# Patient Record
Sex: MALE | Race: WHITE | NOT HISPANIC OR LATINO | Employment: OTHER | URBAN - METROPOLITAN AREA
[De-identification: names, ages, dates, MRNs, and addresses within clinical notes are randomized per-mention and may not be internally consistent; named-entity substitution may affect disease eponyms.]

---

## 2018-02-20 ENCOUNTER — TRANSCRIBE ORDERS (OUTPATIENT)
Dept: ADMINISTRATIVE | Facility: HOSPITAL | Age: 68
End: 2018-02-20

## 2018-02-20 DIAGNOSIS — Z87.891 PERSONAL HISTORY OF TOBACCO USE, PRESENTING HAZARDS TO HEALTH: Primary | ICD-10-CM

## 2018-02-23 ENCOUNTER — HOSPITAL ENCOUNTER (OUTPATIENT)
Dept: ULTRASOUND IMAGING | Facility: HOSPITAL | Age: 68
Discharge: HOME | End: 2018-02-23
Attending: INTERNAL MEDICINE | Admitting: INTERNAL MEDICINE

## 2018-02-23 DIAGNOSIS — Z87.891 PERSONAL HISTORY OF TOBACCO USE, PRESENTING HAZARDS TO HEALTH: ICD-10-CM

## 2018-02-23 PROCEDURE — 76706 US ABDL AORTA SCREEN AAA: CPT

## 2018-05-11 ENCOUNTER — LAB REQUISITION (OUTPATIENT)
Dept: LAB | Facility: HOSPITAL | Age: 68
End: 2018-05-11

## 2018-05-11 DIAGNOSIS — Z00.00 ROUTINE GENERAL MEDICAL EXAMINATION AT A HEALTH CARE FACILITY: ICD-10-CM

## 2018-05-11 LAB
ALBUMIN SERPL-MCNC: 4.2 G/DL (ref 3.2–4.8)
ALBUMIN/GLOB SERPL: 1.6 G/DL (ref 1.5–2.5)
ALP SERPL-CCNC: 43 U/L (ref 25–100)
ALT SERPL W P-5'-P-CCNC: 26 U/L (ref 7–40)
ANION GAP SERPL CALCULATED.3IONS-SCNC: 8 MMOL/L (ref 3–11)
AST SERPL-CCNC: 28 U/L (ref 0–33)
BASOPHILS # BLD AUTO: 0 10*3/MM3 (ref 0–0.2)
BASOPHILS NFR BLD AUTO: 0 % (ref 0–1)
BILIRUB SERPL-MCNC: 0.4 MG/DL (ref 0.3–1.2)
BUN BLD-MCNC: 28 MG/DL (ref 9–23)
BUN/CREAT SERPL: 28 (ref 7–25)
CALCIUM SPEC-SCNC: 8.3 MG/DL (ref 8.7–10.4)
CHLORIDE SERPL-SCNC: 104 MMOL/L (ref 99–109)
CO2 SERPL-SCNC: 26 MMOL/L (ref 20–31)
CREAT BLD-MCNC: 1 MG/DL (ref 0.6–1.3)
DEPRECATED RDW RBC AUTO: 46.5 FL (ref 37–54)
EOSINOPHIL # BLD AUTO: 0.02 10*3/MM3 (ref 0–0.3)
EOSINOPHIL NFR BLD AUTO: 0.1 % (ref 0–3)
ERYTHROCYTE [DISTWIDTH] IN BLOOD BY AUTOMATED COUNT: 14.1 % (ref 11.3–14.5)
GFR SERPL CREATININE-BSD FRML MDRD: 74 ML/MIN/1.73
GLOBULIN UR ELPH-MCNC: 2.7 GM/DL
GLUCOSE BLD-MCNC: 130 MG/DL (ref 70–100)
HCT VFR BLD AUTO: 46.1 % (ref 38.9–50.9)
HGB BLD-MCNC: 14.9 G/DL (ref 13.1–17.5)
IMM GRANULOCYTES # BLD: 0.04 10*3/MM3 (ref 0–0.03)
IMM GRANULOCYTES NFR BLD: 0.3 % (ref 0–0.6)
LYMPHOCYTES # BLD AUTO: 0.99 10*3/MM3 (ref 0.6–4.8)
LYMPHOCYTES NFR BLD AUTO: 6.6 % (ref 24–44)
MCH RBC QN AUTO: 29.1 PG (ref 27–31)
MCHC RBC AUTO-ENTMCNC: 32.3 G/DL (ref 32–36)
MCV RBC AUTO: 90 FL (ref 80–99)
MONOCYTES # BLD AUTO: 0.35 10*3/MM3 (ref 0–1)
MONOCYTES NFR BLD AUTO: 2.3 % (ref 0–12)
NEUTROPHILS # BLD AUTO: 13.59 10*3/MM3 (ref 1.5–8.3)
NEUTROPHILS NFR BLD AUTO: 90.7 % (ref 41–71)
PLATELET # BLD AUTO: 215 10*3/MM3 (ref 150–450)
PMV BLD AUTO: 10.2 FL (ref 6–12)
POTASSIUM BLD-SCNC: 3.9 MMOL/L (ref 3.5–5.5)
PROT SERPL-MCNC: 6.9 G/DL (ref 5.7–8.2)
RBC # BLD AUTO: 5.12 10*6/MM3 (ref 4.2–5.76)
SODIUM BLD-SCNC: 138 MMOL/L (ref 132–146)
WBC NRBC COR # BLD: 14.99 10*3/MM3 (ref 3.5–10.8)

## 2018-05-11 PROCEDURE — 85025 COMPLETE CBC W/AUTO DIFF WBC: CPT | Performed by: INTERNAL MEDICINE

## 2018-05-11 PROCEDURE — 80053 COMPREHEN METABOLIC PANEL: CPT | Performed by: INTERNAL MEDICINE

## 2018-05-12 ENCOUNTER — APPOINTMENT (OUTPATIENT)
Dept: CT IMAGING | Facility: HOSPITAL | Age: 68
End: 2018-05-12

## 2018-05-12 ENCOUNTER — APPOINTMENT (OUTPATIENT)
Dept: GENERAL RADIOLOGY | Facility: HOSPITAL | Age: 68
End: 2018-05-12

## 2018-05-12 ENCOUNTER — HOSPITAL ENCOUNTER (OUTPATIENT)
Facility: HOSPITAL | Age: 68
Setting detail: OBSERVATION
Discharge: HOME | End: 2018-05-13
Attending: EMERGENCY MEDICINE | Admitting: INTERNAL MEDICINE

## 2018-05-12 DIAGNOSIS — R50.9 FEVER IN ADULT: Primary | ICD-10-CM

## 2018-05-12 DIAGNOSIS — E86.0 DEHYDRATION: ICD-10-CM

## 2018-05-12 DIAGNOSIS — R19.7 NAUSEA VOMITING AND DIARRHEA: ICD-10-CM

## 2018-05-12 DIAGNOSIS — R11.2 NAUSEA VOMITING AND DIARRHEA: ICD-10-CM

## 2018-05-12 PROBLEM — E11.9 TYPE 2 DIABETES MELLITUS (HCC): Status: ACTIVE | Noted: 2018-05-12

## 2018-05-12 PROBLEM — I10 ESSENTIAL HYPERTENSION: Status: ACTIVE | Noted: 2018-05-12

## 2018-05-12 PROBLEM — E87.6 HYPOKALEMIA: Status: ACTIVE | Noted: 2018-05-12

## 2018-05-12 PROBLEM — R65.10 SIRS (SYSTEMIC INFLAMMATORY RESPONSE SYNDROME) (HCC): Status: ACTIVE | Noted: 2018-05-12

## 2018-05-12 LAB
ALBUMIN SERPL-MCNC: 3.8 G/DL (ref 3.2–4.8)
ALBUMIN/GLOB SERPL: 1.4 G/DL (ref 1.5–2.5)
ALP SERPL-CCNC: 34 U/L (ref 25–100)
ALT SERPL W P-5'-P-CCNC: 32 U/L (ref 7–40)
ANION GAP SERPL CALCULATED.3IONS-SCNC: 8 MMOL/L (ref 3–11)
AST SERPL-CCNC: 41 U/L (ref 0–33)
BASOPHILS # BLD AUTO: 0.01 10*3/MM3 (ref 0–0.2)
BASOPHILS NFR BLD AUTO: 0.1 % (ref 0–1)
BILIRUB SERPL-MCNC: 0.5 MG/DL (ref 0.3–1.2)
BILIRUB UR QL STRIP: NEGATIVE
BUN BLD-MCNC: 26 MG/DL (ref 9–23)
BUN/CREAT SERPL: 26 (ref 7–25)
CALCIUM SPEC-SCNC: 8 MG/DL (ref 8.7–10.4)
CHLORIDE SERPL-SCNC: 104 MMOL/L (ref 99–109)
CLARITY UR: CLEAR
CO2 SERPL-SCNC: 22 MMOL/L (ref 20–31)
COLOR UR: YELLOW
CREAT BLD-MCNC: 1 MG/DL (ref 0.6–1.3)
D-LACTATE SERPL-SCNC: 0.7 MMOL/L (ref 0.5–2)
DEPRECATED RDW RBC AUTO: 47 FL (ref 37–54)
EOSINOPHIL # BLD AUTO: 0.02 10*3/MM3 (ref 0–0.3)
EOSINOPHIL NFR BLD AUTO: 0.2 % (ref 0–3)
ERYTHROCYTE [DISTWIDTH] IN BLOOD BY AUTOMATED COUNT: 14.3 % (ref 11.3–14.5)
FLUAV AG NPH QL: NEGATIVE
FLUBV AG NPH QL IA: NEGATIVE
GFR SERPL CREATININE-BSD FRML MDRD: 74 ML/MIN/1.73
GLOBULIN UR ELPH-MCNC: 2.7 GM/DL
GLUCOSE BLD-MCNC: 138 MG/DL (ref 70–100)
GLUCOSE UR STRIP-MCNC: ABNORMAL MG/DL
HCT VFR BLD AUTO: 42.5 % (ref 38.9–50.9)
HGB BLD-MCNC: 13.8 G/DL (ref 13.1–17.5)
HGB UR QL STRIP.AUTO: NEGATIVE
HOLD SPECIMEN: NORMAL
HOLD SPECIMEN: NORMAL
IMM GRANULOCYTES # BLD: 0.02 10*3/MM3 (ref 0–0.03)
IMM GRANULOCYTES NFR BLD: 0.2 % (ref 0–0.6)
KETONES UR QL STRIP: ABNORMAL
LEUKOCYTE ESTERASE UR QL STRIP.AUTO: NEGATIVE
LIPASE SERPL-CCNC: 26 U/L (ref 6–51)
LYMPHOCYTES # BLD AUTO: 0.51 10*3/MM3 (ref 0.6–4.8)
LYMPHOCYTES NFR BLD AUTO: 5 % (ref 24–44)
MCH RBC QN AUTO: 29 PG (ref 27–31)
MCHC RBC AUTO-ENTMCNC: 32.5 G/DL (ref 32–36)
MCV RBC AUTO: 89.3 FL (ref 80–99)
MONOCYTES # BLD AUTO: 0.15 10*3/MM3 (ref 0–1)
MONOCYTES NFR BLD AUTO: 1.5 % (ref 0–12)
NEUTROPHILS # BLD AUTO: 9.53 10*3/MM3 (ref 1.5–8.3)
NEUTROPHILS NFR BLD AUTO: 93.2 % (ref 41–71)
NITRITE UR QL STRIP: NEGATIVE
PH UR STRIP.AUTO: <=5 [PH] (ref 5–8)
PLATELET # BLD AUTO: 186 10*3/MM3 (ref 150–450)
PMV BLD AUTO: 10 FL (ref 6–12)
POTASSIUM BLD-SCNC: 3.4 MMOL/L (ref 3.5–5.5)
PROCALCITONIN SERPL-MCNC: 0.88 NG/ML
PROT SERPL-MCNC: 6.5 G/DL (ref 5.7–8.2)
PROT UR QL STRIP: ABNORMAL
RBC # BLD AUTO: 4.76 10*6/MM3 (ref 4.2–5.76)
SODIUM BLD-SCNC: 134 MMOL/L (ref 132–146)
SP GR UR STRIP: 1.04 (ref 1–1.03)
TROPONIN I SERPL-MCNC: 0.03 NG/ML (ref 0–0.07)
UROBILINOGEN UR QL STRIP: ABNORMAL
WBC NRBC COR # BLD: 10.22 10*3/MM3 (ref 3.5–10.8)
WHOLE BLOOD HOLD SPECIMEN: NORMAL
WHOLE BLOOD HOLD SPECIMEN: NORMAL

## 2018-05-12 PROCEDURE — 85025 COMPLETE CBC W/AUTO DIFF WBC: CPT | Performed by: EMERGENCY MEDICINE

## 2018-05-12 PROCEDURE — 93005 ELECTROCARDIOGRAM TRACING: CPT | Performed by: EMERGENCY MEDICINE

## 2018-05-12 PROCEDURE — 83690 ASSAY OF LIPASE: CPT | Performed by: EMERGENCY MEDICINE

## 2018-05-12 PROCEDURE — 71045 X-RAY EXAM CHEST 1 VIEW: CPT

## 2018-05-12 PROCEDURE — 25010000002 PIPERACILLIN SOD-TAZOBACTAM PER 1 G: Performed by: NURSE PRACTITIONER

## 2018-05-12 PROCEDURE — G0378 HOSPITAL OBSERVATION PER HR: HCPCS

## 2018-05-12 PROCEDURE — 81003 URINALYSIS AUTO W/O SCOPE: CPT | Performed by: EMERGENCY MEDICINE

## 2018-05-12 PROCEDURE — 84145 PROCALCITONIN (PCT): CPT | Performed by: NURSE PRACTITIONER

## 2018-05-12 PROCEDURE — 80053 COMPREHEN METABOLIC PANEL: CPT | Performed by: EMERGENCY MEDICINE

## 2018-05-12 PROCEDURE — 99220 PR INITIAL OBSERVATION CARE/DAY 70 MINUTES: CPT | Performed by: INTERNAL MEDICINE

## 2018-05-12 PROCEDURE — 96367 TX/PROPH/DG ADDL SEQ IV INF: CPT

## 2018-05-12 PROCEDURE — 87040 BLOOD CULTURE FOR BACTERIA: CPT | Performed by: NURSE PRACTITIONER

## 2018-05-12 PROCEDURE — 83605 ASSAY OF LACTIC ACID: CPT | Performed by: NURSE PRACTITIONER

## 2018-05-12 PROCEDURE — 74177 CT ABD & PELVIS W/CONTRAST: CPT

## 2018-05-12 PROCEDURE — 96366 THER/PROPH/DIAG IV INF ADDON: CPT

## 2018-05-12 PROCEDURE — 96365 THER/PROPH/DIAG IV INF INIT: CPT

## 2018-05-12 PROCEDURE — 25010000002 IOPAMIDOL 61 % SOLUTION: Performed by: EMERGENCY MEDICINE

## 2018-05-12 PROCEDURE — 99285 EMERGENCY DEPT VISIT HI MDM: CPT

## 2018-05-12 PROCEDURE — 87804 INFLUENZA ASSAY W/OPTIC: CPT | Performed by: NURSE PRACTITIONER

## 2018-05-12 PROCEDURE — 84484 ASSAY OF TROPONIN QUANT: CPT

## 2018-05-12 RX ORDER — ONDANSETRON 4 MG/1
4 TABLET, FILM COATED ORAL EVERY 6 HOURS PRN
COMMUNITY
End: 2019-02-20

## 2018-05-12 RX ORDER — ACETAMINOPHEN 325 MG/1
650 TABLET ORAL ONCE
Status: COMPLETED | OUTPATIENT
Start: 2018-05-12 | End: 2018-05-12

## 2018-05-12 RX ORDER — AMLODIPINE BESYLATE 10 MG/1
10 TABLET ORAL DAILY
COMMUNITY
End: 2019-02-20 | Stop reason: SDUPTHER

## 2018-05-12 RX ORDER — SODIUM CHLORIDE 0.9 % (FLUSH) 0.9 %
10 SYRINGE (ML) INJECTION AS NEEDED
Status: DISCONTINUED | OUTPATIENT
Start: 2018-05-12 | End: 2018-05-13 | Stop reason: HOSPADM

## 2018-05-12 RX ORDER — ROSUVASTATIN CALCIUM 20 MG/1
20 TABLET, COATED ORAL NIGHTLY
COMMUNITY
End: 2019-02-20 | Stop reason: SDUPTHER

## 2018-05-12 RX ORDER — ESCITALOPRAM OXALATE 10 MG/1
10 TABLET ORAL DAILY
COMMUNITY
End: 2019-02-20 | Stop reason: SDUPTHER

## 2018-05-12 RX ORDER — LISINOPRIL 20 MG/1
20 TABLET ORAL DAILY
COMMUNITY
End: 2019-04-08 | Stop reason: SDUPTHER

## 2018-05-12 RX ORDER — IBUPROFEN 800 MG/1
800 TABLET ORAL ONCE
Status: COMPLETED | OUTPATIENT
Start: 2018-05-12 | End: 2018-05-12

## 2018-05-12 RX ADMIN — ACETAMINOPHEN 650 MG: 325 TABLET, FILM COATED ORAL at 19:19

## 2018-05-12 RX ADMIN — IBUPROFEN 800 MG: 800 TABLET ORAL at 21:19

## 2018-05-12 RX ADMIN — SODIUM CHLORIDE 1000 ML: 9 INJECTION, SOLUTION INTRAVENOUS at 19:19

## 2018-05-12 RX ADMIN — SODIUM CHLORIDE 1000 ML: 9 INJECTION, SOLUTION INTRAVENOUS at 20:38

## 2018-05-12 RX ADMIN — IOPAMIDOL 85 ML: 612 INJECTION, SOLUTION INTRAVENOUS at 20:01

## 2018-05-12 RX ADMIN — TAZOBACTAM SODIUM AND PIPERACILLIN SODIUM 4.5 G: 500; 4 INJECTION, SOLUTION INTRAVENOUS at 23:14

## 2018-05-12 NOTE — ED PROVIDER NOTES
Subjective   NVD x 3 days.     Chills, fever, right and left low back pain.    No spinal pain. No ha. No neck pain. No cough or chest pain.        Illness   Severity:  Moderate  Onset quality:  Gradual  Duration:  3 hours  Timing:  Constant  Progression:  Unchanged  Chronicity:  New  Relieved by:  Nothing  Worsened by:  Nothing  Ineffective treatments:  None  Associated symptoms: diarrhea, fatigue, fever, myalgias, nausea and vomiting    Associated symptoms: no abdominal pain, no chest pain, no headaches, no loss of consciousness, no rash and no shortness of breath        Review of Systems   Constitutional: Positive for fatigue and fever. Negative for chills.   Respiratory: Negative for shortness of breath.    Cardiovascular: Negative for chest pain.   Gastrointestinal: Positive for diarrhea, nausea and vomiting. Negative for abdominal pain, anal bleeding, blood in stool and constipation.   Genitourinary: Negative for difficulty urinating, dysuria, flank pain, frequency, hematuria and urgency.   Musculoskeletal: Positive for myalgias.   Skin: Negative for rash.   Neurological: Negative for loss of consciousness and headaches.   All other systems reviewed and are negative.      Past Medical History:   Diagnosis Date   • Diabetes mellitus    • Hypertension        No Known Allergies    Past Surgical History:   Procedure Laterality Date   • KNEE SURGERY         History reviewed. No pertinent family history.    Social History     Social History   • Marital status:      Social History Main Topics   • Smoking status: Never Smoker   • Alcohol use Yes      Comment: VERY OCCASIONAL   • Drug use: No     Other Topics Concern   • Not on file           Objective   Physical Exam   Constitutional: He is oriented to person, place, and time. He appears well-developed and well-nourished.   HENT:   Head: Normocephalic and atraumatic.   Right Ear: External ear normal.   Left Ear: External ear normal.   Nose: Nose normal.    Mouth/Throat: Oropharynx is clear and moist.   Eyes: Conjunctivae and EOM are normal. Pupils are equal, round, and reactive to light.   Neck: Normal range of motion. Neck supple. No spinous process tenderness present. No no neck rigidity. No erythema and normal range of motion present. No Brudzinski's sign and no Kernig's sign noted.   Cardiovascular: Normal rate, regular rhythm, normal heart sounds and intact distal pulses.    Pulmonary/Chest: Effort normal and breath sounds normal.   Abdominal: Soft. Bowel sounds are normal.   Musculoskeletal: Normal range of motion.   Neurological: He is alert and oriented to person, place, and time.   Skin: Skin is warm and dry.   Psychiatric: He has a normal mood and affect. His behavior is normal. Judgment normal.       Procedures           ED Course  ED Course   Comment By Time   Pt appears non toxic in person. He is able to check his phone and walk to the BR without trouble.    There is no ha, neck pain, etc.    I spoke with Dr. Whipple. There is no clear benefit to admission at this point. BC ordered. Will give some motrin. If no vomiting in the ED and feeling better may be able to send home. Zeferino Herberth, APRN 05/12 2143   Pt still with fever of 103 despite motrin 800mg and tylenol 650mg. His procal is 0.88 which puts him at moderate risk for sepsis. Will order abx and admit.    I spoke with Dr. Whipple who will admit. Zeferino Kevin, APRN 05/12 2242                  [facility]      Final diagnoses:   Fever in adult   Nausea vomiting and diarrhea   Dehydration            Zeferino Kevin, NARCISO  05/12/18 2246

## 2018-05-13 VITALS
TEMPERATURE: 99.4 F | SYSTOLIC BLOOD PRESSURE: 103 MMHG | OXYGEN SATURATION: 95 % | BODY MASS INDEX: 30.51 KG/M2 | HEART RATE: 73 BPM | RESPIRATION RATE: 16 BRPM | DIASTOLIC BLOOD PRESSURE: 61 MMHG | WEIGHT: 206 LBS | HEIGHT: 69 IN

## 2018-05-13 PROBLEM — R65.10 SIRS (SYSTEMIC INFLAMMATORY RESPONSE SYNDROME): Status: RESOLVED | Noted: 2018-05-12 | Resolved: 2018-05-13

## 2018-05-13 PROBLEM — E87.6 HYPOKALEMIA: Status: RESOLVED | Noted: 2018-05-12 | Resolved: 2018-05-13

## 2018-05-13 LAB
ANION GAP SERPL CALCULATED.3IONS-SCNC: 3 MMOL/L (ref 3–11)
BASOPHILS # BLD AUTO: 0.01 10*3/MM3 (ref 0–0.2)
BASOPHILS NFR BLD AUTO: 0.1 % (ref 0–1)
BUN BLD-MCNC: 20 MG/DL (ref 9–23)
BUN/CREAT SERPL: 20 (ref 7–25)
C DIFF TOX GENS STL QL NAA+PROBE: NOT DETECTED
CALCIUM SPEC-SCNC: 6.9 MG/DL (ref 8.7–10.4)
CHLORIDE SERPL-SCNC: 110 MMOL/L (ref 99–109)
CO2 SERPL-SCNC: 27 MMOL/L (ref 20–31)
CREAT BLD-MCNC: 1 MG/DL (ref 0.6–1.3)
DEPRECATED RDW RBC AUTO: 48.5 FL (ref 37–54)
EOSINOPHIL # BLD AUTO: 0.06 10*3/MM3 (ref 0–0.3)
EOSINOPHIL NFR BLD AUTO: 0.7 % (ref 0–3)
ERYTHROCYTE [DISTWIDTH] IN BLOOD BY AUTOMATED COUNT: 14.7 % (ref 11.3–14.5)
GFR SERPL CREATININE-BSD FRML MDRD: 74 ML/MIN/1.73
GLUCOSE BLD-MCNC: 126 MG/DL (ref 70–100)
GLUCOSE BLDC GLUCOMTR-MCNC: 117 MG/DL (ref 70–130)
HBA1C MFR BLD: 7.4 % (ref 4.8–5.6)
HCT VFR BLD AUTO: 38.8 % (ref 38.9–50.9)
HGB BLD-MCNC: 12.3 G/DL (ref 13.1–17.5)
IMM GRANULOCYTES # BLD: 0.01 10*3/MM3 (ref 0–0.03)
IMM GRANULOCYTES NFR BLD: 0.1 % (ref 0–0.6)
LYMPHOCYTES # BLD AUTO: 1.23 10*3/MM3 (ref 0.6–4.8)
LYMPHOCYTES NFR BLD AUTO: 13.6 % (ref 24–44)
MAGNESIUM SERPL-MCNC: 2 MG/DL (ref 1.3–2.7)
MCH RBC QN AUTO: 28.7 PG (ref 27–31)
MCHC RBC AUTO-ENTMCNC: 31.7 G/DL (ref 32–36)
MCV RBC AUTO: 90.7 FL (ref 80–99)
MONOCYTES # BLD AUTO: 0.75 10*3/MM3 (ref 0–1)
MONOCYTES NFR BLD AUTO: 8.3 % (ref 0–12)
NEUTROPHILS # BLD AUTO: 6.98 10*3/MM3 (ref 1.5–8.3)
NEUTROPHILS NFR BLD AUTO: 77.3 % (ref 41–71)
PLATELET # BLD AUTO: 151 10*3/MM3 (ref 150–450)
PMV BLD AUTO: 9.7 FL (ref 6–12)
POTASSIUM BLD-SCNC: 3.5 MMOL/L (ref 3.5–5.5)
RBC # BLD AUTO: 4.28 10*6/MM3 (ref 4.2–5.76)
SODIUM BLD-SCNC: 140 MMOL/L (ref 132–146)
WBC NRBC COR # BLD: 9.03 10*3/MM3 (ref 3.5–10.8)
WBC STL QL MICRO: NORMAL

## 2018-05-13 PROCEDURE — G0378 HOSPITAL OBSERVATION PER HR: HCPCS

## 2018-05-13 PROCEDURE — 96372 THER/PROPH/DIAG INJ SC/IM: CPT

## 2018-05-13 PROCEDURE — 96367 TX/PROPH/DG ADDL SEQ IV INF: CPT

## 2018-05-13 PROCEDURE — 96366 THER/PROPH/DIAG IV INF ADDON: CPT

## 2018-05-13 PROCEDURE — 87046 STOOL CULTR AEROBIC BACT EA: CPT | Performed by: NURSE PRACTITIONER

## 2018-05-13 PROCEDURE — 25010000002 VANCOMYCIN 10 G RECONSTITUTED SOLUTION: Performed by: NURSE PRACTITIONER

## 2018-05-13 PROCEDURE — 25010000002 ENOXAPARIN PER 10 MG: Performed by: NURSE PRACTITIONER

## 2018-05-13 PROCEDURE — 25010000002 PIPERACILLIN SOD-TAZOBACTAM PER 1 G: Performed by: NURSE PRACTITIONER

## 2018-05-13 PROCEDURE — 85025 COMPLETE CBC W/AUTO DIFF WBC: CPT | Performed by: NURSE PRACTITIONER

## 2018-05-13 PROCEDURE — 82962 GLUCOSE BLOOD TEST: CPT

## 2018-05-13 PROCEDURE — 87177 OVA AND PARASITES SMEARS: CPT | Performed by: NURSE PRACTITIONER

## 2018-05-13 PROCEDURE — 87045 FECES CULTURE AEROBIC BACT: CPT | Performed by: NURSE PRACTITIONER

## 2018-05-13 PROCEDURE — 83735 ASSAY OF MAGNESIUM: CPT | Performed by: NURSE PRACTITIONER

## 2018-05-13 PROCEDURE — 87493 C DIFF AMPLIFIED PROBE: CPT | Performed by: NURSE PRACTITIONER

## 2018-05-13 PROCEDURE — 99217 PR OBSERVATION CARE DISCHARGE MANAGEMENT: CPT | Performed by: INTERNAL MEDICINE

## 2018-05-13 PROCEDURE — 87205 SMEAR GRAM STAIN: CPT | Performed by: NURSE PRACTITIONER

## 2018-05-13 PROCEDURE — 80048 BASIC METABOLIC PNL TOTAL CA: CPT | Performed by: NURSE PRACTITIONER

## 2018-05-13 PROCEDURE — 96361 HYDRATE IV INFUSION ADD-ON: CPT

## 2018-05-13 PROCEDURE — 83036 HEMOGLOBIN GLYCOSYLATED A1C: CPT | Performed by: NURSE PRACTITIONER

## 2018-05-13 PROCEDURE — 87209 SMEAR COMPLEX STAIN: CPT | Performed by: NURSE PRACTITIONER

## 2018-05-13 RX ORDER — SODIUM CHLORIDE 9 MG/ML
100 INJECTION, SOLUTION INTRAVENOUS CONTINUOUS
Status: DISCONTINUED | OUTPATIENT
Start: 2018-05-13 | End: 2018-05-13 | Stop reason: HOSPADM

## 2018-05-13 RX ORDER — POTASSIUM CHLORIDE 7.45 MG/ML
10 INJECTION INTRAVENOUS
Status: DISCONTINUED | OUTPATIENT
Start: 2018-05-13 | End: 2018-05-13 | Stop reason: HOSPADM

## 2018-05-13 RX ORDER — ONDANSETRON 4 MG/1
4 TABLET, FILM COATED ORAL EVERY 6 HOURS PRN
Status: DISCONTINUED | OUTPATIENT
Start: 2018-05-13 | End: 2018-05-13 | Stop reason: HOSPADM

## 2018-05-13 RX ORDER — LISINOPRIL 20 MG/1
20 TABLET ORAL DAILY
Status: DISCONTINUED | OUTPATIENT
Start: 2018-05-13 | End: 2018-05-13

## 2018-05-13 RX ORDER — POTASSIUM CHLORIDE 1.5 G/1.77G
40 POWDER, FOR SOLUTION ORAL AS NEEDED
Status: DISCONTINUED | OUTPATIENT
Start: 2018-05-13 | End: 2018-05-13 | Stop reason: HOSPADM

## 2018-05-13 RX ORDER — POTASSIUM CHLORIDE 750 MG/1
40 CAPSULE, EXTENDED RELEASE ORAL AS NEEDED
Status: DISCONTINUED | OUTPATIENT
Start: 2018-05-13 | End: 2018-05-13 | Stop reason: HOSPADM

## 2018-05-13 RX ORDER — AMLODIPINE BESYLATE 10 MG/1
10 TABLET ORAL DAILY
Status: DISCONTINUED | OUTPATIENT
Start: 2018-05-13 | End: 2018-05-13

## 2018-05-13 RX ORDER — ESCITALOPRAM OXALATE 10 MG/1
10 TABLET ORAL DAILY
Status: DISCONTINUED | OUTPATIENT
Start: 2018-05-13 | End: 2018-05-13 | Stop reason: HOSPADM

## 2018-05-13 RX ORDER — ACETAMINOPHEN 325 MG/1
650 TABLET ORAL EVERY 4 HOURS PRN
Status: DISCONTINUED | OUTPATIENT
Start: 2018-05-13 | End: 2018-05-13 | Stop reason: HOSPADM

## 2018-05-13 RX ORDER — ROSUVASTATIN CALCIUM 20 MG/1
20 TABLET, COATED ORAL NIGHTLY
Status: DISCONTINUED | OUTPATIENT
Start: 2018-05-13 | End: 2018-05-13 | Stop reason: HOSPADM

## 2018-05-13 RX ORDER — ONDANSETRON 2 MG/ML
4 INJECTION INTRAMUSCULAR; INTRAVENOUS EVERY 6 HOURS PRN
Status: DISCONTINUED | OUTPATIENT
Start: 2018-05-13 | End: 2018-05-13 | Stop reason: HOSPADM

## 2018-05-13 RX ADMIN — POTASSIUM CHLORIDE 40 MEQ: 750 CAPSULE, EXTENDED RELEASE ORAL at 08:27

## 2018-05-13 RX ADMIN — ESCITALOPRAM OXALATE 10 MG: 10 TABLET ORAL at 08:27

## 2018-05-13 RX ADMIN — ENOXAPARIN SODIUM 30 MG: 30 INJECTION SUBCUTANEOUS at 08:27

## 2018-05-13 RX ADMIN — SODIUM CHLORIDE 100 ML/HR: 9 INJECTION, SOLUTION INTRAVENOUS at 02:06

## 2018-05-13 RX ADMIN — TAZOBACTAM SODIUM AND PIPERACILLIN SODIUM 3.38 G: 375; 3 INJECTION, SOLUTION INTRAVENOUS at 05:09

## 2018-05-13 RX ADMIN — ROSUVASTATIN CALCIUM 20 MG: 20 TABLET ORAL at 02:06

## 2018-05-13 RX ADMIN — VANCOMYCIN HYDROCHLORIDE 1750 MG: 10 INJECTION, POWDER, LYOPHILIZED, FOR SOLUTION INTRAVENOUS at 00:03

## 2018-05-13 NOTE — H&P
[facility] Medicine Services  HISTORY AND PHYSICAL    Patient Name: Kvng Li  : 1950  MRN: [id number]  Primary Care Physician: Zeferino Honeycutt MD    Subjective   Subjective     Chief Complaint:  Fever, nausea, vomiting, diarrhea    HPI:  Kvng Li is a 67 y.o. male with a past medical history significant for T2DM, hyperlipidemia and essential hypertension presents to the ED with complaints of nausea, vomiting, diarrhea and fever.  Patient states that on Thursday he had gone out to eat with his wife and about three hours after eating he had acute onset of nausea with vomiting.  In addition he has had intermittent chills and diarrhea.  Patient was seen by his PCP yesterday and was given prescription for zofran in which he states has helped some.  He was advised by his PCP if his N/V continued to go to the ED.  Patient states his N/V and diarrhea has continued along with lower back pain.   Upon arrival to the ED his temperature was 103.1.  He denies any chest pain, shortness of air, dysuria, hematuria, injury, erythema, cough or sore throat.  Currently he states his diarrhea has stopped and he no longer has any abdominal pain. He is eating crackers and drinking. Patient will be admitted to [facility] under the care of the Hospitalist for further evaluation and treatment.     Review of Systems   Constitutional: Positive for appetite change, chills and fever. Negative for fatigue.   HENT: Negative.    Eyes: Negative.    Respiratory: Negative for cough and shortness of breath.    Cardiovascular: Negative for chest pain, palpitations and leg swelling.   Gastrointestinal: Positive for abdominal pain, diarrhea, nausea and vomiting. Negative for abdominal distention.   Endocrine: Negative.    Genitourinary: Negative for difficulty urinating, dysuria, flank pain, frequency, hematuria and urgency.   Musculoskeletal: Positive for back pain. Negative for neck pain and neck stiffness.    Skin: Negative.    Neurological: Negative.    Psychiatric/Behavioral: Negative.         Otherwise 10-system ROS reviewed and is negative except as mentioned in the HPI.    Personal History     Past Medical History:   Diagnosis Date   • Diabetes mellitus    • Hypertension        Past Surgical History:   Procedure Laterality Date   • KNEE SURGERY         Family History: family history is not on file.     Social History:  reports that he has never smoked. He has never used smokeless tobacco. He reports that he drinks alcohol. He reports that he does not use drugs.    Medications:    (Not in a hospital admission)    No Known Allergies    Objective   Objective     Vital Signs:   Temp:  [101.3 °F (38.5 °C)-103.1 °F (39.5 °C)] 103 °F (39.4 °C)  Heart Rate:  [] 107  Resp:  [16-18] 16  BP: (115-133)/(57-64) 115/64        Physical Exam   Constitutional: He is oriented to person, place, and time. He appears well-developed and well-nourished. No distress.   Alert and oriented x3 pleasant cooperative.  Eating crackers and drinking.  Family at bedside.    HENT:   Head: Normocephalic and atraumatic.   Mouth/Throat: No oropharyngeal exudate.   Eyes: Conjunctivae and EOM are normal. Pupils are equal, round, and reactive to light. Right eye exhibits no discharge. Left eye exhibits no discharge. No scleral icterus.   Neck: Normal range of motion. Neck supple. No JVD present. No tracheal deviation present. No thyromegaly present.   Cardiovascular: Normal rate, regular rhythm, normal heart sounds and intact distal pulses.  Exam reveals no gallop and no friction rub.    No murmur heard.  Pulmonary/Chest: Effort normal and breath sounds normal. No stridor. No respiratory distress. He has no wheezes. He has no rales. He exhibits no tenderness.   Abdominal: Soft. Bowel sounds are normal. He exhibits no distension and no mass. There is no tenderness. There is no rebound and no guarding. No hernia.   Musculoskeletal: Normal range of  motion. He exhibits no edema, tenderness or deformity.   Lymphadenopathy:     He has no cervical adenopathy.   Neurological: He is alert and oriented to person, place, and time. He has normal reflexes.   Skin: Skin is warm and dry. No rash noted. He is not diaphoretic. No erythema. No pallor.   Psychiatric: He has a normal mood and affect. His behavior is normal. Judgment and thought content normal.   Nursing note and vitals reviewed.       Results Reviewed:  I have personally reviewed current lab, radiology, and data and agree.      Results from last 7 days  Lab Units 05/12/18  1820   WBC 10*3/mm3 10.22   HEMOGLOBIN g/dL 13.8   HEMATOCRIT % 42.5   PLATELETS 10*3/mm3 186       Results from last 7 days  Lab Units 05/12/18  1820   SODIUM mmol/L 134   POTASSIUM mmol/L 3.4*   CHLORIDE mmol/L 104   CO2 mmol/L 22.0   BUN mg/dL 26*   CREATININE mg/dL 1.00   GLUCOSE mg/dL 138*   CALCIUM mg/dL 8.0*   ALT (SGPT) U/L 32   AST (SGOT) U/L 41*     No results found for: BNP  No results found for: PHART  Imaging Results (last 24 hours)     Procedure Component Value Units Date/Time    CT Abdomen Pelvis With Contrast [[id number]] Collected:  05/12/18 1901     Updated:  05/12/18 2050    Narrative:       EXAM:  CT Abdomen and Pelvis With Intravenous Contrast    CLINICAL HISTORY:  Signs and symptoms; Nausea and vomiting; Additional info: Nvd. Fever.    TECHNIQUE:  Axial computed tomography images of the abdomen and pelvis with intravenous   contrast.  All CT scans at this facility use one or more dose reduction   techniques, viz.: automated exposure control; ma/kV adjustment per patient size   (including targeted exams where dose is matched to indication; i.e. head); or   iterative reconstruction technique.  Coronal reformatted images were created and reviewed.    CONTRAST:  85 mL of isovue 300 administered intravenously.    COMPARISON:  No relevant prior studies available.    FINDINGS:  Lung bases:  The lung bases are  clear.    ABDOMEN:  Liver:  Liver unremarkable.  Gallbladder and bile ducts:  Gallbladder is hydropic.  Pancreas:  Unremarkable.  Spleen:  Unremarkable.  Adrenals:  Unremarkable.  Kidneys and ureters:  No acute abnormalities.  There is no hydronephrosis.  Stomach and bowel:  Unremarkable.  No obstruction.    PELVIS:  Appendix:  The appendix does not appear acutely inflamed.  Bladder:  Unremarkable.  Reproductive:  Unremarkable as visualized.    ABDOMEN and PELVIS:  Intraperitoneal space:  No free fluid, free intraperitoneal air or abscess.  Bones/joints:  No acute fracture.  No dislocation.  Soft tissues:  Small fat containing right inguinal hernia.  Vasculature:  The aorta demonstrates moderate atherosclerotic calcification.    The aorta is of normal caliber without aneurysm or dissection.  Lymph nodes:  Unremarkable.  No enlarged lymph nodes.      Impression:       No acute abnormalities visualized. Small fat containing right inguinal hernia.    THIS DOCUMENT HAS BEEN ELECTRONICALLY SIGNED BY DAVID LOMAS MD    XR Chest 1 View [[id number]] Collected:  05/12/18 1812     Updated:  05/12/18 2035    Narrative:       EXAM:  XR Chest, 1 View    CLINICAL HISTORY:  Pain; Other: Upper abd pain; Additional info: Upper abdominal pain triage   protocol    TECHNIQUE:  Frontal view of the chest.    COMPARISON:  No relevant prior studies available.    FINDINGS:  Lungs:  There is no focal pulmonary consolidation.  Pleural space:  There are no pleural effusions present.  There is no evidence   of pneumothorax.  Heart:  The cardiac silhouette is within normal limits.  Mediastinum:  Unremarkable.  Bones/joints:  The visualized bones demonstrate no acute abnormalities.      Impression:       No acute cardiopulmonary disease.    THIS DOCUMENT HAS BEEN ELECTRONICALLY SIGNED BY DAVID LOMAS MD             Assessment/Plan   Assessment / Plan     Hospital Problem List     * (Principal)SIRS (systemic inflammatory response syndrome)     Hypokalemia    Type 2 diabetes mellitus    Essential hypertension            Assessment & Plan:  67 year old male presents to the ED with a three day history of nausea, vomiting, diarrhea and fever.     1) SIRS  -WBC 14.99, Temp 103.1, procal 0.88  -CXR negative, CT of abdomen and pelvis negative   -continue IVF, vanc and zosyn   -UA: 3+ glucose, 2+ ketones   -blood cultures and urine cultures pending   -repeat labs in am  -lactic acid 0.7  -? Viral  -+/- ID consult if no improvement    2) Hypokalemia  -replace per protocol  -check mag    3) Insulin dependent diabetes mellitus type 2  -check hgb a1c  -start ldssi  -fingersticks achs    4) Essential hypertension  -continue home medications       DVT prophylaxis:edita/scds     CODE STATUS:  Full code   Admission Status:  I believe this patient meets INPATIENT status due to the need for care which can only be reasonably provided in an hospital setting such as aggressive/expedited ancillary services and/or consultation services, the necessity for IV medications, close physician monitoring and/or the possible need for procedures.  In such, I feel patient’s risk for adverse outcomes and need for care warrant INPATIENT evaluation and predict the patient’s care encounter to likely last beyond 2 midnights.    Shereen Esqueda, NARCISO   05/12/18   11:19 PM      Brief Attending Admission Attestation     I have seen and examined the patient, performing an independent face-to-face diagnostic evaluation with plan of care reviewed and developed with Ms. Esqueda.      Brief Summary Statement/HPI:   Kvng Li is a 67 y.o. male with PMH of HTN, T2DM who presents with complaints of subjective fever and N/V/D since eating at a local Italian restaurant two days prior to admission. Pt reports that he had 15 Bms yesterday and that he has taken Imodium which has helped. He saw his PCP yesterday who thought he was just dehydrated but advised him to come to the ED over the weekend should  his symptoms worsen. Pt states that he did get worse and continued to have diarrhea as well as vomiting, so he came to the Ed. Pt found to have a fever of 103F and elevated procalcitonin so we were asked to admit.     Attending Physical Exam:  Constitutional: No acute distress, awake, alert  Eyes: PERRLA, sclerae anicteric, no conjunctival injection  HENT: NCAT, mucous membranes moist  Neck: Supple, no thyromegaly, no lymphadenopathy, trachea midline  Respiratory: Clear to auscultation bilaterally, nonlabored respirations   Cardiovascular: RRR, no murmurs, rubs, or gallops, palpable pedal pulses bilaterally  Gastrointestinal: Positive bowel sounds, soft, nontender, nondistended  Musculoskeletal: No bilateral ankle edema, no clubbing or cyanosis to extremities  Psychiatric: Appropriate affect, cooperative  Neurologic: Oriented x 3, strength symmetric in all extremities, Cranial Nerves grossly intact to confrontation, speech clear  Skin: No rashes    Brief Assessment/Plan :  Mr. Li is a 68 yo WM w/ PMH of HTN, T2DM who presents with fever and GI symptoms after eating at an [place] restaurant. He is being admitted for sepsis likely due to food borne related illness vs viral gastroenteritis.    Plan:  --continue ABX for now although I doubt bacterial etiology.  Given his history of eating salad and the recent E coli outbreak from affect batsheva lettuce, will check stool studies.  Pt has now defervesced.  If all looks good in the am, likely can d/c home off ABX.   See above for further detailed assessment and plan developed with APC which I have reviewed and/or edited.      Electronically signed by Oralia Whipple MD, 05/13/18, 1:36 AM.

## 2018-05-13 NOTE — PROGRESS NOTES
Pharmacokinetic Consult - Vancomycin Dosing  Kvng Li is a 67 y.o. male who has been consulted for vancomycin dosing for sepsis  (goal trough 15-20 mcg/mL).    Current Antimicrobial Therapy  Zosyn 3.375 gm IV q8h (extended infusion)  Vancomycin 1500 mg Iv q12h    Allergies  Review of patient's allergies indicates no known allergies.    Relevant clinical data and objective history reviewed:  Creatinine   Date Value Ref Range Status   05/12/2018 1.00 0.60 - 1.30 mg/dL Final     Estimated Creatinine Clearance: 80.5 mL/min (by C-G formula based on SCr of 1 mg/dL).  No intake/output data recorded.  Patient weight: 93.4 kg (206 lb)    Asessment/Plan  Will initiate dose at 1750 mg IV once (given in ED 5/13 at 0003)       followed by  Vancomycin 1500 mg IV q 12 hours    Vancomycin trough is scheduled 5/14 at 1130 prior to the 4th dose    Chalo Freeman [facility]  5/13/2018  12:28 AM

## 2018-05-13 NOTE — PLAN OF CARE
Problem: Diarrhea (Adult)  Goal: Identify Related Risk Factors and Signs and Symptoms  Outcome: Ongoing (interventions implemented as appropriate)   05/13/18 0147   Diarrhea (Adult)   Related Risk Factors (Diarrhea) infectious process   Signs and Symptoms (Diarrhea) decreased appetite;increased stool frequency, amount, and fluidity

## 2018-05-13 NOTE — PLAN OF CARE
Problem: Patient Care Overview  Goal: Plan of Care Review  Outcome: Ongoing (interventions implemented as appropriate)   05/13/18 0147   Coping/Psychosocial   Plan of Care Reviewed With patient   Plan of Care Review   Progress no change

## 2018-05-13 NOTE — PLAN OF CARE
Problem: Patient Care Overview  Goal: Plan of Care Review  Outcome: Ongoing (interventions implemented as appropriate)   05/13/18 1033   Coping/Psychosocial   Plan of Care Reviewed With patient   Plan of Care Review   Progress no change   OTHER   Outcome Summary Contined diarrhea, ate well this am, resting at this time     Goal: Individualization and Mutuality  Outcome: Ongoing (interventions implemented as appropriate)   05/13/18 1033   Individualization   Patient Specific Interventions Monitor pain q2h and prn       Problem: Diarrhea (Adult)  Goal: Identify Related Risk Factors and Signs and Symptoms  Outcome: Ongoing (interventions implemented as appropriate)   05/13/18 0147   Diarrhea (Adult)   Related Risk Factors (Diarrhea) infectious process   Signs and Symptoms (Diarrhea) decreased appetite;increased stool frequency, amount, and fluidity

## 2018-05-13 NOTE — DISCHARGE SUMMARY
[facility] Medicine Services  DISCHARGE SUMMARY    Patient Name: Kvng Li  : 1950  MRN: [id number]    Date of Admission: 2018  Date of Discharge:  2018  Primary Care Physician: Zeferino Honeycutt MD    Consults     No orders found for last 30 day(s).        Hospital Course     Presenting Problem:   SIRS (systemic inflammatory response syndrome) [R65.10]    Active Hospital Problems (** Indicates Principal Problem)    Diagnosis Date Noted   • Type 2 diabetes mellitus [E11.9] 2018   • Essential hypertension [I10] 2018      Resolved Hospital Problems    Diagnosis Date Noted Date Resolved   • **SIRS (systemic inflammatory response syndrome) [R65.10] 2018   • Hypokalemia [E87.6] 2018        Hospital Course:  Kvng Li is a 67 y.o. male PMH of HTN, T2DM who presents with complaints of subjective fever and N/V/D since eating at a local Italian restaurant two days prior to admission. Pt reports that he had 15 Bms yesterday and that he has taken Imodium which has helped. He saw his PCP yesterday who thought he was just dehydrated but advised him to come to the ED over the weekend should his symptoms worsen. Pt states that he did get worse and continued to have diarrhea as well as vomiting, so he came to the Ed. Pt found to have a fever of 103F and elevated procalcitonin.The etiology of his symptoms were likely viral gastroenteritis, he was started on IVF and abx, he was hypokalemic, K+ was replaced, we will d/c abx at discharge, stool studies and blood cultures were still pending,  patient was feeling better and as such we will d/c him home to f/u with PCP in 1-2 weeks.       Day of Discharge     HPI: No acute events overnight,patient states that he is feeling better, denies any n/v, does endorse some loose stool    Review of Systems  Gen- No fevers, chills  CV- No chest pain, palpitations  Resp- No cough, dyspnea  GI- No  N/V/D, abd pain  Otherwise ROS is negative except as mentioned in the HPI.    Vital Signs:   Temp:  [97.3 °F (36.3 °C)-103.1 °F (39.5 °C)] 99.4 °F (37.4 °C)  Heart Rate:  [] 73  Resp:  [16-18] 16  BP: ()/(26-64) 103/61     Physical Exam:  Constitutional: No acute distress, awake, alert  HENT: NCAT, mucous membranes moist  Respiratory: Clear to auscultation bilaterally, respiratory effort normal   Cardiovascular: RRR, no murmurs, rubs, or gallops, palpable pedal pulses bilaterally  Gastrointestinal: Positive bowel sounds, soft, nontender, nondistended  Musculoskeletal: No bilateral ankle edema  Psychiatric: Appropriate affect, cooperative  Neurologic: Oriented x 3, strength symmetric in all extremities, Cranial Nerves grossly intact to confrontation, speech clear  Skin: No rashes    Pertinent  and/or Most Recent Results       Results from last 7 days  Lab Units 05/13/18  0530 05/12/18  1820 05/11/18  1705   WBC 10*3/mm3 9.03 10.22 14.99*   HEMOGLOBIN g/dL 12.3* 13.8 14.9   HEMATOCRIT % 38.8* 42.5 46.1   PLATELETS 10*3/mm3 151 186 215   SODIUM mmol/L 140 134 138   POTASSIUM mmol/L 3.5 3.4* 3.9   CHLORIDE mmol/L 110* 104 104   CO2 mmol/L 27.0 22.0 26.0   BUN mg/dL 20 26* 28*   CREATININE mg/dL 1.00 1.00 1.00   GLUCOSE mg/dL 126* 138* 130*   CALCIUM mg/dL 6.9* 8.0* 8.3*       Results from last 7 days  Lab Units 05/12/18  1820 05/11/18  1705   BILIRUBIN mg/dL 0.5 0.4   ALK PHOS U/L 34 43   ALT (SGPT) U/L 32 26   AST (SGOT) U/L 41* 28           Invalid input(s): TG, LDLCALC, LDLREALC    Results from last 7 days  Lab Units 05/13/18  0530   HEMOGLOBIN A1C % 7.40*     Brief Urine Lab Results  (Last result in the past 365 days)      Color   Clarity   Blood   Leuk Est   Nitrite   Protein   CREAT   Urine HCG        05/12/18 1921 Yellow Clear Negative Negative Negative Trace(A)               Microbiology Results Abnormal     Procedure Component Value - Date/Time    Blood Culture - Blood, [[id number]]  (Normal)  Collected:  05/12/18 2125    Lab Status:  Preliminary result Specimen:  Blood from Arm, Right Updated:  05/13/18 0945     Blood Culture No growth at less than 24 hours    Blood Culture - Blood, [[id number]]  (Normal) Collected:  05/12/18 2120    Lab Status:  Preliminary result Specimen:  Blood from Hand, Right Updated:  05/13/18 0945     Blood Culture No growth at less than 24 hours    Clostridium Difficile Toxin - Stool, Per Rectum [[id number]] Collected:  05/13/18 0215    Lab Status:  Final result Specimen:  Stool from Per Rectum Updated:  05/13/18 0918    Narrative:       The following orders were created for panel order Clostridium Difficile Toxin - Stool, Per Rectum.  Procedure                               Abnormality         Status                     ---------                               -----------         ------                     Clostridium Difficile To...[[id number]]  Normal              Final result                 Please view results for these tests on the individual orders.    Clostridium Difficile Toxin, PCR - Stool, Per Rectum [[id number]]  (Normal) Collected:  05/13/18 0215    Lab Status:  Final result Specimen:  Stool from Per Rectum Updated:  05/13/18 0918     C. Difficile Toxins by PCR Not Detected    Narrative:         Performance characteristics of test not established for patients <2 years of age.  Negative for Toxigenic C. Difficile    Fecal Leukocytes - Stool, Per Rectum [[id number]]  (Normal) Collected:  05/13/18 0215    Lab Status:  Final result Specimen:  Stool from Per Rectum Updated:  05/13/18 0900     Fecal Leukocytes No WBC's Seen    Influenza Antigen, Rapid - Swab, Nasopharynx [[id number]]  (Normal) Collected:  05/12/18 2154    Lab Status:  Final result Specimen:  Swab from Nasopharynx Updated:  05/12/18 2211     Influenza A Ag, EIA Negative     Influenza B Ag, EIA Negative          Imaging Results (all)     Procedure Component Value Units Date/Time    CT Abdomen Pelvis With Contrast  [[id number]] Collected:  05/12/18 1901     Updated:  05/12/18 2050    Narrative:       EXAM:  CT Abdomen and Pelvis With Intravenous Contrast    CLINICAL HISTORY:  Signs and symptoms; Nausea and vomiting; Additional info: Nvd. Fever.    TECHNIQUE:  Axial computed tomography images of the abdomen and pelvis with intravenous   contrast.  All CT scans at this facility use one or more dose reduction   techniques, viz.: automated exposure control; ma/kV adjustment per patient size   (including targeted exams where dose is matched to indication; i.e. head); or   iterative reconstruction technique.  Coronal reformatted images were created and reviewed.    CONTRAST:  85 mL of isovue 300 administered intravenously.    COMPARISON:  No relevant prior studies available.    FINDINGS:  Lung bases:  The lung bases are clear.    ABDOMEN:  Liver:  Liver unremarkable.  Gallbladder and bile ducts:  Gallbladder is hydropic.  Pancreas:  Unremarkable.  Spleen:  Unremarkable.  Adrenals:  Unremarkable.  Kidneys and ureters:  No acute abnormalities.  There is no hydronephrosis.  Stomach and bowel:  Unremarkable.  No obstruction.    PELVIS:  Appendix:  The appendix does not appear acutely inflamed.  Bladder:  Unremarkable.  Reproductive:  Unremarkable as visualized.    ABDOMEN and PELVIS:  Intraperitoneal space:  No free fluid, free intraperitoneal air or abscess.  Bones/joints:  No acute fracture.  No dislocation.  Soft tissues:  Small fat containing right inguinal hernia.  Vasculature:  The aorta demonstrates moderate atherosclerotic calcification.    The aorta is of normal caliber without aneurysm or dissection.  Lymph nodes:  Unremarkable.  No enlarged lymph nodes.      Impression:       No acute abnormalities visualized. Small fat containing right inguinal hernia.    THIS DOCUMENT HAS BEEN ELECTRONICALLY SIGNED BY DAVID LOMAS MD    XR Chest 1 View [[id number]] Collected:  05/12/18 1812     Updated:  05/12/18 2035    Narrative:        EXAM:  XR Chest, 1 View    CLINICAL HISTORY:  Pain; Other: Upper abd pain; Additional info: Upper abdominal pain triage   protocol    TECHNIQUE:  Frontal view of the chest.    COMPARISON:  No relevant prior studies available.    FINDINGS:  Lungs:  There is no focal pulmonary consolidation.  Pleural space:  There are no pleural effusions present.  There is no evidence   of pneumothorax.  Heart:  The cardiac silhouette is within normal limits.  Mediastinum:  Unremarkable.  Bones/joints:  The visualized bones demonstrate no acute abnormalities.      Impression:       No acute cardiopulmonary disease.    THIS DOCUMENT HAS BEEN ELECTRONICALLY SIGNED BY DAVID LOMAS MD         Order Current Status    Ova & Parasite Examination - Stool, Per Rectum In process    Stool Culture - Stool, Per Rectum In process    Blood Culture - Blood, Preliminary result    Blood Culture - Blood, Preliminary result        Discharge Details      Kvng Li   Home Medication Instructions CAROLYNN:[id number]    Printed on:05/13/18 3595   Medication Information                      amLODIPine (NORVASC) 10 MG tablet  Take 10 mg by mouth Daily.             Empagliflozin (JARDIANCE) 25 MG tablet  Take  by mouth Daily.             escitalopram (LEXAPRO) 10 MG tablet  Take 10 mg by mouth Daily.             Insulin Glargine (TOUJEO SOLOSTAR) 300 UNIT/ML solution pen-injector  Inject 25 Units under the skin Every Night.             lisinopril (PRINIVIL,ZESTRIL) 20 MG tablet  Take 20 mg by mouth Daily.             metFORMIN (GLUCOPHAGE) 500 MG tablet  Take 2,000 mg by mouth Daily.             ondansetron (ZOFRAN) 4 MG tablet  Take 4 mg by mouth Every 6 (Six) Hours As Needed for Nausea or Vomiting.             rosuvastatin (CRESTOR) 20 MG tablet  Take 20 mg by mouth Every Night.                 Discharge Disposition:  Home or Self Care    Discharge Diet:  Diet Instructions     Advance Diet As Tolerated             Discharge Activity:   Activity  Instructions     Activity as Tolerated             Special Instructions:None      No future appointments.    Additional Instructions for the Follow-ups that You Need to Schedule     Discharge Follow-up with PCP    As directed      Follow Up Details:  1-2 weeks             Time Spent on Discharge:  35 minutes    Electronically signed by Caesar Ingram MD, 05/13/18, 11:01 AM.

## 2018-05-15 LAB — BACTERIA SPEC AEROBE CULT: NORMAL

## 2018-05-16 LAB
O+P SPEC MICRO: NORMAL
O+P STL TRI STN: NORMAL

## 2018-05-17 LAB
BACTERIA SPEC AEROBE CULT: NORMAL
BACTERIA SPEC AEROBE CULT: NORMAL

## 2018-08-07 ENCOUNTER — OFFICE VISIT (OUTPATIENT)
Dept: FAMILY MEDICINE CLINIC | Facility: CLINIC | Age: 68
End: 2018-08-07

## 2018-08-07 VITALS
RESPIRATION RATE: 16 BRPM | TEMPERATURE: 98.2 F | OXYGEN SATURATION: 96 % | HEIGHT: 69 IN | HEART RATE: 53 BPM | BODY MASS INDEX: 31.1 KG/M2 | WEIGHT: 210 LBS | SYSTOLIC BLOOD PRESSURE: 124 MMHG | DIASTOLIC BLOOD PRESSURE: 82 MMHG

## 2018-08-07 DIAGNOSIS — R79.9 ABNORMAL FINDING OF BLOOD CHEMISTRY: ICD-10-CM

## 2018-08-07 DIAGNOSIS — E78.01 FAMILIAL HYPERCHOLESTEROLEMIA: ICD-10-CM

## 2018-08-07 DIAGNOSIS — F41.1 GAD (GENERALIZED ANXIETY DISORDER): ICD-10-CM

## 2018-08-07 DIAGNOSIS — Z23 IMMUNIZATION DUE: ICD-10-CM

## 2018-08-07 DIAGNOSIS — I10 ESSENTIAL HYPERTENSION: Primary | ICD-10-CM

## 2018-08-07 DIAGNOSIS — E11.8 TYPE 2 DIABETES MELLITUS WITH COMPLICATION, UNSPECIFIED LONG TERM INSULIN USE STATUS: ICD-10-CM

## 2018-08-07 PROBLEM — E11.9 TYPE 2 DIABETES MELLITUS: Status: RESOLVED | Noted: 2018-05-12 | Resolved: 2018-08-07

## 2018-08-07 LAB
ALBUMIN SERPL-MCNC: 4.29 G/DL (ref 3.2–4.8)
ALBUMIN/GLOB SERPL: 1.7 G/DL (ref 1.5–2.5)
ALP SERPL-CCNC: 42 U/L (ref 25–100)
ALT SERPL-CCNC: 17 U/L (ref 7–40)
AST SERPL-CCNC: 22 U/L (ref 0–33)
BASOPHILS # BLD AUTO: 0.01 10*3/MM3 (ref 0–0.2)
BASOPHILS NFR BLD AUTO: 0.1 % (ref 0–1)
BILIRUB BLD-MCNC: NEGATIVE MG/DL
BILIRUB SERPL-MCNC: 0.5 MG/DL (ref 0.3–1.2)
BUN SERPL-MCNC: 21 MG/DL (ref 9–23)
BUN/CREAT SERPL: 23.9 (ref 7–25)
CALCIUM SERPL-MCNC: 8.8 MG/DL (ref 8.7–10.4)
CHLORIDE SERPL-SCNC: 109 MMOL/L (ref 99–109)
CHOLEST SERPL-MCNC: 106 MG/DL (ref 0–200)
CLARITY, POC: CLEAR
CO2 SERPL-SCNC: 30 MMOL/L (ref 20–31)
COLOR UR: YELLOW
CREAT SERPL-MCNC: 0.88 MG/DL (ref 0.6–1.3)
EOSINOPHIL # BLD AUTO: 0.54 10*3/MM3 (ref 0–0.3)
EOSINOPHIL NFR BLD AUTO: 6.1 % (ref 0–3)
ERYTHROCYTE [DISTWIDTH] IN BLOOD BY AUTOMATED COUNT: 14.3 % (ref 11.3–14.5)
GLOBULIN SER CALC-MCNC: 2.5 GM/DL
GLUCOSE BLDC GLUCOMTR-MCNC: 70 MG/DL (ref 70–130)
GLUCOSE SERPL-MCNC: 80 MG/DL (ref 70–100)
GLUCOSE UR STRIP-MCNC: ABNORMAL MG/DL
HBA1C MFR BLD: 7.6 % (ref 4.8–5.6)
HCT VFR BLD AUTO: 44.8 % (ref 38.9–50.9)
HDLC SERPL-MCNC: 39 MG/DL (ref 40–60)
HGB BLD-MCNC: 14.2 G/DL (ref 13.1–17.5)
IMM GRANULOCYTES # BLD: 0.02 10*3/MM3 (ref 0–0.03)
IMM GRANULOCYTES NFR BLD: 0.2 % (ref 0–0.6)
KETONES UR QL: NEGATIVE
LDLC SERPL CALC-MCNC: 52 MG/DL (ref 0–100)
LEUKOCYTE EST, POC: NEGATIVE
LYMPHOCYTES # BLD AUTO: 2.53 10*3/MM3 (ref 0.6–4.8)
LYMPHOCYTES NFR BLD AUTO: 28.7 % (ref 24–44)
MCH RBC QN AUTO: 29.1 PG (ref 27–31)
MCHC RBC AUTO-ENTMCNC: 31.7 G/DL (ref 32–36)
MCV RBC AUTO: 91.8 FL (ref 80–99)
MONOCYTES # BLD AUTO: 0.67 10*3/MM3 (ref 0–1)
MONOCYTES NFR BLD AUTO: 7.6 % (ref 0–12)
NEUTROPHILS # BLD AUTO: 5.05 10*3/MM3 (ref 1.5–8.3)
NEUTROPHILS NFR BLD AUTO: 57.3 % (ref 41–71)
NITRITE UR-MCNC: NEGATIVE MG/ML
PH UR: 5 [PH] (ref 5–8)
PLATELET # BLD AUTO: 218 10*3/MM3 (ref 150–450)
POC CREATININE URINE: 200
POC MICROALBUMIN URINE: 10
POTASSIUM SERPL-SCNC: 4.4 MMOL/L (ref 3.5–5.5)
PROT SERPL-MCNC: 6.8 G/DL (ref 5.7–8.2)
PROT UR STRIP-MCNC: NEGATIVE MG/DL
RBC # BLD AUTO: 4.88 10*6/MM3 (ref 4.2–5.76)
RBC # UR STRIP: ABNORMAL /UL
SODIUM SERPL-SCNC: 145 MMOL/L (ref 132–146)
SP GR UR: 1.02 (ref 1–1.03)
T4 FREE SERPL-MCNC: 0.79 NG/DL (ref 0.89–1.76)
TRIGL SERPL-MCNC: 75 MG/DL (ref 0–150)
TSH SERPL DL<=0.005 MIU/L-ACNC: 2.37 MIU/ML (ref 0.35–5.35)
URATE SERPL-MCNC: 4.5 MG/DL (ref 3.7–9.2)
UROBILINOGEN UR QL: NORMAL
VLDLC SERPL CALC-MCNC: 15 MG/DL
WBC # BLD AUTO: 8.82 10*3/MM3 (ref 3.5–10.8)

## 2018-08-07 PROCEDURE — 82044 UR ALBUMIN SEMIQUANTITATIVE: CPT | Performed by: FAMILY MEDICINE

## 2018-08-07 PROCEDURE — G0009 ADMIN PNEUMOCOCCAL VACCINE: HCPCS | Performed by: FAMILY MEDICINE

## 2018-08-07 PROCEDURE — 36415 COLL VENOUS BLD VENIPUNCTURE: CPT | Performed by: FAMILY MEDICINE

## 2018-08-07 PROCEDURE — 99204 OFFICE O/P NEW MOD 45 MIN: CPT | Performed by: FAMILY MEDICINE

## 2018-08-07 PROCEDURE — 82962 GLUCOSE BLOOD TEST: CPT | Performed by: FAMILY MEDICINE

## 2018-08-07 PROCEDURE — 81003 URINALYSIS AUTO W/O SCOPE: CPT | Performed by: FAMILY MEDICINE

## 2018-08-07 PROCEDURE — 90732 PPSV23 VACC 2 YRS+ SUBQ/IM: CPT | Performed by: FAMILY MEDICINE

## 2018-08-07 NOTE — PROGRESS NOTES
Subjective   Kvng Li is a 68 y.o. male.     History of Present Illness   New patient to the office.  He reports recent and previous care with Dr. Honeycutt.  He also reports ongoing care with his endocrinologist Dr. Curtis Kramer.  He is here to follow up on his HTN, HLD and JEFE.  He is tolerating all of his medications well with no adverse events.  He describes good blood pressure control.  He describes a heart healthy diet with routine aerobic activity.  He reports stable moods.  He voices no acute symptoms.  He is anticipating lab evaluation and medication refills.  He is amendable to updating his immunizations.    Review of Systems   Constitutional: Negative for chills and fever.   HENT: Negative for trouble swallowing.    Eyes: Negative for visual disturbance.   Respiratory: Negative for cough and shortness of breath.    Cardiovascular: Negative for chest pain, palpitations and leg swelling.   Gastrointestinal: Negative for abdominal pain, diarrhea, nausea and vomiting.   Endocrine: Negative for polydipsia, polyphagia and polyuria.   Genitourinary: Negative for difficulty urinating.   Musculoskeletal: Negative for arthralgias and myalgias.   Skin: Negative for rash.   Neurological: Negative for weakness and headaches.   Hematological: Does not bruise/bleed easily.   Psychiatric/Behavioral: Negative for sleep disturbance. The patient is not nervous/anxious.        Objective   Physical Exam   Constitutional: He is oriented to person, place, and time. He appears well-developed and well-nourished. He is cooperative.   HENT:   Head: Normocephalic and atraumatic.   Right Ear: Hearing and external ear normal.   Left Ear: Hearing and external ear normal.   Nose: Nose normal.   Mouth/Throat: Uvula is midline, oropharynx is clear and moist and mucous membranes are normal.   Eyes: Pupils are equal, round, and reactive to light. Conjunctivae and EOM are normal. No scleral icterus.   Neck: Trachea normal and normal  range of motion. Neck supple. No JVD present. Carotid bruit is not present. No thyromegaly present.   Cardiovascular: Normal rate, regular rhythm, normal heart sounds and intact distal pulses.    Pulmonary/Chest: Effort normal and breath sounds normal.   Abdominal: Soft. Bowel sounds are normal. There is no hepatosplenomegaly. There is no tenderness.   Musculoskeletal: Normal range of motion.    Kvng had a diabetic foot exam performed today.   During the foot exam he had a monofilament test performed.  Lymphadenopathy:     He has no cervical adenopathy.   Neurological: He is alert and oriented to person, place, and time. He has normal strength and normal reflexes. No sensory deficit. Gait normal.   Skin: Skin is warm and dry.   Psychiatric: He has a normal mood and affect. His speech is normal and behavior is normal. Judgment and thought content normal. Cognition and memory are normal.   Nursing note and vitals reviewed.      Assessment/Plan   Diagnoses and all orders for this visit:    Essential hypertension  -     POC Urinalysis Dipstick, Automated  -     POC Microalbumin  -     Comprehensive Metabolic Panel  -     Lipid Panel  -     CBC & Differential  -     TSH  -     T4, Free  -     Uric Acid  -     Hemoglobin A1c  - Amlodipine 10 mg po once daily  - Lisinopril 20 mg po once daily  - Healthy heart diet  - Weight loss  - Daily aerobic exercise  - Routine blood pressure monitoring  - [place] Heart Disease and Stroke Prevention Task Force pamphlet reviewed and administered.    Familial hypercholesterolemia  -     POC Urinalysis Dipstick, Automated  -     POC Microalbumin  -     Comprehensive Metabolic Panel  -     Lipid Panel  -     CBC & Differential  -     TSH  -     T4, Free  -     Uric Acid  -     Hemoglobin A1c  - Crestor 20 mg po once daily  - Low cholesterol diet (< 200 mg / day)  - Daily aerobic exercise  - Aspirin 81 mg po once daily    JEFE (generalized anxiety disorder)  -     Comprehensive Metabolic  Panel  -     TSH  -     T4, Free  - Lexapro 10 mg po once daily  - Routine counseling advised  - Sleep / wake hygiene  -  Avoid excessive caffeine or etoh intake    Immunization Due        - Prevnar (PCV13) February 8, 2017        - Pneumovax (PPV23) administer today        - Vaccine counseling and current VIS is provided for immunizations administered today.     RTC in February 2019 for annual

## 2018-08-09 NOTE — PROGRESS NOTES
Your lab results are all satisfactory.  Please continue scheduled follow up appointments with your endocrinologist.  If you have any questions or concerns, please don't hesitate to contact us.  Thank you.

## 2019-02-20 ENCOUNTER — OFFICE VISIT (OUTPATIENT)
Dept: FAMILY MEDICINE CLINIC | Facility: CLINIC | Age: 69
End: 2019-02-20

## 2019-02-20 ENCOUNTER — LAB REQUISITION (OUTPATIENT)
Dept: LAB | Facility: HOSPITAL | Age: 69
End: 2019-02-20

## 2019-02-20 VITALS
SYSTOLIC BLOOD PRESSURE: 116 MMHG | WEIGHT: 214.4 LBS | OXYGEN SATURATION: 98 % | RESPIRATION RATE: 16 BRPM | BODY MASS INDEX: 31.76 KG/M2 | HEART RATE: 51 BPM | TEMPERATURE: 97.5 F | HEIGHT: 69 IN | DIASTOLIC BLOOD PRESSURE: 74 MMHG

## 2019-02-20 DIAGNOSIS — Z23 IMMUNIZATION DUE: ICD-10-CM

## 2019-02-20 DIAGNOSIS — Z11.4 ENCOUNTER FOR SCREENING FOR HIV: ICD-10-CM

## 2019-02-20 DIAGNOSIS — Z00.00 ROUTINE GENERAL MEDICAL EXAMINATION AT A HEALTH CARE FACILITY: ICD-10-CM

## 2019-02-20 DIAGNOSIS — E78.01 FAMILIAL HYPERCHOLESTEROLEMIA: ICD-10-CM

## 2019-02-20 DIAGNOSIS — F41.1 GAD (GENERALIZED ANXIETY DISORDER): ICD-10-CM

## 2019-02-20 DIAGNOSIS — Z12.11 SCREENING FOR COLON CANCER: ICD-10-CM

## 2019-02-20 DIAGNOSIS — I10 ESSENTIAL HYPERTENSION: ICD-10-CM

## 2019-02-20 DIAGNOSIS — Z00.00 INITIAL MEDICARE ANNUAL WELLNESS VISIT: Primary | ICD-10-CM

## 2019-02-20 DIAGNOSIS — Z12.5 SCREENING FOR PROSTATE CANCER: ICD-10-CM

## 2019-02-20 LAB
ALBUMIN SERPL-MCNC: 4.55 G/DL (ref 3.2–4.8)
ALBUMIN/GLOB SERPL: 2.3 G/DL (ref 1.5–2.5)
ALP SERPL-CCNC: 45 U/L (ref 25–100)
ALT SERPL W P-5'-P-CCNC: 19 U/L (ref 7–40)
ANION GAP SERPL CALCULATED.3IONS-SCNC: 7 MMOL/L (ref 3–11)
ARTICHOKE IGE QN: 58 MG/DL (ref 0–130)
AST SERPL-CCNC: 26 U/L (ref 0–33)
BASOPHILS # BLD AUTO: 0.01 10*3/MM3 (ref 0–0.2)
BASOPHILS NFR BLD AUTO: 0.1 % (ref 0–1)
BILIRUB BLD-MCNC: NEGATIVE MG/DL
BILIRUB SERPL-MCNC: 0.6 MG/DL (ref 0.3–1.2)
BUN BLD-MCNC: 18 MG/DL (ref 9–23)
BUN/CREAT SERPL: 18.6 (ref 7–25)
CALCIUM SPEC-SCNC: 9.2 MG/DL (ref 8.7–10.4)
CHLORIDE SERPL-SCNC: 106 MMOL/L (ref 99–109)
CHOLEST SERPL-MCNC: 115 MG/DL (ref 0–200)
CLARITY, POC: CLEAR
CO2 SERPL-SCNC: 29 MMOL/L (ref 20–31)
COLOR UR: YELLOW
CREAT BLD-MCNC: 0.97 MG/DL (ref 0.6–1.3)
CRP SERPL-MCNC: 0.06 MG/DL (ref 0–1)
DEPRECATED RDW RBC AUTO: 47.7 FL (ref 37–54)
EOSINOPHIL # BLD AUTO: 0.61 10*3/MM3 (ref 0–0.3)
EOSINOPHIL NFR BLD AUTO: 7 % (ref 0–3)
ERYTHROCYTE [DISTWIDTH] IN BLOOD BY AUTOMATED COUNT: 13.9 % (ref 11.3–14.5)
GFR SERPL CREATININE-BSD FRML MDRD: 77 ML/MIN/1.73
GLOBULIN UR ELPH-MCNC: 2 GM/DL
GLUCOSE BLD-MCNC: 102 MG/DL (ref 70–100)
GLUCOSE UR STRIP-MCNC: NEGATIVE MG/DL
HCT VFR BLD AUTO: 47.6 % (ref 38.9–50.9)
HCV AB SER DONR QL: NORMAL
HDLC SERPL-MCNC: 38 MG/DL (ref 40–60)
HGB BLD-MCNC: 15.1 G/DL (ref 13.1–17.5)
IMM GRANULOCYTES # BLD AUTO: 0.03 10*3/MM3 (ref 0–0.05)
IMM GRANULOCYTES NFR BLD AUTO: 0.3 % (ref 0–0.6)
KETONES UR QL: ABNORMAL
LEUKOCYTE EST, POC: NEGATIVE
LYMPHOCYTES # BLD AUTO: 2.27 10*3/MM3 (ref 0.6–4.8)
LYMPHOCYTES NFR BLD AUTO: 25.9 % (ref 24–44)
MCH RBC QN AUTO: 29.7 PG (ref 27–31)
MCHC RBC AUTO-ENTMCNC: 31.7 G/DL (ref 32–36)
MCV RBC AUTO: 93.7 FL (ref 80–99)
MONOCYTES # BLD AUTO: 0.84 10*3/MM3 (ref 0–1)
MONOCYTES NFR BLD AUTO: 9.6 % (ref 0–12)
NEUTROPHILS # BLD AUTO: 5.02 10*3/MM3 (ref 1.5–8.3)
NEUTROPHILS NFR BLD AUTO: 57.4 % (ref 41–71)
NITRITE UR-MCNC: NEGATIVE MG/ML
PH UR: 7 [PH] (ref 5–8)
PLATELET # BLD AUTO: 215 10*3/MM3 (ref 150–450)
PMV BLD AUTO: 10.6 FL (ref 6–12)
POTASSIUM BLD-SCNC: 4.7 MMOL/L (ref 3.5–5.5)
PROT SERPL-MCNC: 6.5 G/DL (ref 5.7–8.2)
PROT UR STRIP-MCNC: NEGATIVE MG/DL
PSA SERPL-MCNC: 0.62 NG/ML (ref 0–4)
RBC # BLD AUTO: 5.08 10*6/MM3 (ref 4.2–5.76)
RBC # UR STRIP: NEGATIVE /UL
SODIUM BLD-SCNC: 142 MMOL/L (ref 132–146)
SP GR UR: 1.02 (ref 1–1.03)
TRIGL SERPL-MCNC: 93 MG/DL (ref 0–150)
TSH SERPL DL<=0.05 MIU/L-ACNC: 1.98 MIU/ML (ref 0.35–5.35)
URATE SERPL-MCNC: 4.3 MG/DL (ref 3.7–9.2)
UROBILINOGEN UR QL: NORMAL
WBC NRBC COR # BLD: 8.75 10*3/MM3 (ref 3.5–10.8)

## 2019-02-20 PROCEDURE — 85025 COMPLETE CBC W/AUTO DIFF WBC: CPT

## 2019-02-20 PROCEDURE — 80053 COMPREHEN METABOLIC PANEL: CPT

## 2019-02-20 PROCEDURE — 96160 PT-FOCUSED HLTH RISK ASSMT: CPT | Performed by: FAMILY MEDICINE

## 2019-02-20 PROCEDURE — 84443 ASSAY THYROID STIM HORMONE: CPT

## 2019-02-20 PROCEDURE — G0103 PSA SCREENING: HCPCS

## 2019-02-20 PROCEDURE — 84550 ASSAY OF BLOOD/URIC ACID: CPT

## 2019-02-20 PROCEDURE — 86140 C-REACTIVE PROTEIN: CPT

## 2019-02-20 PROCEDURE — 90662 IIV NO PRSV INCREASED AG IM: CPT | Performed by: FAMILY MEDICINE

## 2019-02-20 PROCEDURE — G0439 PPPS, SUBSEQ VISIT: HCPCS | Performed by: FAMILY MEDICINE

## 2019-02-20 PROCEDURE — 86803 HEPATITIS C AB TEST: CPT

## 2019-02-20 PROCEDURE — G0008 ADMIN INFLUENZA VIRUS VAC: HCPCS | Performed by: FAMILY MEDICINE

## 2019-02-20 PROCEDURE — 80061 LIPID PANEL: CPT

## 2019-02-20 PROCEDURE — 81003 URINALYSIS AUTO W/O SCOPE: CPT | Performed by: FAMILY MEDICINE

## 2019-02-20 PROCEDURE — 99397 PER PM REEVAL EST PAT 65+ YR: CPT | Performed by: FAMILY MEDICINE

## 2019-02-20 RX ORDER — ROSUVASTATIN CALCIUM 20 MG/1
20 TABLET, COATED ORAL NIGHTLY
Qty: 90 TABLET | Refills: 3 | Status: SHIPPED | OUTPATIENT
Start: 2019-02-20 | End: 2020-02-28 | Stop reason: SDUPTHER

## 2019-02-20 RX ORDER — FLURBIPROFEN SODIUM 0.3 MG/ML
SOLUTION/ DROPS OPHTHALMIC
Refills: 4 | COMMUNITY
Start: 2018-12-03 | End: 2021-02-16

## 2019-02-20 RX ORDER — AMLODIPINE BESYLATE 10 MG/1
10 TABLET ORAL DAILY
Qty: 90 TABLET | Refills: 3 | Status: SHIPPED | OUTPATIENT
Start: 2019-02-20 | End: 2019-04-08 | Stop reason: SDUPTHER

## 2019-02-20 RX ORDER — ESCITALOPRAM OXALATE 10 MG/1
10 TABLET ORAL DAILY
Qty: 90 TABLET | Refills: 3 | Status: SHIPPED | OUTPATIENT
Start: 2019-02-20 | End: 2019-04-08 | Stop reason: SDUPTHER

## 2019-02-20 RX ORDER — METFORMIN HYDROCHLORIDE 500 MG/1
1500 TABLET, EXTENDED RELEASE ORAL
Refills: 4 | COMMUNITY
Start: 2019-02-18 | End: 2020-11-30

## 2019-02-20 NOTE — PROGRESS NOTES
The ABCs of the Annual Wellness Visit    HEALTH RISK ASSESSMENT  INITIAL Medicare Wellness Visit  1950    Recent Hospitalizations: NONE    Current Medical Providers: Patient Care Team:  Damion Esquivel MD as PCP - General (Family Medicine)    Smoking Status   Social History    Tobacco Use      Smoking status: Never Smoker      Smokeless tobacco: Never Used    Alcohol Consumption    reports that he drinks about 1.2 oz of alcohol per week.    Depression Screen   PHQ-2 Depression Screening  Little interest or pleasure in doing things? 0   Feeling down, depressed, or hopeless? 0   PHQ-2 Total Score 0       Health Habits and Functional and Cognitive Screening  Functional & Cognitive Status 2/20/2019   Do you have difficulty preparing food and eating? No   Do you have difficulty bathing yourself, getting dressed or grooming yourself? No   Do you have difficulty using the toilet? No   Do you have difficulty moving around from place to place? No   Do you have trouble with steps or getting out of a bed or a chair? No   In the past year have you fallen or experienced a near fall? No   Current Diet Well Balanced Diet   Dental Exam Up to date   Eye Exam Up to date   Exercise (times per week) 7 times per week   Current Exercise Activities Include Walking   Do you need help using the phone?  No   Are you deaf or do you have serious difficulty hearing?  No   Do you need help with transportation? No   Do you need help shopping? No   Do you need help preparing meals?  No   Do you need help with housework?  No   Do you need help with laundry? No   Do you need help taking your medications? No   Do you need help managing money? No   Do you ever drive or ride in a car without wearing a seat belt? No   Have you felt unusual stress, anger or loneliness in the last month? No   Who do you live with? Spouse   If you need help, do you have trouble finding someone available to you? No   Have you been bothered in the last four weeks by  sexual problems? No   Do you have difficulty concentrating, remembering or making decisions? No        Fall Risk Assessment  Fallen in past 6 months: 0--> No  Mental Status: 0--> no mental status change  Mobility: 0--> No mobility issues  Medications: 0--> No meds  Total Fall Risk Score: 1    Does the patient have evidence of cognitive impairment? No  Aspirin use counseling: Aspirin 81 mg po once daily advised.    Recent Lab Results:  Lab Results   Component Value Date    GLU 80 08/07/2018    BUN 21 08/07/2018    CREATININE 0.88 08/07/2018    EGFRIFNONA 86 08/07/2018    EGFRIFAFRI 104 08/07/2018    BCR 23.9 08/07/2018     08/07/2018    K 4.4 08/07/2018    CO2 30.0 08/07/2018    CALCIUM 8.8 08/07/2018    PROTENTOTREF 6.8 08/07/2018    ALBUMIN 4.29 08/07/2018    LABGLOBREF 2.5 08/07/2018    LABIL2 1.7 08/07/2018    BILITOT 0.5 08/07/2018    ALKPHOS 42 08/07/2018    AST 22 08/07/2018    ALT 17 08/07/2018       Lab Results   Component Value Date    CHLPL 106 08/07/2018    TRIG 75 08/07/2018    HDL 39 (L) 08/07/2018       Lab Results   Component Value Date    HGBA1C 7.60 (H) 08/07/2018       Subjective     History of Present Illness  Kvng Li is a 68 y.o. male who presents for an Initial Wellness Visit.  He continues care with his Endocrinologist Dr. Curtis Andino and describes a recent evaluation in January with an A1c ~7.00%.  He reports that his annual eye exam is current.  He is needing a refill of his blood pressure medication.  He is tolerating it well with no adverse events.  He describes good blood pressure control.    Review of Systems   Constitutional: Negative.    HENT: Negative.    Eyes: Negative.    Respiratory: Negative.    Cardiovascular: Negative.    Gastrointestinal: Negative.    Endocrine: Negative.    Genitourinary: Negative.    Musculoskeletal: Negative.    Skin: Negative.    Allergic/Immunologic: Negative.    Neurological: Negative.    Hematological: Negative.    Psychiatric/Behavioral:  "Negative.        The following portions of the patient's history were reviewed and updated as appropriate: past medical history, past surgical history, allergies, current medications, past family history and social history.        Current Outpatient Medications:   •  amLODIPine (NORVASC) 10 MG tablet, Take 1 tablet by mouth Daily., Disp: 90 tablet, Rfl: 3  •  Empagliflozin (JARDIANCE) 25 MG tablet, Take  by mouth Daily., Disp: , Rfl:   •  escitalopram (LEXAPRO) 10 MG tablet, Take 1 tablet by mouth Daily., Disp: 90 tablet, Rfl: 3  •  Insulin Glargine (TOUJEO SOLOSTAR) 300 UNIT/ML solution pen-injector, Inject 25 Units under the skin Every Night., Disp: , Rfl:   •  lisinopril (PRINIVIL,ZESTRIL) 20 MG tablet, Take 20 mg by mouth Daily., Disp: , Rfl:   •  rosuvastatin (CRESTOR) 20 MG tablet, Take 1 tablet by mouth Every Night., Disp: 90 tablet, Rfl: 3  •  B-D UF III MINI PEN NEEDLES 31G X 5 MM misc, , Disp: , Rfl: 4  •  metFORMIN ER (GLUCOPHAGE-XR) 500 MG 24 hr tablet, , Disp: , Rfl: 4    Objective     Visual Acuity    Visual Acuity Screening    Right eye Left eye Both eyes   Without correction:      With correction: 20/20 20/20 20/20     Vitals:    02/20/19 1000   BP: 116/74   Pulse: 51   Resp: 16   Temp: 97.5 °F (36.4 °C)   SpO2: 98%   Weight: 97.3 kg (214 lb 6.4 oz)   Height: 175.3 cm (69\")   PainSc: 0-No pain     Body mass index is 31.66 kg/m². Discussed the patient's BMI with him. The BMI is above average; BMI management plan is completed.    Physical Exam   Constitutional: He is oriented to person, place, and time. He appears well-developed and well-nourished. He is cooperative.   HENT:   Head: Normocephalic and atraumatic.   Right Ear: Hearing and external ear normal.   Left Ear: Hearing and external ear normal.   Nose: Nose normal.   Mouth/Throat: Uvula is midline, oropharynx is clear and moist and mucous membranes are normal.   Eyes: Conjunctivae and EOM are normal. Pupils are equal, round, and reactive to " light. No scleral icterus.   Neck: Trachea normal and normal range of motion. Neck supple. No JVD present. Carotid bruit is not present. No thyromegaly present.   Cardiovascular: Normal rate, regular rhythm, normal heart sounds and intact distal pulses.   Pulmonary/Chest: Effort normal and breath sounds normal.   Abdominal: Soft. Bowel sounds are normal. There is no hepatosplenomegaly. There is no tenderness.   Musculoskeletal: Normal range of motion.   Lymphadenopathy:     He has no cervical adenopathy.   Neurological: He is alert and oriented to person, place, and time. He has normal strength and normal reflexes. No sensory deficit. Gait normal.   Skin: Skin is warm and dry.   Psychiatric: He has a normal mood and affect. His speech is normal and behavior is normal. Judgment and thought content normal. Cognition and memory are normal.   Nursing note and vitals reviewed.    Compared to one year ago, the patient feels his physical health is the same.  Compared to one year ago, the patient feels his mental health is the same.    Age-appropriate Screening Schedule  Refer to the list below for future screening recommendations based on patient's age, sex and/or medical conditions. Orders for these recommended tests are listed in the plan section. The patient has been provided with a written plan.    Health Maintenance   Topic Date Due   • HEMOGLOBIN A1C  07/15/2019   • DIABETIC FOOT EXAM  08/07/2019   • LIPID PANEL  08/07/2019   • DIABETIC EYE EXAM  08/07/2019   • URINE MICROALBUMIN  08/07/2019   • COLONOSCOPY  08/24/2021   • INFLUENZA VACCINE  Completed   • PNEUMOCOCCAL VACCINES (65+ LOW/MEDIUM RISK)  Completed       Advance Care Planning  has an advance directive - a copy HAS NOT been provided. Have asked the patient to send this to us to add to record.    Identification of Risk Factors  Risk factors include: cardiovascular risk.      Assessment/Plan   Patient Self-Management and Personalized Health Advice  The  patient has been provided with information about: diet, exercise, weight management and prevention of cardiac or vascular disease and preventive services including:   · Advance directive, Counseling for cardiovascular disease risk reduction, Diabetes screening, see lab orders, Exercise counseling provided, Fall Risk assessment done, Fall Risk plan of care done, Glaucoma screening recommended, Influenza vaccine, Nutrition counseling provided, Prostate cancer screening discussed.    Visit Diagnoses:    ICD-10-CM ICD-9-CM   1. Initial Medicare annual wellness visit Z00.00 V70.0   2. Essential hypertension I10 401.9   3. Familial hypercholesterolemia E78.01 272.0   4. Screening for prostate cancer Z12.5 V76.44   5. Screening for colon cancer Z12.11 V76.51   6. Immunization due Z23 V05.9   7. JEFE (generalized anxiety disorder) F41.1 300.02   8. Encounter for screening for HIV  Z11.4 V73.89       Orders Placed This Encounter   Procedures   • Fluzone High Dose =>65Years     Vaccine counseling and current VIS is provided for immunizations administered today.   • Ambulatory Referral For Screening Colonoscopy   • POC Urinalysis Dipstick, Automated     New Medications Ordered This Visit   Medications   • rosuvastatin (CRESTOR) 20 MG tablet     Sig: Take 1 tablet by mouth Every Night.     Dispense:  90 tablet     Refill:  3   • amLODIPine (NORVASC) 10 MG tablet     Sig: Take 1 tablet by mouth Daily.     Dispense:  90 tablet     Refill:  3   • escitalopram (LEXAPRO) 10 MG tablet     Sig: Take 1 tablet by mouth Daily.     Dispense:  90 tablet     Refill:  3       Current outpatient and discharge medications have been reconciled for the patient.  Reviewed by: Damion Esquivel MD    We discussed current recommendations on daily low dose aspirin use concerning cardiovascular disease.  Reviewed use of high risk medication in the elderly: Not applicable.  Reviewed for potential of harmful drug interactions in the elderly: Not  applicable.    Follow Up:  Return in about 1 year (around 2/20/2020), or if symptoms worsen or fail to improve, for Medicare Wellness.     An After Visit Summary and PPPS with all of these plans were given to the patient.         Damion Esquivel MD 02/20/19 4:18 PM

## 2019-03-25 ENCOUNTER — OFFICE VISIT (OUTPATIENT)
Dept: FAMILY MEDICINE CLINIC | Facility: CLINIC | Age: 69
End: 2019-03-25

## 2019-03-25 VITALS
DIASTOLIC BLOOD PRESSURE: 78 MMHG | RESPIRATION RATE: 16 BRPM | WEIGHT: 214.8 LBS | OXYGEN SATURATION: 98 % | HEIGHT: 69 IN | BODY MASS INDEX: 31.81 KG/M2 | HEART RATE: 56 BPM | SYSTOLIC BLOOD PRESSURE: 120 MMHG

## 2019-03-25 DIAGNOSIS — L91.8 ACHROCHORDON: Primary | ICD-10-CM

## 2019-03-25 PROCEDURE — 99212 OFFICE O/P EST SF 10 MIN: CPT | Performed by: FAMILY MEDICINE

## 2019-03-25 NOTE — PROGRESS NOTES
Elfego Li is a 68 y.o. male.     History of Present Illness   He has several skin tags around his neck.  He would like them removed.  He would like to see Dr. Powers (dermatology) and needs a referral.  He voices no other concerns.    Review of Systems   Skin: Negative for rash and wound.       Objective   Physical Exam   Constitutional: He is oriented to person, place, and time. He appears well-developed and well-nourished.   HENT:   Head: Normocephalic and atraumatic.   Right Ear: Hearing normal.   Left Ear: Hearing normal.   Mouth/Throat: Mucous membranes are normal.   Eyes: Conjunctivae and EOM are normal. Pupils are equal, round, and reactive to light.   Neck: Neck supple. No JVD present. No thyromegaly present.   Cardiovascular: Normal rate, regular rhythm and normal heart sounds.   Pulmonary/Chest: Effort normal and breath sounds normal.   Musculoskeletal: Normal range of motion.   Neurological: He is alert and oriented to person, place, and time. He has normal strength. No sensory deficit. Gait normal.   Skin: Skin is warm and dry.   Multiple skin tags around his neck   Psychiatric: He has a normal mood and affect. His behavior is normal. Judgment and thought content normal. Cognition and memory are normal.   Nursing note and vitals reviewed.      Assessment/Plan   Diagnoses and all orders for this visit:    Kevon  -     Ambulatory Referral to Dermatology  - Avoid sun exposure  - ABCD's of skin lesions reviewed  - Continue with good blood sugar control

## 2019-04-08 DIAGNOSIS — F41.1 GAD (GENERALIZED ANXIETY DISORDER): ICD-10-CM

## 2019-04-08 DIAGNOSIS — I10 ESSENTIAL HYPERTENSION: ICD-10-CM

## 2019-04-08 RX ORDER — LISINOPRIL 20 MG/1
20 TABLET ORAL DAILY
OUTPATIENT
Start: 2019-04-08

## 2019-04-08 RX ORDER — LISINOPRIL 20 MG/1
20 TABLET ORAL DAILY
Qty: 90 TABLET | Refills: 3 | Status: SHIPPED | OUTPATIENT
Start: 2019-04-08 | End: 2020-02-28 | Stop reason: SDUPTHER

## 2019-04-08 RX ORDER — ESCITALOPRAM OXALATE 10 MG/1
10 TABLET ORAL DAILY
Qty: 90 TABLET | Refills: 3 | Status: SHIPPED | OUTPATIENT
Start: 2019-04-08 | End: 2020-02-28 | Stop reason: SDUPTHER

## 2019-04-08 RX ORDER — AMLODIPINE BESYLATE 10 MG/1
10 TABLET ORAL DAILY
Qty: 90 TABLET | Refills: 3 | Status: SHIPPED | OUTPATIENT
Start: 2019-04-08 | End: 2020-02-28 | Stop reason: SDUPTHER

## 2019-04-18 ENCOUNTER — TELEPHONE (OUTPATIENT)
Dept: FAMILY MEDICINE CLINIC | Facility: CLINIC | Age: 69
End: 2019-04-18

## 2019-04-18 NOTE — TELEPHONE ENCOUNTER
----- Message from Jesi King sent at 4/18/2019 11:19 AM EDT -----  [facility] and [facility] called and said that the patient called them and asked if he can continue take an antibiotic that was given to him by his dentist, since he has an upcoming dermatology appointment, I told her he should probably call his dentist, but the pharmacist said the patient insisted that we give him advice?    Najma from [facility] [phone]

## 2019-07-08 LAB
ALBUMIN SERPL-MCNC: 4.55 G/DL (ref 3.5–5.2)
ALBUMIN/GLOB SERPL: 2.3 G/DL
ALP SERPL-CCNC: 45 U/L (ref 39–117)
ALT SERPL-CCNC: 19 U/L (ref 1–41)
AST SERPL-CCNC: 26 U/L (ref 1–40)
BASOPHILS # BLD AUTO: 0.01 10E3/MM3 (ref 0–0.2)
BASOPHILS NFR BLD AUTO: 0.1 % (ref 0–1.5)
BILIRUB SERPL-MCNC: 0.6 MG/DL (ref 0.2–1.2)
BUN SERPL-MCNC: 18 MG/DL (ref 8–23)
BUN/CREAT SERPL: 18.6 (ref 7–25)
CALCIUM SERPL-MCNC: 9.2 MG/DL (ref 8.6–10.5)
CHLORIDE SERPL-SCNC: 106 MMOL/L (ref 98–107)
CHOLEST SERPL-MCNC: 115 MG/DL (ref 0–200)
CO2 SERPL-SCNC: 29 MMOL/L (ref 22–29)
CREAT SERPL-MCNC: 0.97 MG/DL (ref 0.76–1.27)
CRP SERPL-MCNC: 0.6 MG/L (ref 0–10)
EOSINOPHIL # BLD AUTO: 0.61 10E3/MM3 (ref 0–0.4)
EOSINOPHIL NFR BLD AUTO: 7 % (ref 0.3–6.2)
ERYTHROCYTE [DISTWIDTH] IN BLOOD BY AUTOMATED COUNT: 13.9 % (ref 12.3–15.4)
GLOBULIN SER CALC-MCNC: 2 G/DL
GLUCOSE SERPL-MCNC: 102 MG/DL (ref 65–99)
HCT VFR BLD AUTO: 47.6 % (ref 37.5–51)
HCV AB S/CO SERPL IA: NON REACTIVE S/CO RATIO (ref 0–0.9)
HDLC SERPL-MCNC: 38 MG/DL (ref 40–60)
HGB BLD-MCNC: 15.1 G/DL (ref 13–17.7)
IMM GRANULOCYTES # BLD AUTO: 0.03 10E3/MM3 (ref 0–0.05)
IMM GRANULOCYTES NFR BLD AUTO: 0.3 % (ref 0–0.5)
LDLC SERPL CALC-MCNC: 58 MG/DL (ref 0–99)
LYMPHOCYTES # BLD AUTO: 2.27 10E3/MM3 (ref 0.7–3.1)
LYMPHOCYTES NFR BLD AUTO: 25.9 % (ref 19.6–45.3)
MCH RBC QN AUTO: 29.7 PG (ref 26.6–33)
MCHC RBC AUTO-ENTMCNC: 31.7 G/DL (ref 31.5–35.7)
MCV RBC AUTO: 93.7 FL (ref 79–97)
MONOCYTES # BLD AUTO: 0.84 10E3/MM3 (ref 0.1–0.9)
MONOCYTES NFR BLD AUTO: 9.6 % (ref 5–12)
NEUTROPHILS # BLD AUTO: 5.02 10E3/MM3 (ref 1.7–7)
NEUTROPHILS NFR BLD AUTO: 57.4 % (ref 42.7–76)
PLATELET # BLD AUTO: 215 10E3/MM3 (ref 140–450)
POTASSIUM SERPL-SCNC: 4.7 MMOL/L (ref 3.5–5.2)
PROT SERPL-MCNC: 6.5 G/DL (ref 6–8.5)
PSA SERPL-MCNC: 0.62 NG/ML (ref 0–4)
RBC # BLD AUTO: 5.08 10E6/MM3 (ref 4.14–5.8)
SODIUM SERPL-SCNC: 142 MMOL/L (ref 136–145)
TRIGL SERPL-MCNC: 93 MG/DL (ref 0–150)
TSH SERPL DL<=0.005 MIU/L-ACNC: 1.98 MIU/ML (ref 0.27–4.2)
URATE SERPL-MCNC: 4.3 MG/DL (ref 3.4–7)
VLDLC SERPL CALC-MCNC: 19 MG/DL
WBC # BLD AUTO: 8.75 10E3/MM3 (ref 3.4–10.8)

## 2019-10-28 ENCOUNTER — OFFICE VISIT (OUTPATIENT)
Dept: FAMILY MEDICINE CLINIC | Facility: CLINIC | Age: 69
End: 2019-10-28

## 2019-10-28 VITALS
RESPIRATION RATE: 16 BRPM | WEIGHT: 213 LBS | TEMPERATURE: 97.7 F | OXYGEN SATURATION: 96 % | DIASTOLIC BLOOD PRESSURE: 72 MMHG | HEART RATE: 80 BPM | SYSTOLIC BLOOD PRESSURE: 124 MMHG | BODY MASS INDEX: 31.55 KG/M2 | HEIGHT: 69 IN

## 2019-10-28 DIAGNOSIS — L23.7 ALLERGIC DERMATITIS DUE TO POISON IVY: Primary | ICD-10-CM

## 2019-10-28 PROCEDURE — 90653 IIV ADJUVANT VACCINE IM: CPT | Performed by: FAMILY MEDICINE

## 2019-10-28 PROCEDURE — G0008 ADMIN INFLUENZA VIRUS VAC: HCPCS | Performed by: FAMILY MEDICINE

## 2019-10-28 PROCEDURE — 99213 OFFICE O/P EST LOW 20 MIN: CPT | Performed by: FAMILY MEDICINE

## 2019-10-28 RX ORDER — METHYLPREDNISOLONE 4 MG/1
TABLET ORAL
Qty: 21 TABLET | Refills: 0 | Status: SHIPPED | OUTPATIENT
Start: 2019-10-28 | End: 2019-11-02

## 2019-10-28 NOTE — PROGRESS NOTES
Kvng Li is a 69 y.o. male who presents today for Poison Ivy (3 days now)      Patient states he has rash on the side of his hip and center of lower back he believes is poison ivy. It has been present for 3 days. It is itchy. Staying about the same. Some erythema and mild warmth to touch. Not draining, spreading, or painful. He got into it around his storage shed. He denies fever, N/V/D/C, CP, palpitations, SOA, difficulty swallowing, or edema.          Review of Systems   Constitutional: Negative for fever and unexpected weight loss.   Eyes: Negative for visual disturbance.   Respiratory: Negative for cough, shortness of breath and wheezing.    Cardiovascular: Negative for chest pain and palpitations.   Gastrointestinal: Negative for constipation, diarrhea, nausea, vomiting and GERD.   Musculoskeletal: Negative for joint swelling.   Skin: Positive for rash. Negative for skin lesions.   Allergic/Immunologic: Positive for environmental allergies.        The following portions of the patient's history were reviewed and updated as appropriate: allergies, current medications, past family history, past medical history, past social history, past surgical history and problem list.    Current Outpatient Medications on File Prior to Visit   Medication Sig Dispense Refill   • amLODIPine (NORVASC) 10 MG tablet Take 1 tablet by mouth Daily. 90 tablet 3   • B-D UF III MINI PEN NEEDLES 31G X 5 MM misc   4   • Empagliflozin (JARDIANCE) 25 MG tablet Take  by mouth Daily.     • escitalopram (LEXAPRO) 10 MG tablet Take 1 tablet by mouth Daily. 90 tablet 3   • Insulin Glargine (TOUJEO SOLOSTAR) 300 UNIT/ML solution pen-injector Inject 25 Units under the skin Every Night.     • lisinopril (PRINIVIL,ZESTRIL) 20 MG tablet Take 1 tablet by mouth Daily. 90 tablet 3   • metFORMIN ER (GLUCOPHAGE-XR) 500 MG 24 hr tablet   4   • rosuvastatin (CRESTOR) 20 MG tablet Take 1 tablet by mouth Every Night. 90 tablet 3     No current  "facility-administered medications on file prior to visit.        No Known Allergies     Visit Vitals  /72   Pulse 80   Temp 97.7 °F (36.5 °C)   Resp 16   Ht 175.3 cm (69\")   Wt 96.6 kg (213 lb)   SpO2 96%   BMI 31.45 kg/m²        Physical Exam   Constitutional: He is oriented to person, place, and time. He appears well-developed and well-nourished. No distress.   HENT:   Head: Atraumatic.   Eyes: EOM are normal.   Neck: Normal range of motion. Neck supple.   Cardiovascular: Normal rate, regular rhythm, normal heart sounds and intact distal pulses. Exam reveals no gallop and no friction rub.   No murmur heard.  Pulmonary/Chest: Effort normal and breath sounds normal. No respiratory distress. He has no wheezes. He has no rales.   Musculoskeletal: He exhibits no edema.   Neurological: He is alert and oriented to person, place, and time.   Skin: Skin is warm and dry. He is not diaphoretic.        Psychiatric: He has a normal mood and affect. His behavior is normal.             Problems Addressed this Visit     None      Visit Diagnoses     Allergic dermatitis due to poison ivy    -  Primary    Relevant Medications    methylPREDNISolone (MEDROL, JOHN,) 4 MG tablet          Return in about 4 months (around 2/28/2020) for Medicare Wellness.    Parts of this office note have been dictated by voice recognition software. Grammatical and/or spelling errors may be present.    Rojelio Gould MD   10/28/2019            "

## 2020-02-28 ENCOUNTER — OFFICE VISIT (OUTPATIENT)
Dept: FAMILY MEDICINE CLINIC | Facility: CLINIC | Age: 70
End: 2020-02-28

## 2020-02-28 VITALS
HEART RATE: 78 BPM | SYSTOLIC BLOOD PRESSURE: 126 MMHG | TEMPERATURE: 97.2 F | BODY MASS INDEX: 31.4 KG/M2 | OXYGEN SATURATION: 96 % | HEIGHT: 69 IN | RESPIRATION RATE: 20 BRPM | WEIGHT: 212 LBS | DIASTOLIC BLOOD PRESSURE: 62 MMHG

## 2020-02-28 DIAGNOSIS — I10 ESSENTIAL HYPERTENSION: ICD-10-CM

## 2020-02-28 DIAGNOSIS — F41.1 GAD (GENERALIZED ANXIETY DISORDER): ICD-10-CM

## 2020-02-28 DIAGNOSIS — Z00.00 MEDICARE ANNUAL WELLNESS VISIT, SUBSEQUENT: Primary | ICD-10-CM

## 2020-02-28 DIAGNOSIS — Z12.5 ENCOUNTER FOR SCREENING FOR MALIGNANT NEOPLASM OF PROSTATE: ICD-10-CM

## 2020-02-28 DIAGNOSIS — E78.01 FAMILIAL HYPERCHOLESTEROLEMIA: ICD-10-CM

## 2020-02-28 PROCEDURE — G0439 PPPS, SUBSEQ VISIT: HCPCS | Performed by: FAMILY MEDICINE

## 2020-02-28 PROCEDURE — 96160 PT-FOCUSED HLTH RISK ASSMT: CPT | Performed by: FAMILY MEDICINE

## 2020-02-28 RX ORDER — ROSUVASTATIN CALCIUM 20 MG/1
20 TABLET, COATED ORAL NIGHTLY
Qty: 90 TABLET | Refills: 3 | Status: SHIPPED | OUTPATIENT
Start: 2020-02-28 | End: 2020-12-07

## 2020-02-28 RX ORDER — ESCITALOPRAM OXALATE 10 MG/1
10 TABLET ORAL DAILY
Qty: 90 TABLET | Refills: 3 | Status: SHIPPED | OUTPATIENT
Start: 2020-02-28 | End: 2021-03-23

## 2020-02-28 RX ORDER — AMLODIPINE BESYLATE 10 MG/1
10 TABLET ORAL DAILY
Qty: 90 TABLET | Refills: 3 | Status: SHIPPED | OUTPATIENT
Start: 2020-02-28 | End: 2021-03-23

## 2020-02-28 RX ORDER — LISINOPRIL 20 MG/1
20 TABLET ORAL DAILY
Qty: 90 TABLET | Refills: 3 | Status: SHIPPED | OUTPATIENT
Start: 2020-02-28 | End: 2021-03-23

## 2020-02-28 NOTE — PROGRESS NOTES
The ABCs of the Annual Wellness Visit  Subsequent Medicare Wellness Visit    Chief Complaint   Patient presents with   • Medicare Wellness-subsequent       Subjective   History of Present Illness:  Kvng Li is a 69 y.o. male who presents for a Subsequent Medicare Wellness Visit.    HEALTH RISK ASSESSMENT    Recent Hospitalizations:  No hospitalization(s) within the last year.    Current Medical Providers:  Patient Care Team:  Rojelio Gould MD as PCP - General (Family Medicine)    Smoking Status:  Social History     Tobacco Use   Smoking Status Never Smoker   Smokeless Tobacco Never Used       Alcohol Consumption:  Social History     Substance and Sexual Activity   Alcohol Use Yes   • Alcohol/week: 5.0 standard drinks   • Types: 5 Cans of beer per week   • Frequency: 4 or more times a week   • Drinks per session: 1 or 2       Depression Screen:   PHQ-2/PHQ-9 Depression Screening 2/28/2020   Little interest or pleasure in doing things 0   Feeling down, depressed, or hopeless 0   Total Score 0       Fall Risk Screen:  MARU Fall Risk Assessment was completed, and patient is at LOW risk for falls.Assessment completed on:2/28/2020    Health Habits and Functional and Cognitive Screening:  Functional & Cognitive Status 2/28/2020   Do you have difficulty preparing food and eating? No   Do you have difficulty bathing yourself, getting dressed or grooming yourself? No   Do you have difficulty using the toilet? No   Do you have difficulty moving around from place to place? No   Do you have trouble with steps or getting out of a bed or a chair? No   Current Diet Well Balanced Diet   Dental Exam Not up to date   Eye Exam Not up to date   Exercise (times per week) 3 times per week   Current Exercise Activities Include Stationary Bicycling/Spin Class   Do you need help using the phone?  No   Are you deaf or do you have serious difficulty hearing?  No   Do you need help with transportation? No   Do you need help  shopping? No   Do you need help preparing meals?  No   Do you need help with housework?  No   Do you need help with laundry? No   Do you need help taking your medications? No   Do you need help managing money? No   Do you ever drive or ride in a car without wearing a seat belt? No   Have you felt unusual stress, anger or loneliness in the last month? No   Who do you live with? Spouse   If you need help, do you have trouble finding someone available to you? No   Have you been bothered in the last four weeks by sexual problems? Yes   Do you have difficulty concentrating, remembering or making decisions? -         Does the patient have evidence of cognitive impairment? No    Asprin use counseling:Taking ASA appropriately as indicated    Age-appropriate Screening Schedule:  Refer to the list below for future screening recommendations based on patient's age, sex and/or medical conditions. Orders for these recommended tests are listed in the plan section. The patient has been provided with a written plan.    Health Maintenance   Topic Date Due   • LIPID PANEL  02/20/2020   • DIABETIC EYE EXAM  04/17/2020 (Originally 8/7/2019)   • HEMOGLOBIN A1C  08/24/2020   • DIABETIC FOOT EXAM  11/18/2020   • URINE MICROALBUMIN  11/18/2020   • COLONOSCOPY  08/24/2021   • INFLUENZA VACCINE  Completed   • TDAP/TD VACCINES  Discontinued   • ZOSTER VACCINE  Discontinued          The following portions of the patient's history were reviewed and updated as appropriate: allergies, current medications, past family history, past medical history, past social history, past surgical history and problem list.    Outpatient Medications Prior to Visit   Medication Sig Dispense Refill   • B-D UF III MINI PEN NEEDLES 31G X 5 MM misc   4   • Empagliflozin (JARDIANCE) 25 MG tablet Take  by mouth Daily.     • Insulin Glargine (TOUJEO SOLOSTAR) 300 UNIT/ML solution pen-injector Inject 25 Units under the skin Every Night.     • metFORMIN ER (GLUCOPHAGE-XR)  500 MG 24 hr tablet   4   • amLODIPine (NORVASC) 10 MG tablet Take 1 tablet by mouth Daily. 90 tablet 3   • escitalopram (LEXAPRO) 10 MG tablet Take 1 tablet by mouth Daily. 90 tablet 3   • lisinopril (PRINIVIL,ZESTRIL) 20 MG tablet Take 1 tablet by mouth Daily. 90 tablet 3   • rosuvastatin (CRESTOR) 20 MG tablet Take 1 tablet by mouth Every Night. 90 tablet 3     No facility-administered medications prior to visit.        Patient Active Problem List   Diagnosis   • HTN (hypertension)   • HLD (hyperlipidemia)   • JEFE (generalized anxiety disorder)   • DM2 (diabetes mellitus, type 2) (CMS/[facility])   • Medicare annual wellness visit, subsequent       Advanced Care Planning:  ACP discussion was held with the patient during this visit. Patient does not have an advance directive, information provided.    Review of Systems   Constitutional: Negative for activity change, appetite change, fever and unexpected weight change.   HENT: Negative for congestion, ear pain and sore throat.    Eyes: Negative for visual disturbance.   Respiratory: Negative for cough, shortness of breath and wheezing.    Cardiovascular: Negative for chest pain, palpitations and leg swelling.   Gastrointestinal: Negative for abdominal pain, blood in stool, constipation, diarrhea, nausea and vomiting.   Endocrine: Negative for polydipsia and polyuria.   Genitourinary: Negative for dysuria and hematuria.   Musculoskeletal: Negative for arthralgias, back pain and myalgias.   Skin: Negative for rash.   Allergic/Immunologic: Negative for immunocompromised state.   Neurological: Negative for dizziness, seizures, syncope, weakness and headaches.   Hematological: Does not bruise/bleed easily.   Psychiatric/Behavioral: Negative for hallucinations, self-injury and suicidal ideas. The patient is not nervous/anxious.        Compared to one year ago, the patient feels his physical health is the same.  Compared to one year ago, the patient feels his mental health is the  "same.    Reviewed chart for potential of high risk medication in the elderly: yes  Reviewed chart for potential of harmful drug interactions in the elderly:yes    Objective         Vitals:    02/28/20 1045   BP: 126/62   Pulse: 78   Resp: 20   Temp: 97.2 °F (36.2 °C)   TempSrc: Temporal   SpO2: 96%   Weight: 96.2 kg (212 lb)   Height: 175.3 cm (69\")   PainSc: 0-No pain       Body mass index is 31.31 kg/m².  Discussed the patient's BMI with him. The BMI is above average; no BMI management plan is appropriate..    Physical Exam   Constitutional: He is oriented to person, place, and time. He appears well-developed and well-nourished. No distress.   HENT:   Head: Normocephalic and atraumatic.   Right Ear: Hearing, tympanic membrane, external ear and ear canal normal.   Left Ear: Hearing, tympanic membrane, external ear and ear canal normal.   Nose: Nose normal.   Mouth/Throat: Oropharynx is clear and moist.   Eyes: Pupils are equal, round, and reactive to light. EOM are normal. Right eye exhibits no discharge. Left eye exhibits no discharge. No scleral icterus.   Neck: Normal range of motion. Neck supple. No JVD present. No tracheal deviation present. No thyromegaly present.   Cardiovascular: Normal rate, regular rhythm, normal heart sounds and intact distal pulses. Exam reveals no gallop and no friction rub.   No murmur heard.  Pulmonary/Chest: Effort normal and breath sounds normal. No stridor. No respiratory distress. He has no wheezes. He has no rales.   Abdominal: Soft. Bowel sounds are normal. He exhibits no distension and no mass. There is no tenderness. There is no rebound and no guarding. No hernia.   Musculoskeletal: Normal range of motion. He exhibits no edema or tenderness.   Lymphadenopathy:     He has no cervical adenopathy.   Neurological: He is alert and oriented to person, place, and time. No cranial nerve deficit. He exhibits normal muscle tone. Coordination normal.   Skin: Skin is warm and dry. No " rash noted.   Psychiatric: He has a normal mood and affect. His behavior is normal.             Assessment/Plan   Medicare Risks and Personalized Health Plan  CMS Preventative Services Quick Reference  Advance Directive Discussion  Cardiovascular risk  Colon Cancer Screening  Dementia/Memory   Depression/Dysphoria  Diabetic Lab Screening   Fall Risk  Immunizations Discussed/Encouraged (specific immunizations; Shingrix )  Prostate Cancer Screening     The above risks/problems have been discussed with the patient.  Pertinent information has been shared with the patient in the After Visit Summary.  Follow up plans and orders are seen below in the Assessment/Plan Section.    Diagnoses and all orders for this visit:    1. Medicare annual wellness visit, subsequent (Primary)  Assessment & Plan:  The patient is here for health maintenance visit.  Currently, the patient consumes a healthy diet and has an adequate exercise regimen.  Screening lab work is ordered.  Immunizations were reviewed today.  Advice and education was given regarding nutrition, aerobic exercise, routine dental evaluations, routine eye exams, reproductive health, cardiovascular risk reduction, sunscreen use, self skin examination (annual dermatology evaluations) and seatbelt use (general overall safety).  Further recommendations will be given if needed after lab evaluation.  Annual wellness evaluation is recommended.      Orders:  -     CBC & Differential  -     Comprehensive Metabolic Panel  -     Lipid Panel  -     PSA Screen  -     TSH Rfx On Abnormal To Free T4    2. Encounter for screening for malignant neoplasm of prostate   -     PSA Screen    3. Essential hypertension  Assessment & Plan:  Hypertension is unchanged.  Continue current treatment regimen.  Blood pressure will be reassessed at the next regular appointment.    Orders:  -     amLODIPine (NORVASC) 10 MG tablet; Take 1 tablet by mouth Daily.  Dispense: 90 tablet; Refill: 3  -      lisinopril (PRINIVIL,ZESTRIL) 20 MG tablet; Take 1 tablet by mouth Daily.  Dispense: 90 tablet; Refill: 3    4. JEFE (generalized anxiety disorder)  -     escitalopram (LEXAPRO) 10 MG tablet; Take 1 tablet by mouth Daily.  Dispense: 90 tablet; Refill: 3    5. Familial hypercholesterolemia  Assessment & Plan:  Lipid abnormalities are unchanged.  Pharmacotherapy as ordered.  Lipids will be reassessed in 6 months.    Orders:  -     rosuvastatin (CRESTOR) 20 MG tablet; Take 1 tablet by mouth Every Night.  Dispense: 90 tablet; Refill: 3    Other orders  -     Cancel: Hemoglobin A1c  -     Cancel: POC Microalbumin    Follow Up:  Return in about 6 months (around 8/28/2020) for Follow-up.     An After Visit Summary and PPPS were given to the patient.

## 2020-02-28 NOTE — PATIENT INSTRUCTIONS
Medicare Wellness  Personal Prevention Plan of Service     Date of Office Visit:  2020  Encounter Provider:  Rojelio Gould MD  Place of Service:  [facility]  Patient Name: Kvng Li  :  1950    As part of the Medicare Wellness portion of your visit today, we are providing you with this personalized preventive plan of services (PPPS). This plan is based upon recommendations of the [place] Preventive Services Task Force (USPSTF) and the Advisory Committee on Immunization Practices (ACIP).    This lists the preventive care services that should be considered, and provides dates of when you are due. Items listed as completed are up-to-date and do not require any further intervention.    Health Maintenance   Topic Date Due   • MEDICARE ANNUAL WELLNESS  2020   • LIPID PANEL  2020   • DIABETIC EYE EXAM  2020 (Originally 2019)   • HEMOGLOBIN A1C  2020   • DIABETIC FOOT EXAM  2020   • URINE MICROALBUMIN  2020   • COLONOSCOPY  2021   • HEPATITIS C SCREENING  Completed   • Pneumococcal Vaccine Once at 65 Years Old  Completed   • INFLUENZA VACCINE  Completed   • AAA SCREEN (ONE-TIME)  Completed   • TDAP/TD VACCINES  Discontinued   • ZOSTER VACCINE  Discontinued       Orders Placed This Encounter   Procedures   • Comprehensive Metabolic Panel   • Lipid Panel   • PSA Screen   • TSH Rfx On Abnormal To Free T4   • CBC & Differential     Order Specific Question:   Manual Differential     Answer:   No       Return in about 6 months (around 2020) for Follow-up.          Preventive Care 65 Years and Older, Male  Preventive care refers to lifestyle choices and visits with your health care provider that can promote health and wellness.  What does preventive care include?    · A yearly physical exam. This is also called an annual well check.  · Dental exams once or twice a year.  · Routine eye exams. Ask your health care  provider how often you should have your eyes checked.  · Personal lifestyle choices, including:  ? Daily care of your teeth and gums.  ? Regular physical activity.  ? Eating a healthy diet.  ? Avoiding tobacco and drug use.  ? Limiting alcohol use.  ? Practicing safe sex.  ? Taking low doses of aspirin every day.  ? Taking vitamin and mineral supplements as recommended by your health care provider.  What happens during an annual well check?  The services and screenings done by your health care provider during your annual well check will depend on your age, overall health, lifestyle risk factors, and family history of disease.  Counseling  Your health care provider may ask you questions about your:  · Alcohol use.  · Tobacco use.  · Drug use.  · Emotional well-being.  · Home and relationship well-being.  · Sexual activity.  · Eating habits.  · History of falls.  · Memory and ability to understand (cognition).  · Work and work environment.  Screening  You may have the following tests or measurements:  · Height, weight, and BMI.  · Blood pressure.  · Lipid and cholesterol levels. These may be checked every 5 years, or more frequently if you are over 50 years old.  · Skin check.  · Lung cancer screening. You may have this screening every year starting at age 55 if you have a 30-pack-year history of smoking and currently smoke or have quit within the past 15 years.  · Colorectal cancer screening. All adults should have this screening starting at age 50 and continuing until age 75. You will have tests every 1-10 years, depending on your results and the type of screening test. People at increased risk should start screening at an earlier age. Screening tests may include:  ? Guaiac-based fecal occult blood testing.  ? Fecal immunochemical test (FIT).  ? Stool DNA test.  ? Virtual colonoscopy.  ? Sigmoidoscopy. During this test, a flexible tube with a tiny camera (sigmoidoscope) is used to examine your rectum and lower  colon. The sigmoidoscope is inserted through your anus into your rectum and lower colon.  ? Colonoscopy. During this test, a long, thin, flexible tube with a tiny camera (colonoscope) is used to examine your entire colon and rectum.  · Prostate cancer screening. Recommendations will vary depending on your family history and other risks.  · Hepatitis C blood test.  · Hepatitis B blood test.  · Sexually transmitted disease (STD) testing.  · Diabetes screening. This is done by checking your blood sugar (glucose) after you have not eaten for a while (fasting). You may have this done every 1-3 years.  · Abdominal aortic aneurysm (AAA) screening. You may need this if you are a current or former smoker.  · Osteoporosis. You may be screened starting at age 70 if you are at high risk.  Talk with your health care provider about your test results, treatment options, and if necessary, the need for more tests.  Vaccines  Your health care provider may recommend certain vaccines, such as:  · Influenza vaccine. This is recommended every year.  · Tetanus, diphtheria, and acellular pertussis (Tdap, Td) vaccine. You may need a Td booster every 10 years.  · Varicella vaccine. You may need this if you have not been vaccinated.  · Zoster vaccine. You may need this after age 60.  · Measles, mumps, and rubella (MMR) vaccine. You may need at least one dose of MMR if you were born in 1957 or later. You may also need a second dose.  · Pneumococcal 13-valent conjugate (PCV13) vaccine. One dose is recommended after age 65.  · Pneumococcal polysaccharide (PPSV23) vaccine. One dose is recommended after age 65.  · Meningococcal vaccine. You may need this if you have certain conditions.  · Hepatitis A vaccine. You may need this if you have certain conditions or if you travel or work in places where you may be exposed to hepatitis A.  · Hepatitis B vaccine. You may need this if you have certain conditions or if you travel or work in places where you  may be exposed to hepatitis B.  · Haemophilus influenzae type b (Hib) vaccine. You may need this if you have certain risk factors.  Talk to your health care provider about which screenings and vaccines you need and how often you need them.  This information is not intended to replace advice given to you by your health care provider. Make sure you discuss any questions you have with your health care provider.  Document Released: 01/13/2017 Document Revised: 02/07/2019 Document Reviewed: 10/18/2016  [email or web address] Interactive Patient Education © 2019 [employer]    Advance Directive    Advance directives are legal documents that let you make choices ahead of time about your health care and medical treatment in case you become unable to communicate for yourself. Advance directives are a way for you to communicate your wishes to family, friends, and health care providers. This can help convey your decisions about end-of-life care if you become unable to communicate.  Discussing and writing advance directives should happen over time rather than all at once. Advance directives can be changed depending on your situation and what you want, even after you have signed the advance directives.  If you do not have an advance directive, some states assign family decision makers to act on your behalf based on how closely you are related to them. Each state has its own laws regarding advance directives. You may want to check with your health care provider, , or state representative about the laws in your state. There are different types of advance directives, such as:  · Medical power of .  · Living will.  · Do not resuscitate (DNR) or do not attempt resuscitation (DNAR) order.  Health care proxy and medical power of   A health care proxy, also called a health care agent, is a person who is appointed to make medical decisions for you in cases in which you are unable to make the decisions yourself. Generally,  people choose someone they know well and trust to represent their preferences. Make sure to ask this person for an agreement to act as your proxy. A proxy may have to exercise judgment in the event of a medical decision for which your wishes are not known.  A medical power of  is a legal document that names your health care proxy. Depending on the laws in your state, after the document is written, it may also need to be:  · Signed.  · Notarized.  · Dated.  · Copied.  · Witnessed.  · Incorporated into your medical record.  You may also want to appoint someone to manage your financial affairs in a situation in which you are unable to do so. This is called a durable power of  for finances. It is a separate legal document from the durable power of  for health care. You may choose the same person or someone different from your health care proxy to act as your agent in financial matters.  If you do not appoint a proxy, or if there is a concern that the proxy is not acting in your best interests, a court-appointed guardian may be designated to act on your behalf.  Living will  A living will is a set of instructions documenting your wishes about medical care when you cannot express them yourself. Health care providers should keep a copy of your living will in your medical record. You may want to give a copy to family members or friends. To alert caregivers in case of an emergency, you can place a card in your wallet to let them know that you have a living will and where they can find it. A living will is used if you become:  · Terminally ill.  · Incapacitated.  · Unable to communicate or make decisions.  Items to consider in your living will include:  · The use or non-use of life-sustaining equipment, such as dialysis machines and breathing machines (ventilators).  · A DNR or DNAR order, which is the instruction not to use cardiopulmonary resuscitation (CPR) if breathing or heartbeat stops.  · The  use or non-use of tube feeding.  · Withholding of food and fluids.  · Comfort (palliative) care when the goal becomes comfort rather than a cure.  · Organ and tissue donation.  A living will does not give instructions for distributing your money and property if you should pass away. It is recommended that you seek the advice of a  when writing a will. Decisions about taxes, beneficiaries, and asset distribution will be legally binding. This process can relieve your family and friends of any concerns surrounding disputes or questions that may come up about the distribution of your assets.  DNR or DNAR  A DNR or DNAR order is a request not to have CPR in the event that your heart stops beating or you stop breathing. If a DNR or DNAR order has not been made and shared, a health care provider will try to help any patient whose heart has stopped or who has stopped breathing. If you plan to have surgery, talk with your health care provider about how your DNR or DNAR order will be followed if problems occur.  Summary  · Advance directives are the legal documents that allow you to make choices ahead of time about your health care and medical treatment in case you become unable to communicate for yourself.  · The process of discussing and writing advance directives should happen over time. You can change the advance directives, even after you have signed them.  · Advance directives include DNR or DNAR orders, living gilbert, and designating an agent as your medical power of .  This information is not intended to replace advice given to you by your health care provider. Make sure you discuss any questions you have with your health care provider.  Document Released: 03/26/2009 Document Revised: 11/06/2017 Document Reviewed: 11/06/2017  [employer] Interactive Patient Education © 2020 [employer]    Advance Care Planning and Advance Directives     You make decisions on a daily basis - decisions about where you want  to live, your career, your home, your life. Perhaps one of the most important decisions you face is your choice for future medical care. Take time to talk with your family and your healthcare team and start planning today.  Advance Care Planning is a process that can help you:  · Understand possible future healthcare decisions in light of your own experiences  · Reflect on those decision in light of your goals and values  · Discuss your decisions with those closest to you and the healthcare professionals that care for you  · Make a plan by creating a document that reflects your wishes    Surrogate Decision Maker  In the event of a medical emergency, which has left you unable to communicate or to make your own decisions, you would need someone to make decisions for you.  It is important to discuss your preferences for medical treatment with this person while you are in good health.     Qualities of a surrogate decision maker:  • Willing to take on this role and responsibility  • Knows what you want for future medical care  • Willing to follow your wishes even if they don't agree with them  • Able to make difficult medical decisions under stressful circumstances    Advance Directives  These are legal documents you can create that will guide your healthcare team and decision maker(s) when needed. These documents can be stored in the electronic medical record.    · Living Will - a legal document to guide your care if you have a terminal condition or a serious illness and are unable to communicate. States vary by statute in document names/types, but most forms may include one or more of the following:        -  Directions regarding life-prolonging treatments        -  Directions regarding artificially provided nutrition/hydration        -  Choosing a healthcare decision maker        -  Direction regarding organ/tissue donation    · Durable Power of  for Healthcare - this document names an -in-fact to make  medical decisions for you, but it may also allow this person to make personal and financial decisions for you. Please seek the advice of an  if you need this type of document.    **Advance Directives are not required and no one may discriminate against you if you do not sign one.    Medical Orders  Many states allow specific forms/orders signed by your physician to record your wishes for medical treatment in your current state of health. This form, signed in personal communication with your physician, addresses resuscitation and other medical interventions that you may or may not want.      For more information or to schedule a time with a [facility] Advance Care Planning Facilitator contact: [facility] or call [phone] and someone will contact you directly.

## 2020-03-01 LAB
ALBUMIN SERPL-MCNC: 4.4 G/DL (ref 3.8–4.8)
ALBUMIN/GLOB SERPL: 1.8 {RATIO} (ref 1.2–2.2)
ALP SERPL-CCNC: 45 IU/L (ref 39–117)
ALT SERPL-CCNC: 13 IU/L (ref 0–44)
AST SERPL-CCNC: 15 IU/L (ref 0–40)
BASOPHILS # BLD AUTO: 0 X10E3/UL (ref 0–0.2)
BASOPHILS NFR BLD AUTO: 0 %
BILIRUB SERPL-MCNC: 0.5 MG/DL (ref 0–1.2)
BUN SERPL-MCNC: 21 MG/DL (ref 8–27)
BUN/CREAT SERPL: 20 (ref 10–24)
CALCIUM SERPL-MCNC: 9.1 MG/DL (ref 8.6–10.2)
CHLORIDE SERPL-SCNC: 106 MMOL/L (ref 96–106)
CHOLEST SERPL-MCNC: 104 MG/DL (ref 100–199)
CO2 SERPL-SCNC: 26 MMOL/L (ref 20–29)
CREAT SERPL-MCNC: 1.07 MG/DL (ref 0.76–1.27)
EOSINOPHIL # BLD AUTO: 0.5 X10E3/UL (ref 0–0.4)
EOSINOPHIL NFR BLD AUTO: 6 %
ERYTHROCYTE [DISTWIDTH] IN BLOOD BY AUTOMATED COUNT: 13.6 % (ref 11.6–15.4)
GLOBULIN SER CALC-MCNC: 2.4 G/DL (ref 1.5–4.5)
GLUCOSE SERPL-MCNC: 86 MG/DL (ref 65–99)
HCT VFR BLD AUTO: 47 % (ref 37.5–51)
HDLC SERPL-MCNC: 37 MG/DL
HGB BLD-MCNC: 15.3 G/DL (ref 13–17.7)
IMM GRANULOCYTES # BLD AUTO: 0 X10E3/UL (ref 0–0.1)
IMM GRANULOCYTES NFR BLD AUTO: 0 %
LDLC SERPL CALC-MCNC: 52 MG/DL (ref 0–99)
LYMPHOCYTES # BLD AUTO: 2.4 X10E3/UL (ref 0.7–3.1)
LYMPHOCYTES NFR BLD AUTO: 28 %
MCH RBC QN AUTO: 29.3 PG (ref 26.6–33)
MCHC RBC AUTO-ENTMCNC: 32.6 G/DL (ref 31.5–35.7)
MCV RBC AUTO: 90 FL (ref 79–97)
MONOCYTES # BLD AUTO: 0.8 X10E3/UL (ref 0.1–0.9)
MONOCYTES NFR BLD AUTO: 9 %
NEUTROPHILS # BLD AUTO: 5 X10E3/UL (ref 1.4–7)
NEUTROPHILS NFR BLD AUTO: 57 %
PLATELET # BLD AUTO: 221 X10E3/UL (ref 150–450)
POTASSIUM SERPL-SCNC: 5.4 MMOL/L (ref 3.5–5.2)
PROT SERPL-MCNC: 6.8 G/DL (ref 6–8.5)
PSA SERPL-MCNC: 0.6 NG/ML (ref 0–4)
RBC # BLD AUTO: 5.23 X10E6/UL (ref 4.14–5.8)
SODIUM SERPL-SCNC: 144 MMOL/L (ref 134–144)
TRIGL SERPL-MCNC: 77 MG/DL (ref 0–149)
TSH SERPL DL<=0.005 MIU/L-ACNC: 2.78 UIU/ML (ref 0.45–4.5)
VLDLC SERPL CALC-MCNC: 15 MG/DL (ref 5–40)
WBC # BLD AUTO: 8.7 X10E3/UL (ref 3.4–10.8)

## 2020-03-03 ENCOUNTER — TELEPHONE (OUTPATIENT)
Dept: FAMILY MEDICINE CLINIC | Facility: CLINIC | Age: 70
End: 2020-03-03

## 2020-03-03 NOTE — TELEPHONE ENCOUNTER
SPOKE WITH PT ABOUT RECENT LAB RESULTS. PT VOICED UNDERSTANDING. ADVISED PT TO CALL BACK WITH ANY QUESTIONS.

## 2020-03-03 NOTE — TELEPHONE ENCOUNTER
----- Message from Rojelio Gould MD sent at 3/2/2020  6:32 PM EST -----  Please let the patient know that labs that were drawn at previous visit were unremarkable. Patient should continue current treatment plan.  If patient has any questions they can contact me.

## 2020-08-28 ENCOUNTER — OFFICE VISIT (OUTPATIENT)
Dept: FAMILY MEDICINE CLINIC | Facility: CLINIC | Age: 70
End: 2020-08-28

## 2020-08-28 VITALS
BODY MASS INDEX: 28.64 KG/M2 | TEMPERATURE: 97.5 F | DIASTOLIC BLOOD PRESSURE: 68 MMHG | WEIGHT: 193.4 LBS | OXYGEN SATURATION: 96 % | SYSTOLIC BLOOD PRESSURE: 110 MMHG | HEIGHT: 69 IN | HEART RATE: 71 BPM | RESPIRATION RATE: 14 BRPM

## 2020-08-28 DIAGNOSIS — E11.65 TYPE 2 DIABETES MELLITUS WITH HYPERGLYCEMIA, WITH LONG-TERM CURRENT USE OF INSULIN (HCC): ICD-10-CM

## 2020-08-28 DIAGNOSIS — E78.2 MIXED HYPERLIPIDEMIA: Primary | ICD-10-CM

## 2020-08-28 DIAGNOSIS — Z79.4 TYPE 2 DIABETES MELLITUS WITH HYPERGLYCEMIA, WITH LONG-TERM CURRENT USE OF INSULIN (HCC): ICD-10-CM

## 2020-08-28 DIAGNOSIS — I10 ESSENTIAL HYPERTENSION: ICD-10-CM

## 2020-08-28 PROCEDURE — 99214 OFFICE O/P EST MOD 30 MIN: CPT | Performed by: FAMILY MEDICINE

## 2020-08-28 NOTE — PROGRESS NOTES
Kvng Li is a 70 y.o. male who presents today for Hypertension (6 mo fu, working on diet and exercising); Hyperlipidemia; and Diabetes      Hypertension   This is a chronic problem. The current episode started more than 1 year ago. The problem has been gradually improving since onset. The problem is controlled. Pertinent negatives include no anxiety, blurred vision, chest pain, headaches, malaise/fatigue, neck pain, orthopnea, palpitations, peripheral edema, shortness of breath or sweats. Risk factors for coronary artery disease include dyslipidemia, male gender and diabetes mellitus. Past treatments include ACE inhibitors. Current antihypertension treatment includes ACE inhibitors. The current treatment provides significant improvement. There are no compliance problems.  There is no history of chronic renal disease.   Hyperlipidemia   This is a chronic problem. The current episode started more than 1 year ago. The problem is controlled. Exacerbating diseases include diabetes and hypothyroidism. He has no history of chronic renal disease, liver disease, obesity or nephrotic syndrome. Pertinent negatives include no chest pain, focal sensory loss, focal weakness, leg pain, myalgias or shortness of breath. Current antihyperlipidemic treatment includes diet change and statins. There are no compliance problems.    Diabetes   He presents for his follow-up diabetic visit. He has type 2 diabetes mellitus. His disease course has been improving. There are no hypoglycemic associated symptoms. Pertinent negatives for hypoglycemia include no headaches, seizures or sweats. Pertinent negatives for diabetes include no blurred vision, no chest pain, no fatigue, no foot paresthesias, no foot ulcerations, no polydipsia, no polyphagia, no polyuria, no visual change, no weakness and no weight loss. There are no hypoglycemic complications. Symptoms are stable. There are no diabetic complications. Risk factors for coronary artery  disease include diabetes mellitus, dyslipidemia, hypertension and male sex. Current diabetic treatment includes diet, insulin injections and oral agent (dual therapy). He is compliant with treatment all of the time. His weight is decreasing steadily. He is following a diabetic and generally healthy diet. His home blood glucose trend is decreasing steadily. An ACE inhibitor/angiotensin II receptor blocker is being taken. He does not see a podiatrist.Eye exam is current.        Review of Systems   Constitutional: Negative for fatigue, fever, malaise/fatigue and unexpected weight loss.   HENT: Negative for congestion, ear pain and sore throat.    Eyes: Negative for blurred vision and visual disturbance.   Respiratory: Negative for cough, shortness of breath and wheezing.    Cardiovascular: Negative for chest pain, palpitations and orthopnea.   Gastrointestinal: Negative for abdominal pain, blood in stool, constipation, diarrhea, nausea, vomiting and GERD.   Endocrine: Negative for polydipsia, polyphagia and polyuria.   Genitourinary: Negative for difficulty urinating.   Musculoskeletal: Negative for joint swelling, myalgias and neck pain.   Skin: Negative for rash and skin lesions.   Allergic/Immunologic: Negative for environmental allergies.   Neurological: Negative for focal weakness, seizures, syncope and weakness.   Hematological: Does not bruise/bleed easily.   Psychiatric/Behavioral: Negative for suicidal ideas.        The following portions of the patient's history were reviewed and updated as appropriate: allergies, current medications, past family history, past medical history, past social history, past surgical history and problem list.    Current Outpatient Medications on File Prior to Visit   Medication Sig Dispense Refill   • amLODIPine (NORVASC) 10 MG tablet Take 1 tablet by mouth Daily. 90 tablet 3   • B-D UF III MINI PEN NEEDLES 31G X 5 MM misc   4   • Empagliflozin (JARDIANCE) 25 MG tablet Take  by  "mouth Daily.     • escitalopram (LEXAPRO) 10 MG tablet Take 1 tablet by mouth Daily. 90 tablet 3   • Insulin Glargine (TOUJEO SOLOSTAR) 300 UNIT/ML solution pen-injector Inject 10 Units under the skin into the appropriate area as directed Every Night.     • lisinopril (PRINIVIL,ZESTRIL) 20 MG tablet Take 1 tablet by mouth Daily. 90 tablet 3   • metFORMIN ER (GLUCOPHAGE-XR) 500 MG 24 hr tablet 1,500 mg.  4   • rosuvastatin (CRESTOR) 20 MG tablet Take 1 tablet by mouth Every Night. 90 tablet 3     No current facility-administered medications on file prior to visit.        No Known Allergies     Visit Vitals  /68   Pulse 71   Temp 97.5 °F (36.4 °C) (Temporal)   Resp 14   Ht 175.3 cm (69\")   Wt 87.7 kg (193 lb 6.4 oz)   SpO2 96%   BMI 28.56 kg/m²        Physical Exam   Constitutional: He is oriented to person, place, and time. He appears well-developed and well-nourished. No distress.   HENT:   Head: Atraumatic.   Eyes: EOM are normal.   Neck: Normal range of motion. Neck supple.   Cardiovascular: Normal rate, regular rhythm, normal heart sounds and intact distal pulses. Exam reveals no gallop and no friction rub.   No murmur heard.  Pulmonary/Chest: Effort normal and breath sounds normal. No respiratory distress. He has no wheezes. He has no rales.   Abdominal: Soft. Bowel sounds are normal. He exhibits no distension. There is no tenderness.   Musculoskeletal: He exhibits no edema.   Neurological: He is alert and oriented to person, place, and time.   Skin: Skin is warm and dry. He is not diaphoretic.   Psychiatric: He has a normal mood and affect. His behavior is normal.             Problems Addressed this Visit        Cardiovascular and Mediastinum    HTN (hypertension)     Hypertension is improving with treatment.  Continue current treatment regimen.  Blood pressure will be reassessed at the next regular appointment.         Relevant Orders    CBC & Differential    Comprehensive Metabolic Panel    HLD " (hyperlipidemia) - Primary     Lipid abnormalities are unchanged.  Pharmacotherapy as ordered.  Lipids will be reassessed in 6 months.         Relevant Orders    CBC & Differential    Comprehensive Metabolic Panel       Endocrine    DM2 (diabetes mellitus, type 2) (CMS/[facility])     Diabetes is improving with treatment.   Continue current treatment regimen.  Diabetes will be reassessed in 6 months.         Relevant Orders    CBC & Differential    Comprehensive Metabolic Panel    Hemoglobin A1c          Return in about 6 months (around 2/28/2021) for Medicare Wellness.    Parts of this office note have been dictated by voice recognition software. Grammatical and/or spelling errors may be present.    Rojelio Gould MD   8/28/2020

## 2020-08-29 LAB
ALBUMIN SERPL-MCNC: 4.4 G/DL (ref 3.5–5.2)
ALBUMIN/GLOB SERPL: 2.4 G/DL
ALP SERPL-CCNC: 38 U/L (ref 39–117)
ALT SERPL-CCNC: 14 U/L (ref 1–41)
AST SERPL-CCNC: 21 U/L (ref 1–40)
BASOPHILS # BLD AUTO: 0.02 10*3/MM3 (ref 0–0.2)
BASOPHILS NFR BLD AUTO: 0.3 % (ref 0–1.5)
BILIRUB SERPL-MCNC: 0.4 MG/DL (ref 0–1.2)
BUN SERPL-MCNC: 21 MG/DL (ref 8–23)
BUN/CREAT SERPL: 23.6 (ref 7–25)
CALCIUM SERPL-MCNC: 8.9 MG/DL (ref 8.6–10.5)
CHLORIDE SERPL-SCNC: 105 MMOL/L (ref 98–107)
CO2 SERPL-SCNC: 25.1 MMOL/L (ref 22–29)
CREAT SERPL-MCNC: 0.89 MG/DL (ref 0.76–1.27)
EOSINOPHIL # BLD AUTO: 0.62 10*3/MM3 (ref 0–0.4)
EOSINOPHIL NFR BLD AUTO: 8.3 % (ref 0.3–6.2)
ERYTHROCYTE [DISTWIDTH] IN BLOOD BY AUTOMATED COUNT: 13 % (ref 12.3–15.4)
GLOBULIN SER CALC-MCNC: 1.8 GM/DL
GLUCOSE SERPL-MCNC: 70 MG/DL (ref 65–99)
HBA1C MFR BLD: 6.3 % (ref 4.8–5.6)
HCT VFR BLD AUTO: 43.5 % (ref 37.5–51)
HGB BLD-MCNC: 14.7 G/DL (ref 13–17.7)
IMM GRANULOCYTES # BLD AUTO: 0.02 10*3/MM3 (ref 0–0.05)
IMM GRANULOCYTES NFR BLD AUTO: 0.3 % (ref 0–0.5)
LYMPHOCYTES # BLD AUTO: 2.02 10*3/MM3 (ref 0.7–3.1)
LYMPHOCYTES NFR BLD AUTO: 26.9 % (ref 19.6–45.3)
MCH RBC QN AUTO: 30 PG (ref 26.6–33)
MCHC RBC AUTO-ENTMCNC: 33.8 G/DL (ref 31.5–35.7)
MCV RBC AUTO: 88.8 FL (ref 79–97)
MONOCYTES # BLD AUTO: 0.57 10*3/MM3 (ref 0.1–0.9)
MONOCYTES NFR BLD AUTO: 7.6 % (ref 5–12)
NEUTROPHILS # BLD AUTO: 4.26 10*3/MM3 (ref 1.7–7)
NEUTROPHILS NFR BLD AUTO: 56.6 % (ref 42.7–76)
NRBC BLD AUTO-RTO: 0.1 /100 WBC (ref 0–0.2)
PLATELET # BLD AUTO: 205 10*3/MM3 (ref 140–450)
POTASSIUM SERPL-SCNC: 4.2 MMOL/L (ref 3.5–5.2)
PROT SERPL-MCNC: 6.2 G/DL (ref 6–8.5)
RBC # BLD AUTO: 4.9 10*6/MM3 (ref 4.14–5.8)
SODIUM SERPL-SCNC: 140 MMOL/L (ref 136–145)
WBC # BLD AUTO: 7.51 10*3/MM3 (ref 3.4–10.8)

## 2020-10-01 ENCOUNTER — OFFICE VISIT (OUTPATIENT)
Dept: FAMILY MEDICINE CLINIC | Facility: CLINIC | Age: 70
End: 2020-10-01

## 2020-10-01 VITALS
TEMPERATURE: 98.4 F | HEART RATE: 78 BPM | HEIGHT: 69 IN | RESPIRATION RATE: 16 BRPM | BODY MASS INDEX: 28.35 KG/M2 | OXYGEN SATURATION: 99 % | WEIGHT: 191.4 LBS | SYSTOLIC BLOOD PRESSURE: 110 MMHG | DIASTOLIC BLOOD PRESSURE: 77 MMHG

## 2020-10-01 DIAGNOSIS — Z23 NEED FOR TD VACCINE: ICD-10-CM

## 2020-10-01 DIAGNOSIS — L08.9: Primary | ICD-10-CM

## 2020-10-01 DIAGNOSIS — Z23 NEED FOR IMMUNIZATION AGAINST INFLUENZA: ICD-10-CM

## 2020-10-01 DIAGNOSIS — S80.812S: Primary | ICD-10-CM

## 2020-10-01 PROCEDURE — 90715 TDAP VACCINE 7 YRS/> IM: CPT | Performed by: NURSE PRACTITIONER

## 2020-10-01 PROCEDURE — 90694 VACC AIIV4 NO PRSRV 0.5ML IM: CPT | Performed by: NURSE PRACTITIONER

## 2020-10-01 PROCEDURE — 90471 IMMUNIZATION ADMIN: CPT | Performed by: NURSE PRACTITIONER

## 2020-10-01 PROCEDURE — 99213 OFFICE O/P EST LOW 20 MIN: CPT | Performed by: NURSE PRACTITIONER

## 2020-10-01 PROCEDURE — G0008 ADMIN INFLUENZA VIRUS VAC: HCPCS | Performed by: NURSE PRACTITIONER

## 2020-10-01 RX ORDER — SULFAMETHOXAZOLE AND TRIMETHOPRIM 800; 160 MG/1; MG/1
1 TABLET ORAL 2 TIMES DAILY
Qty: 20 TABLET | Refills: 0 | Status: SHIPPED | OUTPATIENT
Start: 2020-10-01 | End: 2021-02-16

## 2020-10-01 NOTE — PATIENT INSTRUCTIONS
Abrasion    An abrasion is a cut or a scrape on the surface of your skin. An abrasion does not go through all the layers of your skin. It is important to take good care of your abrasion to prevent infection.  Follow these instructions at home:  Medicines  · Take or apply over-the-counter and prescription medicines only as told by your doctor.  · If you were prescribed an antibiotic medicine, apply it as told by your doctor. Do not stop using the antibiotic even if you start to feel better.  Wound care  · Clean the wound 2-3 times a day or as often as told by your doctor. To do this:  ? Wash the wound with mild soap and water.  ? Rinse off the soap.  ? Pat a clean towel on the wound to dry it. Do not rub it.  · Keep the bandage (dressing) clean and dry as told by your doctor.  · Follow instructions from your doctor about how to take care of your wound. Make sure you:  ? Wash your hands with soap and water before you change your bandage. If you cannot use soap and water, use hand .  ? Change your bandage as told by your doctor.  · Check your wound every day for signs of infection. Check for:  ? Redness, swelling, or pain.  ? Fluid or blood.  ? Warmth.  ? Pus or a bad smell.  · If directed, put ice on the injured area. To do this:  ? Put ice in a plastic bag.  ? Place a towel between your skin and the bag.  ? Leave the ice on for 20 minutes, 2-3 times a day.  General instructions  · Do not take baths, swim, or use a hot tub until your doctor says it is okay.  · If there is swelling, raise (elevate) the injured area above the level of your heart while you are sitting or lying down.  · Keep all follow-up visits as told by your doctor. This is important.  Contact a doctor if:  · You were given a tetanus shot, and you have any of these where the needle went in:  ? Swelling.  ? Very bad pain.  ? Redness.  ? Bleeding.  · You have a lot of pain, and medicine does not help.  · You have any of these at the site of the  "wound:  ? More redness.  ? More swelling.  ? More pain.  Get help right away if:  · You have a red streak going away from your wound.  · You have a fever.  · You have fluid, blood, or pus coming from your wound.  · There is a bad smell coming from your wound or bandage.  Summary  · An abrasion is a cut or a scrape on the surface of your skin.  · Take good care of your abrasion so it does not get infected.  · Clean the wound with mild soap and water, and change your bandage as told by your doctor.  · Call your doctor if you have redness, swelling, or more pain in your wound.  · Get help right away if you have a fever or if you have fluid, blood, pus, a bad smell, or a red streak coming from the wound.  This information is not intended to replace advice given to you by your health care provider. Make sure you discuss any questions you have with your health care provider.  Document Released: 06/05/2009 Document Revised: 11/30/2018 Document Reviewed: 08/09/2018  [employer] Patient Education © 2020 [employer]  https://www.cdc.gov/vaccines/hcp/vis/vis-statements/tdap.pdf\">   Tdap (Tetanus, Diphtheria, Pertussis) Vaccine: What You Need to Know  1. Why get vaccinated?  Tdap vaccine can prevent tetanus, diphtheria, and pertussis.  Diphtheria and pertussis spread from person to person. Tetanus enters the body through cuts or wounds.  · TETANUS (T) causes painful stiffening of the muscles. Tetanus can lead to serious health problems, including being unable to open the mouth, having trouble swallowing and breathing, or death.  · DIPHTHERIA (D) can lead to difficulty breathing, heart failure, paralysis, or death.  · PERTUSSIS (aP), also known as \"whooping cough,\" can cause uncontrollable, violent coughing which makes it hard to breathe, eat, or drink. Pertussis can be extremely serious in babies and young children, causing pneumonia, convulsions, brain damage, or death. In teens and adults, it can cause weight loss, loss of " bladder control, passing out, and rib fractures from severe coughing.  2. Tdap vaccine  Tdap is only for children 7 years and older, adolescents, and adults.   Adolescents should receive a single dose of Tdap, preferably at age 11 or 12 years.  Pregnant women should get a dose of Tdap during every pregnancy, to protect the  from pertussis. Infants are most at risk for severe, life-threatening complications from pertussis.  Adults who have never received Tdap should get a dose of Tdap.  Also, adults should receive a booster dose every 10 years, or earlier in the case of a severe and dirty wound or burn. Booster doses can be either Tdap or Td (a different vaccine that protects against tetanus and diphtheria but not pertussis).  Tdap may be given at the same time as other vaccines.  3. Talk with your health care provider  Tell your vaccine provider if the person getting the vaccine:  · Has had an allergic reaction after a previous dose of any vaccine that protects against tetanus, diphtheria, or pertussis, or has any severe, life-threatening allergies.  · Has had a coma, decreased level of consciousness, or prolonged seizures within 7 days after a previous dose of any pertussis vaccine (DTP, DTaP, or Tdap).  · Has seizures or another nervous system problem.  · Has ever had Guillain-Barré Syndrome (also called GBS).  · Has had severe pain or swelling after a previous dose of any vaccine that protects against tetanus or diphtheria.  In some cases, your health care provider may decide to postpone Tdap vaccination to a future visit.   People with minor illnesses, such as a cold, may be vaccinated. People who are moderately or severely ill should usually wait until they recover before getting Tdap vaccine.   Your health care provider can give you more information.  4. Risks of a vaccine reaction  · Pain, redness, or swelling where the shot was given, mild fever, headache, feeling tired, and nausea, vomiting,  diarrhea, or stomachache sometimes happen after Tdap vaccine.  People sometimes faint after medical procedures, including vaccination. Tell your provider if you feel dizzy or have vision changes or ringing in the ears.   As with any medicine, there is a very remote chance of a vaccine causing a severe allergic reaction, other serious injury, or death.  5. What if there is a serious problem?  An allergic reaction could occur after the vaccinated person leaves the clinic. If you see signs of a severe allergic reaction (hives, swelling of the face and throat, difficulty breathing, a fast heartbeat, dizziness, or weakness), call 9-1-1 and get the person to the nearest hospital.  For other signs that concern you, call your health care provider.   Adverse reactions should be reported to the Vaccine Adverse Event Reporting System (VAERS). Your health care provider will usually file this report, or you can do it yourself. Visit the VAERS website at www.vaers.hhs.gov or call 1-778.303.9302. VAERS is only for reporting reactions, and VAERS staff do not give medical advice.  6. The National Vaccine Injury Compensation Program  The National Vaccine Injury Compensation Program (VICP) is a federal program that was created to compensate people who may have been injured by certain vaccines. Visit the VICP website at www.hrsa.gov/vaccinecompensation or call 1-893.867.6344 to learn about the program and about filing a claim. There is a time limit to file a claim for compensation.  7. How can I learn more?  · Ask your health care provider.  · Call your local or state health department.  · Contact the Centers for Disease Control and Prevention (CDC):  ? Call 1-602.734.2340 (1-602-KHT-INFO) or  ? Visit CDC's website at www.cdc.gov/vaccines  Vaccine Information Statement Tdap (Tetanus, Diphtheria, Pertussis) Vaccine (04/01/2020)  This information is not intended to replace advice given to you by your health care provider. Make sure you  discuss any questions you have with your health care provider.  Document Released: 06/18/2013 Document Revised: 04/10/2020 Document Reviewed: 04/13/2020  [employer] Patient Education © 2020 [employer]  Influenza (Flu) Vaccine (Inactivated or Recombinant): What You Need to Know  1. Why get vaccinated?  Influenza vaccine can prevent influenza (flu).  Flu is a contagious disease that spreads around the [place] every year, usually between October and May. Anyone can get the flu, but it is more dangerous for some people. Infants and young children, people 65 years of age and older, pregnant women, and people with certain health conditions or a weakened immune system are at greatest risk of flu complications.  Pneumonia, bronchitis, sinus infections and ear infections are examples of flu-related complications. If you have a medical condition, such as heart disease, cancer or diabetes, flu can make it worse.  Flu can cause fever and chills, sore throat, muscle aches, fatigue, cough, headache, and runny or stuffy nose. Some people may have vomiting and diarrhea, though this is more common in children than adults.  Each year thousands of people in the [place] die from flu, and many more are hospitalized. Flu vaccine prevents millions of illnesses and flu-related visits to the doctor each year.  2. Influenza vaccine  CDC recommends everyone 6 months of age and older get vaccinated every flu season. Children 6 months through 8 years of age may need 2 doses during a single flu season. Everyone else needs only 1 dose each flu season.  It takes about 2 weeks for protection to develop after vaccination.  There are many flu viruses, and they are always changing. Each year a new flu vaccine is made to protect against three or four viruses that are likely to cause disease in the upcoming flu season. Even when the vaccine doesn't exactly match these viruses, it may still provide some protection.  Influenza vaccine does  not cause flu.  Influenza vaccine may be given at the same time as other vaccines.  3. Talk with your health care provider  Tell your vaccine provider if the person getting the vaccine:  · Has had an allergic reaction after a previous dose of influenza vaccine, or has any severe, life-threatening allergies.  · Has ever had Guillain-Barré Syndrome (also called GBS).  In some cases, your health care provider may decide to postpone influenza vaccination to a future visit.  People with minor illnesses, such as a cold, may be vaccinated. People who are moderately or severely ill should usually wait until they recover before getting influenza vaccine.  Your health care provider can give you more information.  4. Risks of a vaccine reaction  · Soreness, redness, and swelling where shot is given, fever, muscle aches, and headache can happen after influenza vaccine.  · There may be a very small increased risk of Guillain-Barré Syndrome (GBS) after inactivated influenza vaccine (the flu shot).  Young children who get the flu shot along with pneumococcal vaccine (PCV13), and/or DTaP vaccine at the same time might be slightly more likely to have a seizure caused by fever. Tell your health care provider if a child who is getting flu vaccine has ever had a seizure.  People sometimes faint after medical procedures, including vaccination. Tell your provider if you feel dizzy or have vision changes or ringing in the ears.  As with any medicine, there is a very remote chance of a vaccine causing a severe allergic reaction, other serious injury, or death.  5. What if there is a serious problem?  An allergic reaction could occur after the vaccinated person leaves the clinic. If you see signs of a severe allergic reaction (hives, swelling of the face and throat, difficulty breathing, a fast heartbeat, dizziness, or weakness), call 9-1-1 and get the person to the nearest hospital.  For other signs that concern you, call your health care  provider.  Adverse reactions should be reported to the Vaccine Adverse Event Reporting System (VAERS). Your health care provider will usually file this report, or you can do it yourself. Visit the VAERS website at www.vaers.[facility].gov or call 1-563.279.5961.VAERS is only for reporting reactions, and VAERS staff do not give medical advice.  6. The National Vaccine Injury Compensation Program  The National Vaccine Injury Compensation Program (VICP) is a federal program that was created to compensate people who may have been injured by certain vaccines. Visit the VICP website at www.[facility].gov/vaccinecompensation or call 1-833.971.1612 to learn about the program and about filing a claim. There is a time limit to file a claim for compensation.  7. How can I learn more?  · Ask your healthcare provider.  · Call your local or state health department.  · Contact the Centers for Disease Control and Prevention (CDC):  ? Call 1-128.703.9130 (3-934-CGX-INFO) or  ? Visit CDC's www.cdc.gov/flu  Vaccine Information Statement (Interim) Inactivated Influenza Vaccine (8/15/2019)  This information is not intended to replace advice given to you by your health care provider. Make sure you discuss any questions you have with your health care provider.  Document Released: 10/12/2007 Document Revised: 04/07/2020 Document Reviewed: 08/19/2019  Elsevier Patient Education © 2020 [employer]

## 2020-10-01 NOTE — PROGRESS NOTES
Subjective   Kvng Li is a 70 y.o. male.   Chief Complaint   Patient presents with   • Immunizations   • leg wound      History of Present Illness   Patient is here an infected abrasion to his left lower leg.   He was moving a field and ran the mower into a plank fence and the plank skinned up lower leg. Happened on Saturday 26th, 2020.   He cleaned it and has left open to air. Yesterday it stuck to his sock and it opened up.   He has had small amount of serosanguinous drainage. No odor. No fever, no chills.   Type 2 dm recently stopped insulin and on oral meds. BG has been under control   He is needing a tetanus vaccine.   Discussed influenza vaccine.       The following portions of the patient's history were reviewed and updated as appropriate: allergies, current medications, past family history, past medical history, past social history, past surgical history and problem list.    Review of Systems   Constitutional: Negative.    HENT: Negative.    Respiratory: Negative.    Cardiovascular: Negative.    Gastrointestinal: Negative.    Musculoskeletal: Positive for myalgias.   Skin: Positive for color change and wound.   Allergic/Immunologic: Negative.    Neurological: Negative for dizziness.   Hematological: Negative.    Psychiatric/Behavioral: Negative.      Past Surgical History:   Procedure Laterality Date   • COLONOSCOPY  2011   • KNEE ARTHROSCOPY Right 2013   • REPLACEMENT TOTAL KNEE Right 2015   • UPPER GASTROINTESTINAL ENDOSCOPY  2015     Past Medical History:   Diagnosis Date   • DM2 (diabetes mellitus, type 2) (CMS/Formerly [facility])    • ED (erectile dysfunction)    • JEFE (generalized anxiety disorder)    • HLD (hyperlipidemia)    • HTN (hypertension)    • OA (osteoarthritis) of knee    • Obesity    • Right inguinal hernia        Objective   No Known Allergies  Visit Vitals  /77 (BP Location: Right arm, Patient Position: Sitting, Cuff Size: Adult)   Pulse 78   Temp 98.4 °F (36.9 °C) (Temporal)   Resp 16   Ht  "175.3 cm (69\")   Wt 86.8 kg (191 lb 6.4 oz)   SpO2 99%   BMI 28.26 kg/m²       Physical Exam  Vitals signs reviewed.   Constitutional:       Appearance: Normal appearance. He is well-developed, well-groomed and normal weight.   HENT:      Head: Normocephalic.   Cardiovascular:      Rate and Rhythm: Normal rate and regular rhythm.      Pulses: Normal pulses.      Heart sounds: Normal heart sounds.   Pulmonary:      Effort: Pulmonary effort is normal.      Breath sounds: Normal breath sounds.   Skin:     General: Skin is warm.      Capillary Refill: Capillary refill takes less than 2 seconds.      Findings: Abrasion and erythema present. No lesion.             Comments: Abrasion left lower leg.   2.6 cm x 2.0 cm   Erythematous around the edges, slight yellow fluid clear.      Neurological:      Mental Status: He is alert and oriented to person, place, and time.   Psychiatric:         Mood and Affect: Mood normal.         Behavior: Behavior normal.         Thought Content: Thought content normal.         Assessment/Plan   Kvng was seen today for immunizations and leg wound.    Diagnoses and all orders for this visit:    Infected abrasion of left leg, sequela  -     Discontinue: mupirocin (BACTROBAN) 2 % ointment  -     sulfamethoxazole-trimethoprim (Bactrim DS) 800-160 MG per tablet; Take 1 tablet by mouth 2 (Two) Times a Day.  -     mupirocin (Bactroban) 2 % ointment; Apply  topically to the appropriate area as directed 2 (Two) Times a Day for 10 days.  -     Td Vaccine Greater Than or Equal To 8yo With Preservative IM    Need for Td vaccine    Need for immunization against influenza  -     Fluad Quad 65+ yrs (6922-6906)      See abrasion, Tdap and influenza VIS sheets.   Follow up as needed.              Patient Instructions   Abrasion    An abrasion is a cut or a scrape on the surface of your skin. An abrasion does not go through all the layers of your skin. It is important to take good care of your abrasion to " prevent infection.  Follow these instructions at home:  Medicines  · Take or apply over-the-counter and prescription medicines only as told by your doctor.  · If you were prescribed an antibiotic medicine, apply it as told by your doctor. Do not stop using the antibiotic even if you start to feel better.  Wound care  · Clean the wound 2-3 times a day or as often as told by your doctor. To do this:  ? Wash the wound with mild soap and water.  ? Rinse off the soap.  ? Pat a clean towel on the wound to dry it. Do not rub it.  · Keep the bandage (dressing) clean and dry as told by your doctor.  · Follow instructions from your doctor about how to take care of your wound. Make sure you:  ? Wash your hands with soap and water before you change your bandage. If you cannot use soap and water, use hand .  ? Change your bandage as told by your doctor.  · Check your wound every day for signs of infection. Check for:  ? Redness, swelling, or pain.  ? Fluid or blood.  ? Warmth.  ? Pus or a bad smell.  · If directed, put ice on the injured area. To do this:  ? Put ice in a plastic bag.  ? Place a towel between your skin and the bag.  ? Leave the ice on for 20 minutes, 2-3 times a day.  General instructions  · Do not take baths, swim, or use a hot tub until your doctor says it is okay.  · If there is swelling, raise (elevate) the injured area above the level of your heart while you are sitting or lying down.  · Keep all follow-up visits as told by your doctor. This is important.  Contact a doctor if:  · You were given a tetanus shot, and you have any of these where the needle went in:  ? Swelling.  ? Very bad pain.  ? Redness.  ? Bleeding.  · You have a lot of pain, and medicine does not help.  · You have any of these at the site of the wound:  ? More redness.  ? More swelling.  ? More pain.  Get help right away if:  · You have a red streak going away from your wound.  · You have a fever.  · You have fluid, blood, or pus  "coming from your wound.  · There is a bad smell coming from your wound or bandage.  Summary  · An abrasion is a cut or a scrape on the surface of your skin.  · Take good care of your abrasion so it does not get infected.  · Clean the wound with mild soap and water, and change your bandage as told by your doctor.  · Call your doctor if you have redness, swelling, or more pain in your wound.  · Get help right away if you have a fever or if you have fluid, blood, pus, a bad smell, or a red streak coming from the wound.  This information is not intended to replace advice given to you by your health care provider. Make sure you discuss any questions you have with your health care provider.  Document Released: 06/05/2009 Document Revised: 11/30/2018 Document Reviewed: 08/09/2018  [employer] Patient Education © 2020 [employer]  https://www.cdc.gov/vaccines/hcp/vis/vis-statements/tdap.pdf\">   Tdap (Tetanus, Diphtheria, Pertussis) Vaccine: What You Need to Know  1. Why get vaccinated?  Tdap vaccine can prevent tetanus, diphtheria, and pertussis.  Diphtheria and pertussis spread from person to person. Tetanus enters the body through cuts or wounds.  · TETANUS (T) causes painful stiffening of the muscles. Tetanus can lead to serious health problems, including being unable to open the mouth, having trouble swallowing and breathing, or death.  · DIPHTHERIA (D) can lead to difficulty breathing, heart failure, paralysis, or death.  · PERTUSSIS (aP), also known as \"whooping cough,\" can cause uncontrollable, violent coughing which makes it hard to breathe, eat, or drink. Pertussis can be extremely serious in babies and young children, causing pneumonia, convulsions, brain damage, or death. In teens and adults, it can cause weight loss, loss of bladder control, passing out, and rib fractures from severe coughing.  2. Tdap vaccine  Tdap is only for children 7 years and older, adolescents, and adults.   Adolescents should receive a " single dose of Tdap, preferably at age 11 or 12 years.  Pregnant women should get a dose of Tdap during every pregnancy, to protect the  from pertussis. Infants are most at risk for severe, life-threatening complications from pertussis.  Adults who have never received Tdap should get a dose of Tdap.  Also, adults should receive a booster dose every 10 years, or earlier in the case of a severe and dirty wound or burn. Booster doses can be either Tdap or Td (a different vaccine that protects against tetanus and diphtheria but not pertussis).  Tdap may be given at the same time as other vaccines.  3. Talk with your health care provider  Tell your vaccine provider if the person getting the vaccine:  · Has had an allergic reaction after a previous dose of any vaccine that protects against tetanus, diphtheria, or pertussis, or has any severe, life-threatening allergies.  · Has had a coma, decreased level of consciousness, or prolonged seizures within 7 days after a previous dose of any pertussis vaccine (DTP, DTaP, or Tdap).  · Has seizures or another nervous system problem.  · Has ever had Guillain-Barré Syndrome (also called GBS).  · Has had severe pain or swelling after a previous dose of any vaccine that protects against tetanus or diphtheria.  In some cases, your health care provider may decide to postpone Tdap vaccination to a future visit.   People with minor illnesses, such as a cold, may be vaccinated. People who are moderately or severely ill should usually wait until they recover before getting Tdap vaccine.   Your health care provider can give you more information.  4. Risks of a vaccine reaction  · Pain, redness, or swelling where the shot was given, mild fever, headache, feeling tired, and nausea, vomiting, diarrhea, or stomachache sometimes happen after Tdap vaccine.  People sometimes faint after medical procedures, including vaccination. Tell your provider if you feel dizzy or have vision changes or  ringing in the ears.   As with any medicine, there is a very remote chance of a vaccine causing a severe allergic reaction, other serious injury, or death.  5. What if there is a serious problem?  An allergic reaction could occur after the vaccinated person leaves the clinic. If you see signs of a severe allergic reaction (hives, swelling of the face and throat, difficulty breathing, a fast heartbeat, dizziness, or weakness), call 9-1-1 and get the person to the nearest hospital.  For other signs that concern you, call your health care provider.   Adverse reactions should be reported to the Vaccine Adverse Event Reporting System (VAERS). Your health care provider will usually file this report, or you can do it yourself. Visit the VAERS website at www.vaers.hhs.gov or call 1-530.881.7504. VAERS is only for reporting reactions, and VAERS staff do not give medical advice.  6. The National Vaccine Injury Compensation Program  The National Vaccine Injury Compensation Program (VICP) is a federal program that was created to compensate people who may have been injured by certain vaccines. Visit the VICP website at www.hrsa.gov/vaccinecompensation or call 1-839.133.6006 to learn about the program and about filing a claim. There is a time limit to file a claim for compensation.  7. How can I learn more?  · Ask your health care provider.  · Call your local or state health department.  · Contact the Centers for Disease Control and Prevention (CDC):  ? Call 1-564.676.6390 (5-823-ZXN-INFO) or  ? Visit CDC's website at www.cdc.gov/vaccines  Vaccine Information Statement Tdap (Tetanus, Diphtheria, Pertussis) Vaccine (04/01/2020)  This information is not intended to replace advice given to you by your health care provider. Make sure you discuss any questions you have with your health care provider.  Document Released: 06/18/2013 Document Revised: 04/10/2020 Document Reviewed: 04/13/2020  Elsevier Patient Education © 2020 [employer]        Carina Alas, APRN

## 2020-10-29 ENCOUNTER — TELEPHONE (OUTPATIENT)
Dept: FAMILY MEDICINE CLINIC | Facility: CLINIC | Age: 70
End: 2020-10-29

## 2020-10-29 DIAGNOSIS — E11.65 TYPE 2 DIABETES MELLITUS WITH HYPERGLYCEMIA, WITH LONG-TERM CURRENT USE OF INSULIN (HCC): Primary | ICD-10-CM

## 2020-10-29 DIAGNOSIS — Z79.4 TYPE 2 DIABETES MELLITUS WITH HYPERGLYCEMIA, WITH LONG-TERM CURRENT USE OF INSULIN (HCC): Primary | ICD-10-CM

## 2020-10-29 NOTE — TELEPHONE ENCOUNTER
Patient called and stated he needs prodigy test strips and lancets sent into his pharmacy     [facility] -[phone]    Please call and advise at [phone]

## 2020-10-30 RX ORDER — LANCETS 28 GAUGE
1 EACH MISCELLANEOUS
Qty: 100 EACH | Refills: 12 | Status: SHIPPED | OUTPATIENT
Start: 2020-10-30 | End: 2022-12-12 | Stop reason: SDUPTHER

## 2020-11-30 RX ORDER — METFORMIN HYDROCHLORIDE 500 MG/1
TABLET, EXTENDED RELEASE ORAL
Qty: 120 TABLET | Refills: 1 | Status: SHIPPED | OUTPATIENT
Start: 2020-11-30 | End: 2021-04-09 | Stop reason: SDUPTHER

## 2020-12-07 DIAGNOSIS — E78.01 FAMILIAL HYPERCHOLESTEROLEMIA: ICD-10-CM

## 2020-12-07 RX ORDER — ROSUVASTATIN CALCIUM 20 MG/1
TABLET, COATED ORAL
Qty: 90 TABLET | Refills: 3 | Status: SHIPPED | OUTPATIENT
Start: 2020-12-07 | End: 2021-09-08 | Stop reason: SDUPTHER

## 2021-02-04 RX ORDER — EMPAGLIFLOZIN 25 MG/1
25 TABLET, FILM COATED ORAL DAILY
Qty: 30 TABLET | Refills: 0 | Status: SHIPPED | OUTPATIENT
Start: 2021-02-04 | End: 2021-04-09 | Stop reason: SDUPTHER

## 2021-02-16 ENCOUNTER — TELEMEDICINE (OUTPATIENT)
Dept: FAMILY MEDICINE CLINIC | Facility: CLINIC | Age: 71
End: 2021-02-16

## 2021-02-16 DIAGNOSIS — E11.9 TYPE 2 DIABETES MELLITUS WITHOUT COMPLICATION, WITH LONG-TERM CURRENT USE OF INSULIN (HCC): ICD-10-CM

## 2021-02-16 DIAGNOSIS — Z12.5 ENCOUNTER FOR SCREENING FOR MALIGNANT NEOPLASM OF PROSTATE: ICD-10-CM

## 2021-02-16 DIAGNOSIS — Z00.00 MEDICARE ANNUAL WELLNESS VISIT, SUBSEQUENT: Primary | ICD-10-CM

## 2021-02-16 DIAGNOSIS — Z79.4 TYPE 2 DIABETES MELLITUS WITHOUT COMPLICATION, WITH LONG-TERM CURRENT USE OF INSULIN (HCC): ICD-10-CM

## 2021-02-16 PROCEDURE — 96160 PT-FOCUSED HLTH RISK ASSMT: CPT | Performed by: FAMILY MEDICINE

## 2021-02-16 PROCEDURE — G0439 PPPS, SUBSEQ VISIT: HCPCS | Performed by: FAMILY MEDICINE

## 2021-02-16 PROCEDURE — 1170F FXNL STATUS ASSESSED: CPT | Performed by: FAMILY MEDICINE

## 2021-02-16 PROCEDURE — 1159F MED LIST DOCD IN RCRD: CPT | Performed by: FAMILY MEDICINE

## 2021-02-16 PROCEDURE — 1126F AMNT PAIN NOTED NONE PRSNT: CPT | Performed by: FAMILY MEDICINE

## 2021-02-16 NOTE — PATIENT INSTRUCTIONS
Advance Care Planning and Advance Directives     You make decisions on a daily basis - decisions about where you want to live, your career, your home, your life. Perhaps one of the most important decisions you face is your choice for future medical care. Take time to talk with your family and your healthcare team and start planning today.  Advance Care Planning is a process that can help you:  · Understand possible future healthcare decisions in light of your own experiences  · Reflect on those decision in light of your goals and values  · Discuss your decisions with those closest to you and the healthcare professionals that care for you  · Make a plan by creating a document that reflects your wishes    Surrogate Decision Maker  In the event of a medical emergency, which has left you unable to communicate or to make your own decisions, you would need someone to make decisions for you.  It is important to discuss your preferences for medical treatment with this person while you are in good health.     Qualities of a surrogate decision maker:  • Willing to take on this role and responsibility  • Knows what you want for future medical care  • Willing to follow your wishes even if they don't agree with them  • Able to make difficult medical decisions under stressful circumstances    Advance Directives  These are legal documents you can create that will guide your healthcare team and decision maker(s) when needed. These documents can be stored in the electronic medical record.    · Living Will - a legal document to guide your care if you have a terminal condition or a serious illness and are unable to communicate. States vary by statute in document names/types, but most forms may include one or more of the following:        -  Directions regarding life-prolonging treatments        -  Directions regarding artificially provided nutrition/hydration        -  Choosing a healthcare decision maker        -  Direction  regarding organ/tissue donation    · Durable Power of  for Healthcare - this document names an -in-fact to make medical decisions for you, but it may also allow this person to make personal and financial decisions for you. Please seek the advice of an  if you need this type of document.    **Advance Directives are not required and no one may discriminate against you if you do not sign one.    Medical Orders  Many states allow specific forms/orders signed by your physician to record your wishes for medical treatment in your current state of health. This form, signed in personal communication with your physician, addresses resuscitation and other medical interventions that you may or may not want.      For more information or to schedule a time with a [facility] Advance Care Planning Facilitator contact: [facility]ACP or call [phone] and someone will contact you directly.    Advance Directive    Advance directives are legal documents that let you make choices ahead of time about your health care and medical treatment in case you become unable to communicate for yourself. Advance directives are a way for you to make known your wishes to family, friends, and health care providers. This can let others know about your end-of-life care if you become unable to communicate.  Discussing and writing advance directives should happen over time rather than all at once. Advance directives can be changed depending on your situation and what you want, even after you have signed the advance directives.  There are different types of advance directives, such as:  · Medical power of .  · Living will.  · Do not resuscitate (DNR) or do not attempt resuscitation (DNAR) order.  Health care proxy and medical power of   A health care proxy is also called a health care agent. This is a person who is appointed to make medical decisions for you in cases where you are unable to make the  decisions yourself. Generally, people choose someone they know well and trust to represent their preferences. Make sure to ask this person for an agreement to act as your proxy. A proxy may have to exercise judgment in the event of a medical decision for which your wishes are not known.  A medical power of  is a legal document that names your health care proxy. Depending on the laws in your state, after the document is written, it may also need to be:  · Signed.  · Notarized.  · Dated.  · Copied.  · Witnessed.  · Incorporated into your medical record.  You may also want to appoint someone to manage your money in a situation in which you are unable to do so. This is called a durable power of  for finances. It is a separate legal document from the durable power of  for health care. You may choose the same person or someone different from your health care proxy to act as your agent in money matters.  If you do not appoint a proxy, or if there is a concern that the proxy is not acting in your best interests, a court may appoint a guardian to act on your behalf.  Living will  A living will is a set of instructions that state your wishes about medical care when you cannot express them yourself. Health care providers should keep a copy of your living will in your medical record. You may want to give a copy to family members or friends. To alert caregivers in case of an emergency, you can place a card in your wallet to let them know that you have a living will and where they can find it. A living will is used if you become:  · Terminally ill.  · Disabled.  · Unable to communicate or make decisions.  Items to consider in your living will include:  · To use or not to use life-support equipment, such as dialysis machines and breathing machines (ventilators).  · A DNR or DNAR order. This tells health care providers not to use cardiopulmonary resuscitation (CPR) if breathing or heartbeat stops.  · To use  or not to use tube feeding.  · To be given or not to be given food and fluids.  · Comfort (palliative) care when the goal becomes comfort rather than a cure.  · Donation of organs and tissues.  A living will does not give instructions for distributing your money and property if you should pass away.  DNR or DNAR  A DNR or DNAR order is a request not to have CPR in the event that your heart stops beating or you stop breathing. If a DNR or DNAR order has not been made and shared, a health care provider will try to help any patient whose heart has stopped or who has stopped breathing. If you plan to have surgery, talk with your health care provider about how your DNR or DNAR order will be followed if problems occur.  What if I do not have an advance directive?  If you do not have an advance directive, some states assign family decision makers to act on your behalf based on how closely you are related to them. Each state has its own laws about advance directives. You may want to check with your health care provider, , or state representative about the laws in your state.  Summary  · Advance directives are the legal documents that allow you to make choices ahead of time about your health care and medical treatment in case you become unable to tell others about your care.  · The process of discussing and writing advance directives should happen over time. You can change the advance directives, even after you have signed them.  · Advance directives include DNR or DNAR orders, living gilbert, and designating an agent as your medical power of .  This information is not intended to replace advice given to you by your health care provider. Make sure you discuss any questions you have with your health care provider.  Document Revised: 07/16/2020 Document Reviewed: 07/16/2020  Elsevier Patient Education © 2020 [employer]      Medicare Wellness  Personal Prevention Plan of Service     Date of Office Visit:   2021  Encounter Provider:  Rojelio Gould MD  Place of Service:  [facility]  Patient Name: Kvng Li  :  1950    As part of the Medicare Wellness portion of your visit today, we are providing you with this personalized preventive plan of services (PPPS). This plan is based upon recommendations of the [place] Preventive Services Task Force (USPSTF) and the Advisory Committee on Immunization Practices (ACIP).    This lists the preventive care services that should be considered, and provides dates of when you are due. Items listed as completed are up-to-date and do not require any further intervention.    Health Maintenance   Topic Date Due   • DIABETIC FOOT EXAM  2020   • URINE MICROALBUMIN  2020   • ANNUAL WELLNESS VISIT  2021   • LIPID PANEL  2021   • HEMOGLOBIN A1C  2021   • DIABETIC EYE EXAM  2021   • COLONOSCOPY  2021   • HEPATITIS C SCREENING  Completed   • INFLUENZA VACCINE  Completed   • Pneumococcal Vaccine 65+  Completed   • AAA SCREEN (ONE-TIME)  Completed   • MENINGOCOCCAL VACCINE  Aged Out   • TDAP/TD VACCINES  Discontinued   • ZOSTER VACCINE  Discontinued       Orders Placed This Encounter   Procedures   • Comprehensive Metabolic Panel     Standing Status:   Future     Standing Expiration Date:   2022   • Hemoglobin A1c     Standing Status:   Future     Standing Expiration Date:   2022   • Lipid Panel     Standing Status:   Future     Standing Expiration Date:   2022   • PSA Screen     Standing Status:   Future     Standing Expiration Date:   2022   • TSH Rfx On Abnormal To Free T4     Standing Status:   Future     Standing Expiration Date:   2022   • POC Microalbumin     Standing Status:   Future     Standing Expiration Date:   2022   • CBC & Differential     Standing Status:   Future     Standing Expiration Date:   2022     Order Specific Question:   Manual  Differential     Answer:   No       Return in about 6 months (around 8/16/2021) for Follow-up HTN, DM, HLD.

## 2021-02-16 NOTE — PROGRESS NOTES
The ABCs of the Annual Wellness Visit  Subsequent Medicare Wellness Visit    Chief Complaint   Patient presents with   • Medicare Wellness-subsequent     You have chosen to receive care through a telephone visit. Do you consent to use a telephone visit for your medical care today? yes    Subjective   History of Present Illness:  Kvng Li is a 70 y.o. male who presents for a Subsequent Medicare Wellness Visit.    HEALTH RISK ASSESSMENT    Recent Hospitalizations:  No hospitalization(s) within the last year.    Current Medical Providers:  Patient Care Team:  Rojelio Gould MD as PCP - General (Family Medicine)    Smoking Status:  Social History     Tobacco Use   Smoking Status Never Smoker   Smokeless Tobacco Never Used       Alcohol Consumption:  Social History     Substance and Sexual Activity   Alcohol Use Yes   • Alcohol/week: 5.0 standard drinks   • Types: 5 Cans of beer per week   • Frequency: 4 or more times a week   • Drinks per session: 1 or 2       Depression Screen:   PHQ-2/PHQ-9 Depression Screening 2/16/2021   Little interest or pleasure in doing things 1   Feeling down, depressed, or hopeless 0   Trouble falling or staying asleep, or sleeping too much 0   Feeling tired or having little energy 3   Poor appetite or overeating 0   Feeling bad about yourself - or that you are a failure or have let yourself or your family down 0   Trouble concentrating on things, such as reading the newspaper or watching television 0   Moving or speaking so slowly that other people could have noticed. Or the opposite - being so fidgety or restless that you have been moving around a lot more than usual 0   Thoughts that you would be better off dead, or of hurting yourself in some way 0   Total Score 4   If you checked off any problems, how difficult have these problems made it for you to do your work, take care of things at home, or get along with other people? Not difficult at all       Fall Risk Screen:  MARU  Fall Risk Assessment has not been completed.    Health Habits and Functional and Cognitive Screening:  Functional & Cognitive Status 2/16/2021   Do you have difficulty preparing food and eating? No   Do you have difficulty bathing yourself, getting dressed or grooming yourself? No   Do you have difficulty using the toilet? No   Do you have difficulty moving around from place to place? No   Do you have trouble with steps or getting out of a bed or a chair? No   Current Diet Diabetic Diet   Dental Exam Not up to date        Dental Exam Comment Has delayed due to covid   Eye Exam Up to date   Exercise (times per week) 7 times per week   Current Exercises Include Walking   Current Exercise Activities Include -   Do you need help using the phone?  No   Are you deaf or do you have serious difficulty hearing?  No   Do you need help with transportation? No   Do you need help shopping? No   Do you need help preparing meals?  No   Do you need help with housework?  No   Do you need help with laundry? No   Do you need help taking your medications? No   Do you need help managing money? No   Do you ever drive or ride in a car without wearing a seat belt? No   Have you felt unusual stress, anger or loneliness in the last month? No   Who do you live with? Spouse   If you need help, do you have trouble finding someone available to you? No   Have you been bothered in the last four weeks by sexual problems? Yes   Do you have difficulty concentrating, remembering or making decisions? No         Does the patient have evidence of cognitive impairment? No    Asprin use counseling:Taking ASA appropriately as indicated    Age-appropriate Screening Schedule:  Refer to the list below for future screening recommendations based on patient's age, sex and/or medical conditions. Orders for these recommended tests are listed in the plan section. The patient has been provided with a written plan.    Health Maintenance   Topic Date Due   • DIABETIC  FOOT EXAM  11/18/2020   • URINE MICROALBUMIN  11/18/2020   • LIPID PANEL  02/28/2021   • HEMOGLOBIN A1C  02/28/2021   • DIABETIC EYE EXAM  07/01/2021   • COLONOSCOPY  08/24/2021   • INFLUENZA VACCINE  Completed   • TDAP/TD VACCINES  Discontinued   • ZOSTER VACCINE  Discontinued          The following portions of the patient's history were reviewed and updated as appropriate: allergies, current medications, past family history, past medical history, past social history, past surgical history and problem list.    Outpatient Medications Prior to Visit   Medication Sig Dispense Refill   • amLODIPine (NORVASC) 10 MG tablet Take 1 tablet by mouth Daily. 90 tablet 3   • Empagliflozin (Jardiance) 25 MG tablet Take 25 mg by mouth Daily. NEEDS AN APPT FOR REFILLS 30 tablet 0   • escitalopram (LEXAPRO) 10 MG tablet Take 1 tablet by mouth Daily. 90 tablet 3   • glucose blood test strip 1 each by Other route 4 (Four) Times a Day After Meals & at Bedtime. Use as instructed 100 each 12   • Lancets (freestyle) lancets 1 each by Other route 4 (Four) Times a Day After Meals & at Bedtime. 100 each 12   • lisinopril (PRINIVIL,ZESTRIL) 20 MG tablet Take 1 tablet by mouth Daily. 90 tablet 3   • metFORMIN ER (GLUCOPHAGE-XR) 500 MG 24 hr tablet TAKE ONE TABLET BY MOUTH FOUR TIMES A  tablet 1   • rosuvastatin (CRESTOR) 20 MG tablet 1 TABLET BY MOUTH EVERY NIGHT. 90 tablet 3   • B-D UF III MINI PEN NEEDLES 31G X 5 MM misc   4   • Insulin Glargine (TOUJEO SOLOSTAR) 300 UNIT/ML solution pen-injector Inject 10 Units under the skin into the appropriate area as directed Every Night.     • sulfamethoxazole-trimethoprim (Bactrim DS) 800-160 MG per tablet Take 1 tablet by mouth 2 (Two) Times a Day. 20 tablet 0     No facility-administered medications prior to visit.        Patient Active Problem List   Diagnosis   • HTN (hypertension)   • HLD (hyperlipidemia)   • JEFE (generalized anxiety disorder)   • DM2 (diabetes mellitus, type 2) (CMS/[facility])    • Medicare annual wellness visit, subsequent       Advanced Care Planning:  ACP discussion was held with the patient during this visit. Patient does not have an advance directive, information provided.    Review of Systems   Constitutional: Positive for fatigue. Negative for activity change, appetite change, fever and unexpected weight change.   HENT: Negative for congestion, ear pain and sore throat.    Eyes: Negative for visual disturbance.   Respiratory: Negative for cough, shortness of breath and wheezing.    Cardiovascular: Negative for chest pain, palpitations and leg swelling.   Gastrointestinal: Negative for abdominal pain, blood in stool, constipation, diarrhea, nausea and vomiting.   Endocrine: Negative for polydipsia, polyphagia and polyuria.   Genitourinary: Positive for frequency (feels its 2/2 jardiance). Negative for dysuria and hematuria.   Musculoskeletal: Negative for arthralgias, back pain and myalgias.   Skin: Negative for pallor and rash.   Allergic/Immunologic: Negative for immunocompromised state.   Neurological: Positive for weakness (generalized ). Negative for dizziness, tremors, seizures, syncope, speech difficulty and headaches.   Hematological: Does not bruise/bleed easily.   Psychiatric/Behavioral: Negative for confusion, hallucinations, self-injury and suicidal ideas. The patient is not nervous/anxious.        Compared to one year ago, the patient feels his physical health is worse. Feels like his energy levels are down and he has lost a little strength.   Compared to one year ago, the patient feels his mental health is the same.    Reviewed chart for potential of high risk medication in the elderly: yes  Reviewed chart for potential of harmful drug interactions in the elderly:yes    Objective       There were no vitals filed for this visit.    There is no height or weight on file to calculate BMI.  Discussed the patient's BMI with him. The BMI is in the acceptable range.    Physical  Exam  Constitutional:       General: He is not in acute distress.  Pulmonary:      Effort: Pulmonary effort is normal. No respiratory distress.   Neurological:      General: No focal deficit present.      Mental Status: He is alert. Mental status is at baseline.   Psychiatric:         Mood and Affect: Mood normal.               Assessment/Plan   Medicare Risks and Personalized Health Plan  CMS Preventative Services Quick Reference  Advance Directive Discussion  Cardiovascular risk  Chronic Pain   Colon Cancer Screening  Dementia/Memory   Depression/Dysphoria  Diabetic Lab Screening   Fall Risk  Immunizations Discussed/Encouraged (specific immunizations; COVID 19 vaccine )  Prostate Cancer Screening     The above risks/problems have been discussed with the patient.  Pertinent information has been shared with the patient in the After Visit Summary.  Follow up plans and orders are seen below in the Assessment/Plan Section.    Diagnoses and all orders for this visit:    1. Medicare annual wellness visit, subsequent (Primary)  Assessment & Plan:  The patient is here for health maintenance visit.  Currently, the patient consumes a healthy diet and has an adequate exercise regimen.  Screening lab work is ordered.  Immunizations were reviewed today.  Advice and education was given regarding nutrition, aerobic exercise, routine dental evaluations, routine eye exams, reproductive health, cardiovascular risk reduction, sunscreen use, self skin examination (annual dermatology evaluations) and seatbelt use (general overall safety).  Further recommendations will be given if needed after lab evaluation.  Annual wellness evaluation is recommended.      Orders:  -     CBC & Differential; Future  -     Comprehensive Metabolic Panel; Future  -     Hemoglobin A1c; Future  -     Lipid Panel; Future  -     PSA Screen; Future  -     TSH Rfx On Abnormal To Free T4; Future  -     POC Microalbumin; Future    2. Encounter for screening for  malignant neoplasm of prostate   -     PSA Screen; Future    3. Type 2 diabetes mellitus without complication, with long-term current use of insulin (CMS/[facility])  -     Hemoglobin A1c; Future  -     POC Microalbumin; Future    Patient would like to try to decrease his diabetes medications further if possible.  We will recheck A1c and if it is remained well controlled after discontinuing his insulin 6 months ago we will try to decrease his medications further.    Follow Up:  Return in about 6 months (around 8/16/2021) for Follow-up HTN, DM, HLD.     An After Visit Summary and PPPS were given to the patient.This was an audio enabled telemedicine encounter which lasted for 41 minutes.

## 2021-02-17 ENCOUNTER — IMMUNIZATION (OUTPATIENT)
Dept: VACCINE CLINIC | Facility: HOSPITAL | Age: 71
End: 2021-02-17

## 2021-02-17 PROCEDURE — 91300 HC SARSCOV02 VAC 30MCG/0.3ML IM: CPT | Performed by: INTERNAL MEDICINE

## 2021-02-17 PROCEDURE — 0001A: CPT | Performed by: INTERNAL MEDICINE

## 2021-02-23 ENCOUNTER — LAB (OUTPATIENT)
Dept: FAMILY MEDICINE CLINIC | Facility: CLINIC | Age: 71
End: 2021-02-23

## 2021-02-23 DIAGNOSIS — Z12.5 ENCOUNTER FOR SCREENING FOR MALIGNANT NEOPLASM OF PROSTATE: ICD-10-CM

## 2021-02-23 DIAGNOSIS — E11.9 TYPE 2 DIABETES MELLITUS WITHOUT COMPLICATION, WITH LONG-TERM CURRENT USE OF INSULIN (HCC): ICD-10-CM

## 2021-02-23 DIAGNOSIS — Z79.4 TYPE 2 DIABETES MELLITUS WITHOUT COMPLICATION, WITH LONG-TERM CURRENT USE OF INSULIN (HCC): ICD-10-CM

## 2021-02-23 DIAGNOSIS — Z00.00 MEDICARE ANNUAL WELLNESS VISIT, SUBSEQUENT: ICD-10-CM

## 2021-02-24 LAB
ALBUMIN SERPL-MCNC: 4.1 G/DL (ref 3.5–5.2)
ALBUMIN/GLOB SERPL: 1.9 G/DL
ALP SERPL-CCNC: 38 U/L (ref 39–117)
ALT SERPL-CCNC: 12 U/L (ref 1–41)
AST SERPL-CCNC: 20 U/L (ref 1–40)
BASOPHILS # BLD AUTO: 0.02 10*3/MM3 (ref 0–0.2)
BASOPHILS NFR BLD AUTO: 0.3 % (ref 0–1.5)
BILIRUB SERPL-MCNC: 0.4 MG/DL (ref 0–1.2)
BUN SERPL-MCNC: 19 MG/DL (ref 8–23)
BUN/CREAT SERPL: 19.6 (ref 7–25)
CALCIUM SERPL-MCNC: 9.4 MG/DL (ref 8.6–10.5)
CHLORIDE SERPL-SCNC: 105 MMOL/L (ref 98–107)
CHOLEST SERPL-MCNC: 118 MG/DL (ref 0–200)
CO2 SERPL-SCNC: 27.9 MMOL/L (ref 22–29)
CREAT SERPL-MCNC: 0.97 MG/DL (ref 0.76–1.27)
EOSINOPHIL # BLD AUTO: 0.56 10*3/MM3 (ref 0–0.4)
EOSINOPHIL NFR BLD AUTO: 7.7 % (ref 0.3–6.2)
ERYTHROCYTE [DISTWIDTH] IN BLOOD BY AUTOMATED COUNT: 12.8 % (ref 12.3–15.4)
GLOBULIN SER CALC-MCNC: 2.2 GM/DL
GLUCOSE SERPL-MCNC: 111 MG/DL (ref 65–99)
HBA1C MFR BLD: 6.2 % (ref 4.8–5.6)
HCT VFR BLD AUTO: 45.1 % (ref 37.5–51)
HDLC SERPL-MCNC: 46 MG/DL (ref 40–60)
HGB BLD-MCNC: 14.8 G/DL (ref 13–17.7)
IMM GRANULOCYTES # BLD AUTO: 0.02 10*3/MM3 (ref 0–0.05)
IMM GRANULOCYTES NFR BLD AUTO: 0.3 % (ref 0–0.5)
LDLC SERPL CALC-MCNC: 58 MG/DL (ref 0–100)
LYMPHOCYTES # BLD AUTO: 2.03 10*3/MM3 (ref 0.7–3.1)
LYMPHOCYTES NFR BLD AUTO: 27.7 % (ref 19.6–45.3)
MCH RBC QN AUTO: 30.2 PG (ref 26.6–33)
MCHC RBC AUTO-ENTMCNC: 32.8 G/DL (ref 31.5–35.7)
MCV RBC AUTO: 92 FL (ref 79–97)
MONOCYTES # BLD AUTO: 0.59 10*3/MM3 (ref 0.1–0.9)
MONOCYTES NFR BLD AUTO: 8.1 % (ref 5–12)
NEUTROPHILS # BLD AUTO: 4.1 10*3/MM3 (ref 1.7–7)
NEUTROPHILS NFR BLD AUTO: 55.9 % (ref 42.7–76)
NRBC BLD AUTO-RTO: 0 /100 WBC (ref 0–0.2)
PLATELET # BLD AUTO: 205 10*3/MM3 (ref 140–450)
POTASSIUM SERPL-SCNC: 4.7 MMOL/L (ref 3.5–5.2)
PROT SERPL-MCNC: 6.3 G/DL (ref 6–8.5)
PSA SERPL-MCNC: 0.65 NG/ML (ref 0–4)
RBC # BLD AUTO: 4.9 10*6/MM3 (ref 4.14–5.8)
SODIUM SERPL-SCNC: 141 MMOL/L (ref 136–145)
TRIGL SERPL-MCNC: 65 MG/DL (ref 0–150)
TSH SERPL DL<=0.005 MIU/L-ACNC: 2.69 UIU/ML (ref 0.27–4.2)
VLDLC SERPL CALC-MCNC: 14 MG/DL (ref 5–40)
WBC # BLD AUTO: 7.32 10*3/MM3 (ref 3.4–10.8)

## 2021-03-10 ENCOUNTER — IMMUNIZATION (OUTPATIENT)
Dept: VACCINE CLINIC | Facility: HOSPITAL | Age: 71
End: 2021-03-10

## 2021-03-10 PROCEDURE — 91300 HC SARSCOV02 VAC 30MCG/0.3ML IM: CPT | Performed by: INTERNAL MEDICINE

## 2021-03-10 PROCEDURE — 0002A: CPT | Performed by: INTERNAL MEDICINE

## 2021-03-23 DIAGNOSIS — F41.1 GAD (GENERALIZED ANXIETY DISORDER): ICD-10-CM

## 2021-03-23 DIAGNOSIS — I10 ESSENTIAL HYPERTENSION: ICD-10-CM

## 2021-03-23 RX ORDER — LISINOPRIL 20 MG/1
20 TABLET ORAL DAILY
Qty: 90 TABLET | Refills: 3 | Status: SHIPPED | OUTPATIENT
Start: 2021-03-23 | End: 2021-09-08 | Stop reason: SDUPTHER

## 2021-03-23 RX ORDER — AMLODIPINE BESYLATE 10 MG/1
10 TABLET ORAL DAILY
Qty: 90 TABLET | Refills: 3 | Status: SHIPPED | OUTPATIENT
Start: 2021-03-23 | End: 2021-09-08 | Stop reason: SDUPTHER

## 2021-03-23 RX ORDER — ESCITALOPRAM OXALATE 10 MG/1
10 TABLET ORAL DAILY
Qty: 90 TABLET | Refills: 3 | Status: SHIPPED | OUTPATIENT
Start: 2021-03-23 | End: 2021-09-08 | Stop reason: SDUPTHER

## 2021-04-08 RX ORDER — EMPAGLIFLOZIN 25 MG/1
25 TABLET, FILM COATED ORAL DAILY
Qty: 30 TABLET | Refills: 0 | OUTPATIENT
Start: 2021-04-08

## 2021-04-08 RX ORDER — METFORMIN HYDROCHLORIDE 500 MG/1
TABLET, EXTENDED RELEASE ORAL
Qty: 120 TABLET | Refills: 1 | OUTPATIENT
Start: 2021-04-08

## 2021-04-09 RX ORDER — EMPAGLIFLOZIN 25 MG/1
25 TABLET, FILM COATED ORAL DAILY
Qty: 30 TABLET | Refills: 1 | Status: SHIPPED | OUTPATIENT
Start: 2021-04-09 | End: 2021-06-22

## 2021-04-09 RX ORDER — METFORMIN HYDROCHLORIDE 500 MG/1
1000 TABLET, EXTENDED RELEASE ORAL 2 TIMES DAILY WITH MEALS
Qty: 120 TABLET | Refills: 1 | Status: SHIPPED | OUTPATIENT
Start: 2021-04-09 | End: 2021-07-02

## 2021-04-09 NOTE — TELEPHONE ENCOUNTER
PATIENT IS ASKING THAT YOU FILL THESE DIABETES MEDICATIONS FOR HIM INSTEAD OF THE ENDOCRINOLOGISTS.

## 2021-05-19 RX ORDER — METFORMIN HYDROCHLORIDE 500 MG/1
TABLET, EXTENDED RELEASE ORAL
Qty: 120 TABLET | Refills: 1 | OUTPATIENT
Start: 2021-05-19

## 2021-05-19 RX ORDER — EMPAGLIFLOZIN 25 MG/1
25 TABLET, FILM COATED ORAL DAILY
Qty: 30 TABLET | Refills: 1 | OUTPATIENT
Start: 2021-05-19

## 2021-06-22 RX ORDER — EMPAGLIFLOZIN 25 MG/1
25 TABLET, FILM COATED ORAL DAILY
Qty: 30 TABLET | Refills: 1 | Status: SHIPPED | OUTPATIENT
Start: 2021-06-22 | End: 2021-07-29

## 2021-07-02 RX ORDER — METFORMIN HYDROCHLORIDE 500 MG/1
TABLET, EXTENDED RELEASE ORAL
Qty: 120 TABLET | Refills: 1 | Status: SHIPPED | OUTPATIENT
Start: 2021-07-02 | End: 2021-07-29

## 2021-07-29 RX ORDER — EMPAGLIFLOZIN 25 MG/1
25 TABLET, FILM COATED ORAL DAILY
Qty: 30 TABLET | Refills: 2 | Status: SHIPPED | OUTPATIENT
Start: 2021-07-29 | End: 2021-09-08 | Stop reason: SDUPTHER

## 2021-07-29 RX ORDER — METFORMIN HYDROCHLORIDE 500 MG/1
TABLET, EXTENDED RELEASE ORAL
Qty: 60 TABLET | Refills: 2 | Status: SHIPPED | OUTPATIENT
Start: 2021-07-29 | End: 2021-09-08 | Stop reason: SDUPTHER

## 2021-09-08 ENCOUNTER — OFFICE VISIT (OUTPATIENT)
Dept: FAMILY MEDICINE CLINIC | Facility: CLINIC | Age: 71
End: 2021-09-08

## 2021-09-08 VITALS
DIASTOLIC BLOOD PRESSURE: 60 MMHG | WEIGHT: 192 LBS | SYSTOLIC BLOOD PRESSURE: 115 MMHG | TEMPERATURE: 98 F | HEIGHT: 69 IN | HEART RATE: 89 BPM | BODY MASS INDEX: 28.44 KG/M2 | OXYGEN SATURATION: 98 %

## 2021-09-08 DIAGNOSIS — E11.9 TYPE 2 DIABETES MELLITUS WITHOUT COMPLICATION, WITH LONG-TERM CURRENT USE OF INSULIN (HCC): ICD-10-CM

## 2021-09-08 DIAGNOSIS — G56.03 CARPAL TUNNEL SYNDROME, BILATERAL UPPER LIMBS: ICD-10-CM

## 2021-09-08 DIAGNOSIS — N52.03 COMBINED ARTERIAL INSUFFICIENCY AND CORPORO-VENOUS OCCLUSIVE ERECTILE DYSFUNCTION: ICD-10-CM

## 2021-09-08 DIAGNOSIS — Z79.4 TYPE 2 DIABETES MELLITUS WITHOUT COMPLICATION, WITH LONG-TERM CURRENT USE OF INSULIN (HCC): ICD-10-CM

## 2021-09-08 DIAGNOSIS — F41.1 GAD (GENERALIZED ANXIETY DISORDER): ICD-10-CM

## 2021-09-08 DIAGNOSIS — I10 ESSENTIAL HYPERTENSION: Primary | ICD-10-CM

## 2021-09-08 DIAGNOSIS — E78.01 FAMILIAL HYPERCHOLESTEROLEMIA: ICD-10-CM

## 2021-09-08 PROCEDURE — 99214 OFFICE O/P EST MOD 30 MIN: CPT | Performed by: FAMILY MEDICINE

## 2021-09-08 RX ORDER — LISINOPRIL 20 MG/1
20 TABLET ORAL DAILY
Qty: 90 TABLET | Refills: 3 | Status: SHIPPED | OUTPATIENT
Start: 2021-09-08 | End: 2021-11-30

## 2021-09-08 RX ORDER — METFORMIN HYDROCHLORIDE 500 MG/1
1000 TABLET, EXTENDED RELEASE ORAL 2 TIMES DAILY WITH MEALS
Qty: 180 TABLET | Refills: 3 | Status: SHIPPED | OUTPATIENT
Start: 2021-09-08 | End: 2022-05-20 | Stop reason: SDUPTHER

## 2021-09-08 RX ORDER — AMLODIPINE BESYLATE 10 MG/1
10 TABLET ORAL DAILY
Qty: 90 TABLET | Refills: 3 | Status: SHIPPED | OUTPATIENT
Start: 2021-09-08 | End: 2022-01-26

## 2021-09-08 RX ORDER — ESCITALOPRAM OXALATE 10 MG/1
10 TABLET ORAL DAILY
Qty: 90 TABLET | Refills: 3 | Status: SHIPPED | OUTPATIENT
Start: 2021-09-08 | End: 2022-05-05 | Stop reason: SDUPTHER

## 2021-09-08 RX ORDER — ROSUVASTATIN CALCIUM 20 MG/1
20 TABLET, COATED ORAL NIGHTLY
Qty: 90 TABLET | Refills: 3 | Status: SHIPPED | OUTPATIENT
Start: 2021-09-08 | End: 2022-03-08

## 2021-09-08 RX ORDER — SILDENAFIL 25 MG/1
25 TABLET, FILM COATED ORAL DAILY PRN
Qty: 30 TABLET | Refills: 6 | Status: SHIPPED | OUTPATIENT
Start: 2021-09-08 | End: 2022-09-08 | Stop reason: CLARIF

## 2021-09-08 NOTE — PROGRESS NOTES
Kvng Li is a 71 y.o. male who presents today for Diabetes (follow up ) and Hypertension      Patient is 71 year old man here to follow up on his DM2 and HTN.     Diabetes:  Patient checks his glucose every morning, normally between 100-110. He has not felt faint nor dizziness. Patient gets woken up with tingling fingers, bilaterally L>R, mainly fingers 1-3, aggravated by use in the day, repositioning will help, but does not always go completely away. His left hand will tingle throughout the day when sitting still, but will alleviate with use. Patient denies numbness nor tingling in his feet. Patient states he will get up twice per night, every night, to urinate with a full bladder that comes out in a good solid stream, otherwise he sleeps well, in bed 8 hrs per night. Patient denies any vision issues. Just had his eye exam and new glasses.    Hypertension:  Patient states does not take his blood pressure measurements at home. He feels the lisinopril and amlodipine are at appropriate level. He denies light headedness. He states he takes his medicine consistently every day. He takes the majority in the morning after breakfast, at night he takes metformin and rosuvastatin. He pretty much eats what he wants but keeps his does monitor his carb intake.  Consistent with 3 meals per day. Patient does not specifically cut sugar out of his diet, he will eat desserts from time to time if offered, drinks sugar-free root beer with lunch. For exercise he does a lot of projects around his house, remodeling his bathroom.  Patient walks about 2-3 times per week with his dogs, about 1/2 hr each time.         Review of Systems   Constitutional: Negative for fever and unexpected weight loss.   HENT: Negative for congestion, ear pain and sore throat.    Eyes: Negative for blurred vision, double vision, photophobia, pain, discharge, redness, itching and visual disturbance.   Respiratory: Negative for cough, shortness of breath  "and wheezing.    Cardiovascular: Negative for chest pain, palpitations and leg swelling.   Gastrointestinal: Negative for abdominal pain, blood in stool, constipation, diarrhea, nausea, vomiting, GERD and indigestion.   Endocrine: Negative for cold intolerance, heat intolerance, polydipsia, polyphagia and polyuria.   Genitourinary: Negative for difficulty urinating, dysuria, flank pain and nocturia.   Musculoskeletal: Negative for back pain, joint swelling and neck pain.   Skin: Negative for rash and skin lesions.   Allergic/Immunologic: Negative for environmental allergies.   Neurological: Negative for dizziness, seizures, syncope, light-headedness, headache and memory problem.   Hematological: Does not bruise/bleed easily.   Psychiatric/Behavioral: Negative for suicidal ideas.        The following portions of the patient's history were reviewed and updated as appropriate: allergies, current medications, past family history, past medical history, past social history, past surgical history and problem list.    Current Outpatient Medications on File Prior to Visit   Medication Sig Dispense Refill   • glucose blood test strip 1 each by Other route 4 (Four) Times a Day After Meals & at Bedtime. Use as instructed 100 each 12   • Lancets (freestyle) lancets 1 each by Other route 4 (Four) Times a Day After Meals & at Bedtime. 100 each 12     No current facility-administered medications on file prior to visit.       No Known Allergies     Visit Vitals  /60   Pulse 89   Temp 98 °F (36.7 °C)   Ht 175.3 cm (69\")   Wt 87.1 kg (192 lb)   SpO2 98%   BMI 28.35 kg/m²        Physical Exam  Constitutional:       General: He is not in acute distress.     Appearance: He is well-developed. He is not diaphoretic.   HENT:      Head: Atraumatic.   Cardiovascular:      Rate and Rhythm: Normal rate and regular rhythm.      Heart sounds: Normal heart sounds. No murmur heard.   No friction rub. No gallop.    Pulmonary:      Effort: " Pulmonary effort is normal. No respiratory distress.      Breath sounds: Normal breath sounds. No stridor. No wheezing, rhonchi or rales.   Musculoskeletal:      Cervical back: Normal range of motion and neck supple.   Skin:     General: Skin is warm and dry.   Neurological:      Mental Status: He is alert and oriented to person, place, and time.   Psychiatric:         Behavior: Behavior normal.               Problems Addressed this Visit        Cardiac and Vasculature    HTN (hypertension) - Primary     Hypertension is unchanged.  Continue current treatment regimen.  Blood pressure will be reassessed at the next regular appointment.         Relevant Medications    amLODIPine (NORVASC) 10 MG tablet    lisinopril (PRINIVIL,ZESTRIL) 20 MG tablet    Other Relevant Orders    CBC & Differential (Completed)    Comprehensive Metabolic Panel (Completed)    Hemoglobin A1c (Completed)    HLD (hyperlipidemia)     Lipid abnormalities are unchanged.  Pharmacotherapy as ordered.  Lipids will be reassessed in 6 months.         Relevant Medications    rosuvastatin (CRESTOR) 20 MG tablet       Endocrine and Metabolic    DM2 (diabetes mellitus, type 2) (CMS/[facility])     Diabetes is likely still well controlled per patient's sugar log despite stopping insulin at last visit..   Continue current treatment regimen.  Diabetes will be reassessed in 6 months.         Relevant Medications    empagliflozin (Jardiance) 25 MG tablet tablet    metFORMIN ER (GLUCOPHAGE-XR) 500 MG 24 hr tablet    rosuvastatin (CRESTOR) 20 MG tablet    Other Relevant Orders    CBC & Differential (Completed)    Comprehensive Metabolic Panel (Completed)    Hemoglobin A1c (Completed)       Genitourinary and Reproductive     ED (erectile dysfunction)    Relevant Medications    sildenafil (Viagra) 25 MG tablet       Mental Health    JEFE (generalized anxiety disorder)    Relevant Medications    escitalopram (LEXAPRO) 10 MG tablet       Other    Carpal tunnel syndrome,  bilateral upper limbs     Symptoms hands consistent with carpal tunnel.  Patient will try bracing at night as well as NSAIDs as needed.  He is given referral to orthopedic surgery for further evaluation and treatment.         Relevant Orders    Ambulatory Referral to Orthopedic Surgery      Diagnoses       Codes Comments    Essential hypertension    -  Primary ICD-10-CM: I10  ICD-9-CM: 401.9     Type 2 diabetes mellitus without complication, with long-term current use of insulin (CMS/HCC)     ICD-10-CM: E11.9, Z79.4  ICD-9-CM: 250.00, V58.67     JEFE (generalized anxiety disorder)     ICD-10-CM: F41.1  ICD-9-CM: 300.02     Familial hypercholesterolemia     ICD-10-CM: E78.01  ICD-9-CM: 272.0     Carpal tunnel syndrome, bilateral upper limbs     ICD-10-CM: G56.03  ICD-9-CM: 354.0     Combined arterial insufficiency and corporo-venous occlusive erectile dysfunction     ICD-10-CM: N52.03  ICD-9-CM: 607.84           Return in about 6 months (around 3/8/2022) for Medicare Wellness.    Parts of this office note have been dictated by voice recognition software. Grammatical and/or spelling errors may be present. I attest that I have reviewed the student note and that the components of the history of the present illness, the physical exam, and the assessment and plan documented were performed by me or were performed in my presence by the student and verified by me.      Rojelio Gould MD   9/12/2021

## 2021-09-09 LAB
ALBUMIN SERPL-MCNC: 4.6 G/DL (ref 3.7–4.7)
ALBUMIN/GLOB SERPL: 2 {RATIO} (ref 1.2–2.2)
ALP SERPL-CCNC: 39 IU/L (ref 48–121)
ALT SERPL-CCNC: 16 IU/L (ref 0–44)
AST SERPL-CCNC: 20 IU/L (ref 0–40)
BASOPHILS # BLD AUTO: 0 X10E3/UL (ref 0–0.2)
BASOPHILS NFR BLD AUTO: 0 %
BILIRUB SERPL-MCNC: 0.5 MG/DL (ref 0–1.2)
BUN SERPL-MCNC: 23 MG/DL (ref 8–27)
BUN/CREAT SERPL: 25 (ref 10–24)
CALCIUM SERPL-MCNC: 9.3 MG/DL (ref 8.6–10.2)
CHLORIDE SERPL-SCNC: 106 MMOL/L (ref 96–106)
CO2 SERPL-SCNC: 25 MMOL/L (ref 20–29)
CREAT SERPL-MCNC: 0.91 MG/DL (ref 0.76–1.27)
EOSINOPHIL # BLD AUTO: 0.7 X10E3/UL (ref 0–0.4)
EOSINOPHIL NFR BLD AUTO: 8 %
ERYTHROCYTE [DISTWIDTH] IN BLOOD BY AUTOMATED COUNT: 13 % (ref 11.6–15.4)
GLOBULIN SER CALC-MCNC: 2.3 G/DL (ref 1.5–4.5)
GLUCOSE SERPL-MCNC: 91 MG/DL (ref 65–99)
HBA1C MFR BLD: 7.1 % (ref 4.8–5.6)
HCT VFR BLD AUTO: 45.2 % (ref 37.5–51)
HGB BLD-MCNC: 14.7 G/DL (ref 13–17.7)
IMM GRANULOCYTES # BLD AUTO: 0 X10E3/UL (ref 0–0.1)
IMM GRANULOCYTES NFR BLD AUTO: 0 %
LYMPHOCYTES # BLD AUTO: 2.5 X10E3/UL (ref 0.7–3.1)
LYMPHOCYTES NFR BLD AUTO: 29 %
MCH RBC QN AUTO: 29.9 PG (ref 26.6–33)
MCHC RBC AUTO-ENTMCNC: 32.5 G/DL (ref 31.5–35.7)
MCV RBC AUTO: 92 FL (ref 79–97)
MONOCYTES # BLD AUTO: 0.7 X10E3/UL (ref 0.1–0.9)
MONOCYTES NFR BLD AUTO: 8 %
NEUTROPHILS # BLD AUTO: 4.9 X10E3/UL (ref 1.4–7)
NEUTROPHILS NFR BLD AUTO: 55 %
PLATELET # BLD AUTO: 205 X10E3/UL (ref 150–450)
POTASSIUM SERPL-SCNC: 4.9 MMOL/L (ref 3.5–5.2)
PROT SERPL-MCNC: 6.9 G/DL (ref 6–8.5)
RBC # BLD AUTO: 4.92 X10E6/UL (ref 4.14–5.8)
SODIUM SERPL-SCNC: 143 MMOL/L (ref 134–144)
WBC # BLD AUTO: 8.7 X10E3/UL (ref 3.4–10.8)

## 2021-09-12 PROBLEM — G56.03 CARPAL TUNNEL SYNDROME, BILATERAL UPPER LIMBS: Status: ACTIVE | Noted: 2021-09-12

## 2021-09-12 NOTE — ASSESSMENT & PLAN NOTE
Symptoms hands consistent with carpal tunnel.  Patient will try bracing at night as well as NSAIDs as needed.  He is given referral to orthopedic surgery for further evaluation and treatment.

## 2021-09-12 NOTE — ASSESSMENT & PLAN NOTE
Diabetes is likely still well controlled per patient's sugar log despite stopping insulin at last visit..   Continue current treatment regimen.  Diabetes will be reassessed in 6 months.

## 2021-10-05 ENCOUNTER — OFFICE VISIT (OUTPATIENT)
Dept: ORTHOPEDIC SURGERY | Facility: CLINIC | Age: 71
End: 2021-10-05

## 2021-10-05 VITALS
HEIGHT: 69 IN | HEART RATE: 80 BPM | WEIGHT: 184 LBS | DIASTOLIC BLOOD PRESSURE: 87 MMHG | BODY MASS INDEX: 27.25 KG/M2 | SYSTOLIC BLOOD PRESSURE: 117 MMHG

## 2021-10-05 DIAGNOSIS — R20.0 NUMBNESS AND TINGLING IN BOTH HANDS: Primary | ICD-10-CM

## 2021-10-05 DIAGNOSIS — R20.2 NUMBNESS AND TINGLING IN BOTH HANDS: Primary | ICD-10-CM

## 2021-10-05 PROCEDURE — 99204 OFFICE O/P NEW MOD 45 MIN: CPT | Performed by: ORTHOPAEDIC SURGERY

## 2021-10-05 NOTE — PROGRESS NOTES
[facility]        Subjective     Pain of the Left Hand and Pain of the Right Hand      HPI    Kvng Li is a 71 y.o. male who presents with new problem of: bilateral hand pain.  Onset: atraumatic and gradual in nature. The issue has been ongoing for 10 year(s). Pain is a 8/10 on the pain scale. Pain is described as aching and throbbing. Associated symptoms include stiffness. The pain is worse with working; resting improve the pain. Previous treatments have included: NSAIDS.    I have reviewed the following portions of the patient's history:History of Present Illness and review of systems.      Patient is here today for a new problem regarding his hands.  He is left-hand dominant and has equal symptoms of numbness and tingling in his hands and this has been going on for the last 10 years or so.  This gets worse after he does a project with his hands.  He does have diabetes.  He is here at the request of Dr. Gould for further evaluation and treatment.    Past Medical History:   Diagnosis Date   • DM2 (diabetes mellitus, type 2) ([facility])    • ED (erectile dysfunction)    • JEFE (generalized anxiety disorder)    • HLD (hyperlipidemia)    • HTN (hypertension)    • OA (osteoarthritis) of knee    • Obesity    • Right inguinal hernia       Past Surgical History:   Procedure Laterality Date   • COLONOSCOPY  2011   • KNEE ARTHROSCOPY Right 2013   • REPLACEMENT TOTAL KNEE Right 2015   • UPPER GASTROINTESTINAL ENDOSCOPY  2015      Family History   Problem Relation Age of Onset   • Alzheimer's disease Mother    • Kidney disease Father    • Alzheimer's disease Sister    • Diabetes Sister    • Arthritis Brother    • No Known Problems Son    • Arthritis Maternal Grandmother    • Heart attack Maternal Grandmother    • Lung disease Maternal Grandfather    • Other Paternal Grandmother         Unknown   • Stroke Paternal Grandfather    • Angina Brother    • Diabetes Brother    • Other Sister          Unknown   • Diabetes Sister    • Heart disease Sister    • Hypertension Sister    • No Known Problems Son      Social History     Socioeconomic History   • Marital status:      Spouse name: Deedee   • Number of children: 2   • Years of education: H.S.   • Highest education level: High school graduate   Tobacco Use   • Smoking status: Never Smoker   • Smokeless tobacco: Never Used   Substance and Sexual Activity   • Alcohol use: Yes     Alcohol/week: 5.0 standard drinks     Types: 5 Cans of beer per week   • Drug use: No   • Sexual activity: Yes     Partners: Female     Comment: wife      Current Outpatient Medications on File Prior to Visit   Medication Sig Dispense Refill   • amLODIPine (NORVASC) 10 MG tablet Take 1 tablet by mouth Daily. 90 tablet 3   • empagliflozin (Jardiance) 25 MG tablet tablet Take 1 tablet by mouth Daily. NEEDS AN APPT FOR REFILLS 30 tablet 2   • escitalopram (LEXAPRO) 10 MG tablet Take 1 tablet by mouth Daily. 90 tablet 3   • glucose blood test strip 1 each by Other route 4 (Four) Times a Day After Meals & at Bedtime. Use as instructed 100 each 12   • Lancets (freestyle) lancets 1 each by Other route 4 (Four) Times a Day After Meals & at Bedtime. 100 each 12   • lisinopril (PRINIVIL,ZESTRIL) 20 MG tablet Take 1 tablet by mouth Daily. 90 tablet 3   • metFORMIN ER (GLUCOPHAGE-XR) 500 MG 24 hr tablet Take 2 tablets by mouth 2 (Two) Times a Day With Meals. 180 tablet 3   • rosuvastatin (CRESTOR) 20 MG tablet Take 1 tablet by mouth Every Night. 90 tablet 3   • sildenafil (Viagra) 25 MG tablet Take 1 tablet by mouth Daily As Needed for Erectile Dysfunction. 30 tablet 6     No current facility-administered medications on file prior to visit.      No Known Allergies       Review of Systems   Constitutional: Negative.    HENT: Negative.    Eyes: Negative.    Respiratory: Negative.    Cardiovascular: Negative.    Gastrointestinal: Negative.    Endocrine: Negative.    Genitourinary: Negative.  "   Musculoskeletal: Positive for arthralgias.   Skin: Negative.    Allergic/Immunologic: Negative.    Neurological: Negative.    Hematological: Negative.    Psychiatric/Behavioral: Negative.         I reviewed the patient's chief complaint, history of present illness, review of systems, past medical history, surgical history, family history, social history, medications and allergy list.        Objective      Physical Exam  /87   Pulse 80   Ht 175.3 cm (69.02\")   Wt 83.5 kg (184 lb)   BMI 27.16 kg/m²     Body mass index is 27.16 kg/m².    General  Mental Status - alert  General Appearance - cooperative, well groomed, not in acute distress  Orientation - Oriented X3  Build & Nutrition - well developed and well nourished  Posture - normal posture  Gait - normal gait       Ortho Exam  Peripheral Vascular   Bilateral Upper Extremity    No cyanotic nail beds    Pink nail beds and rapid capillary refill   Palpation    Radial Pulse - Bilaterally normal    Neurologic   Sensory: Light touch intact- Right and left hand    Left Upper Extremity    Left wrist extensors: 5/5    Left wrist flexors: 5/5    Left intrinsics: 5/5      Right Upper Extremity    Right wrist extensors: 5/5    Right wrist flexors: 5/5    Right intrinsics: 5/5    Musculoskeletal   Left Elbow    Forearm supination: AROM - 90 degrees    Forearm pronation: AROM - 90 degrees   Right Elbow    Forearm supination: AROM - 90 degrees    Forearm pronation: AROM - 90 degrees     Inspection and Palpation   Right Wrist      Tenderness - none    Swelling - none    Crepitus - none    Muscle tone - no atrophy   Left Wrist    Tenderness - none    Swelling - none    Crepitus - none    Muscle tone - no atrophy     ROJM:   Left Wrist    Flexion: AROM - 90 degrees    Extension: AROM - 90 degrees   Right Wrist    Flexion: AROM - 90 degrees    Extension: AROM - 90 degrees     Deformities, Malalignments, Discrepancies    None     Functional Testing   Right    Tinel's " Sign-- negative    Phalen's Sign--negative    Carpal Compression Test--positive    Thenar wasting--minimal    APB--4+/5    Elbow Flexion test--negative    Cubital tunnel signs--negative   Left     Tinel's Sign--negative    Phalen's Sign--negative    Carpal Compression Test-- positive    Thenar Wasting--minimal    APB--4+/5    Elbow Flexion test--negative    Cubital tunnel signs--negative       Strength and Tone    Right  strength: good    Left  strength: good     Hand Exam:          Imaging/Studies  Imaging Results (Last 24 Hours)     Procedure Component Value Units Date/Time    XR Hand 3+ View Bilateral [[id number]] Resulted: 10/05/21 0938     Updated: 10/05/21 0940    Narrative:      Bilateral Hand X-Ray    Indication: Pain    Views:  AP, Lateral, and Oblique     Comparison: None    Findings:  No fracture  No bony lesion  Normal soft tissues  There are diffuse mild early moderate degenerative changes noted   throughout the IP joints.  There is severe left greater than right CMC   arthritis of the thumb.    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 Numbness and tingling in both hands        Plan:  1. Continue over-the-counter medication as needed for discomfort  2. Numbness and tingling bilateral hands in a patient with type 2 diabetes--differential diagnosis is diabetic neuropathy versus carpal tunnel syndrome.  Given the duration of symptoms, I am favoring the latter.  Nerve studies will be ordered to evaluate things further.  I will see him back and base future treatment on severity of median nerve compression.        Yanick Tello MD  10/05/21  10:20 EDT    Dragon disclaimer:  Much of this encounter note is an electronic transcription/translation of spoken language to printed text. The electronic translation of spoken language  may permit erroneous, or at times, nonsensical words or phrases to be inadvertently transcribed; Although I have reviewed the note for such errors, some may still exist.

## 2021-10-25 ENCOUNTER — OFFICE VISIT (OUTPATIENT)
Dept: FAMILY MEDICINE CLINIC | Facility: CLINIC | Age: 71
End: 2021-10-25

## 2021-10-25 VITALS
HEIGHT: 69 IN | TEMPERATURE: 97.8 F | RESPIRATION RATE: 16 BRPM | DIASTOLIC BLOOD PRESSURE: 68 MMHG | OXYGEN SATURATION: 98 % | BODY MASS INDEX: 28.47 KG/M2 | HEART RATE: 61 BPM | SYSTOLIC BLOOD PRESSURE: 120 MMHG | WEIGHT: 192.2 LBS

## 2021-10-25 DIAGNOSIS — R09.89 PHLEGM IN THROAT: ICD-10-CM

## 2021-10-25 DIAGNOSIS — R05.9 COUGH: ICD-10-CM

## 2021-10-25 DIAGNOSIS — K40.90 INGUINAL HERNIA, LEFT: Primary | ICD-10-CM

## 2021-10-25 PROCEDURE — 99214 OFFICE O/P EST MOD 30 MIN: CPT | Performed by: STUDENT IN AN ORGANIZED HEALTH CARE EDUCATION/TRAINING PROGRAM

## 2021-10-25 PROCEDURE — G0008 ADMIN INFLUENZA VIRUS VAC: HCPCS | Performed by: STUDENT IN AN ORGANIZED HEALTH CARE EDUCATION/TRAINING PROGRAM

## 2021-10-25 PROCEDURE — 90662 IIV NO PRSV INCREASED AG IM: CPT | Performed by: STUDENT IN AN ORGANIZED HEALTH CARE EDUCATION/TRAINING PROGRAM

## 2021-10-25 RX ORDER — AMLODIPINE BESYLATE 10 MG/1
TABLET ORAL
COMMUNITY
Start: 2021-10-15 | End: 2021-10-25 | Stop reason: SDUPTHER

## 2021-10-25 RX ORDER — ESCITALOPRAM OXALATE 10 MG/1
TABLET ORAL
COMMUNITY
Start: 2021-10-15 | End: 2021-10-25 | Stop reason: SDUPTHER

## 2021-10-25 RX ORDER — LISINOPRIL 20 MG/1
TABLET ORAL
COMMUNITY
Start: 2021-10-15 | End: 2021-10-25 | Stop reason: SDUPTHER

## 2021-10-25 NOTE — PROGRESS NOTES
"  Established Patient Office Visit      Subjective      Chief Complaint:  Cough (wheezing at times, chest monse, been going on for a month-in the last week gotten worse, cough only produces minimal sputum, groin pain-left center he said, would like a flu vaccine)      History of Present Illness: Kvng Li is a 71 y.o. male who presents for increased phlegm production over the past month.  He describes increased phlegm and the need to clear his throat.  He describes need to cough to clear it.  He states he has \"wheezing\" when breathing.  He denies any shortness of breath.  Only mild sputum production and denies any hemoptysis.  He denies any rhinorrhea, cough or itchy eyes.    Pain to left groin for about a year.  This has gotten worse over the past several months. Worse with cough.  Had episode significant pain 2 days ago. Normal stooling and urination but mild urinary frequency.  Denies any history of hernias or hernia repairs.    Past Medical History:   Diagnosis Date   • DM2 (diabetes mellitus, type 2) ([facility])    • ED (erectile dysfunction)    • JEFE (generalized anxiety disorder)    • HLD (hyperlipidemia)    • HTN (hypertension)    • OA (osteoarthritis) of knee    • Obesity    • Right inguinal hernia        Patient Active Problem List   Diagnosis   • HTN (hypertension)   • HLD (hyperlipidemia)   • JEFE (generalized anxiety disorder)   • DM2 (diabetes mellitus, type 2) ([facility])   • Medicare annual wellness visit, subsequent   • ED (erectile dysfunction)   • Carpal tunnel syndrome, bilateral upper limbs         Current Outpatient Medications:   •  amLODIPine (NORVASC) 10 MG tablet, Take 1 tablet by mouth Daily., Disp: 90 tablet, Rfl: 3  •  empagliflozin (Jardiance) 25 MG tablet tablet, Take 1 tablet by mouth Daily. NEEDS AN APPT FOR REFILLS, Disp: 30 tablet, Rfl: 2  •  escitalopram (LEXAPRO) 10 MG tablet, Take 1 tablet by mouth Daily., Disp: 90 tablet, Rfl: 3  •  glucose blood test strip, 1 each by Other route 4 " "(Four) Times a Day After Meals & at Bedtime. Use as instructed, Disp: 100 each, Rfl: 12  •  Lancets (freestyle) lancets, 1 each by Other route 4 (Four) Times a Day After Meals & at Bedtime., Disp: 100 each, Rfl: 12  •  lisinopril (PRINIVIL,ZESTRIL) 20 MG tablet, Take 1 tablet by mouth Daily., Disp: 90 tablet, Rfl: 3  •  metFORMIN ER (GLUCOPHAGE-XR) 500 MG 24 hr tablet, Take 2 tablets by mouth 2 (Two) Times a Day With Meals., Disp: 180 tablet, Rfl: 3  •  rosuvastatin (CRESTOR) 20 MG tablet, Take 1 tablet by mouth Every Night., Disp: 90 tablet, Rfl: 3  •  sildenafil (Viagra) 25 MG tablet, Take 1 tablet by mouth Daily As Needed for Erectile Dysfunction., Disp: 30 tablet, Rfl: 6      Objective     Physical Exam:   Vital Signs:   /68 (BP Location: Right arm, Patient Position: Sitting, Cuff Size: Adult)   Pulse 61   Temp 97.8 °F (36.6 °C) (Temporal)   Resp 16   Ht 175.3 cm (69\")   Wt 87.2 kg (192 lb 3.2 oz)   SpO2 98%   BMI 28.38 kg/m²      Physical Exam  Constitutional:       General: He is not in acute distress.      Appearance: He is not ill-appearing.   Cardiovascular:      Rate and Rhythm: Normal rate and regular rhythm.   Pulmonary:      Effort: Pulmonary effort is normal.      Breath sounds: Normal breath sounds.  Rhonchi to upper lung fields that does not go away after cough.  Abdominal:     Patient has hernia to left suprapubic region.  Fully reducible.  Mild tenderness to palpation of the area.  Mild bulge seen visibly.  Neurological:      Mental Status: He is alert.   Psychiatric:         Thought Content: Thought content normal.   MSK  No pain to palpation of left hip.  No pain to flexion extension or internal or external rotation of the hip.  HEENT  Mild erythema to the [place] arch but unable to see the most of oropharynx.  Nasal turbinates are inflamed bilaterally.         Assessment / Plan      Assessment/Plan:   Diagnoses and all orders for this visit:    1. Inguinal hernia, left (Primary) "   -     Ambulatory Referral to General Surgery  -     US abdomen          2. Phlegm in throat  -     XR Chest PA & Lateral; Future    3. Cough  -     XR Chest PA & Lateral; Future  -     Patient does not have constant cough but rather seems to have the need to cough to clear the phlegm from his throat.    Other orders  -     High-dose flu vaccine     For the inguinal hernia to the left I will send him to general surgery and get a ultrasound of abdomen. Emergency precautions discussed.     I don't have a great explanation for the increased phlegm in the throat.  He denies any allergic symptoms but may be related for post nasal drip. I will get a chest x-ray to make sure there is no evidence of bronchial involvement.  Mucinex 600 mg twice daily.  Start Flonase daily.  Increase water intake to minimum 64 ounces.  Follow-up in 2 weeks.  If is not improving will send to ENT for possible laryngoscopy.    Follow Up:   Return in about 2 weeks (around 11/8/2021) for Follow-up.    Medical decision making: New problems uncertain significance.  I reviewed his previous hemoglobin A1c that is almost at goal and CMP and CBC.    Shane Ogden MD  Family Medicine - [facility] [place]

## 2021-10-25 NOTE — PATIENT INSTRUCTIONS
Regular mucinex every 12 hours. Drink at least 64 ounces of water.   Flonase one daily each nostril.     Go to get Chest x-ray.     I will order CT and referral to general surgery for hernia.     numerical 0-10

## 2021-10-26 ENCOUNTER — HOSPITAL ENCOUNTER (OUTPATIENT)
Dept: GENERAL RADIOLOGY | Facility: HOSPITAL | Age: 71
Discharge: HOME | End: 2021-10-26
Admitting: STUDENT IN AN ORGANIZED HEALTH CARE EDUCATION/TRAINING PROGRAM

## 2021-10-26 DIAGNOSIS — R05.9 COUGH: ICD-10-CM

## 2021-10-26 DIAGNOSIS — R09.89 PHLEGM IN THROAT: ICD-10-CM

## 2021-10-26 PROCEDURE — 71046 X-RAY EXAM CHEST 2 VIEWS: CPT

## 2021-11-06 ENCOUNTER — HOSPITAL ENCOUNTER (OUTPATIENT)
Dept: ULTRASOUND IMAGING | Facility: HOSPITAL | Age: 71
Discharge: HOME | End: 2021-11-06
Admitting: STUDENT IN AN ORGANIZED HEALTH CARE EDUCATION/TRAINING PROGRAM

## 2021-11-06 DIAGNOSIS — K40.90 INGUINAL HERNIA, LEFT: ICD-10-CM

## 2021-11-06 PROCEDURE — 76705 ECHO EXAM OF ABDOMEN: CPT

## 2021-11-08 ENCOUNTER — OFFICE VISIT (OUTPATIENT)
Dept: FAMILY MEDICINE CLINIC | Facility: CLINIC | Age: 71
End: 2021-11-08

## 2021-11-08 VITALS
HEIGHT: 69 IN | OXYGEN SATURATION: 99 % | TEMPERATURE: 98.7 F | DIASTOLIC BLOOD PRESSURE: 80 MMHG | BODY MASS INDEX: 28.29 KG/M2 | HEART RATE: 71 BPM | WEIGHT: 191 LBS | SYSTOLIC BLOOD PRESSURE: 130 MMHG

## 2021-11-08 DIAGNOSIS — R09.82 POST-NASAL DRIP: ICD-10-CM

## 2021-11-08 DIAGNOSIS — J30.89 ENVIRONMENTAL AND SEASONAL ALLERGIES: Primary | ICD-10-CM

## 2021-11-08 DIAGNOSIS — R06.2 WHEEZING: ICD-10-CM

## 2021-11-08 PROCEDURE — 99213 OFFICE O/P EST LOW 20 MIN: CPT | Performed by: FAMILY MEDICINE

## 2021-11-08 RX ORDER — FLUTICASONE PROPIONATE 50 MCG
2 SPRAY, SUSPENSION (ML) NASAL DAILY
Qty: 18.2 ML | Refills: 3 | Status: SHIPPED | OUTPATIENT
Start: 2021-11-08 | End: 2021-11-30

## 2021-11-08 RX ORDER — ALBUTEROL SULFATE 90 UG/1
2 AEROSOL, METERED RESPIRATORY (INHALATION) EVERY 4 HOURS PRN
Qty: 18 G | Refills: 3 | Status: SHIPPED | OUTPATIENT
Start: 2021-11-08 | End: 2022-01-11 | Stop reason: SDUPTHER

## 2021-11-08 RX ORDER — LORATADINE 10 MG/1
10 TABLET ORAL DAILY
Qty: 30 TABLET | Refills: 3 | Status: SHIPPED | OUTPATIENT
Start: 2021-11-08 | End: 2021-11-21

## 2021-11-08 NOTE — PROGRESS NOTES
Kvng Li is a 71 y.o. male who presents today for Nasal Congestion (follow up )      Patient continues to have nasal congestion, wheezing at night when he lays down, and then coughing uo a lot of mucus in the morning. No wheezing during the day and does not have much drainage during the day but he does find himself clearing his throat a lot. He has been taking mucinex for this for the past month to 6 weeks. He has seen significant improvement but it has not resolved completely. He denies fever, chills, N/V/D/C, urinary symptoms, chest pain, SOA, palpitations, sinus pressure, or headache.      The following portions of the patient's history were reviewed and updated as appropriate: allergies, current medications, past family history, past medical history, past social history, past surgical history and problem list.    Current Outpatient Medications on File Prior to Visit   Medication Sig Dispense Refill   • amLODIPine (NORVASC) 10 MG tablet Take 1 tablet by mouth Daily. 90 tablet 3   • empagliflozin (Jardiance) 25 MG tablet tablet Take 1 tablet by mouth Daily. NEEDS AN APPT FOR REFILLS 30 tablet 2   • escitalopram (LEXAPRO) 10 MG tablet Take 1 tablet by mouth Daily. 90 tablet 3   • glucose blood test strip 1 each by Other route 4 (Four) Times a Day After Meals & at Bedtime. Use as instructed 100 each 12   • Lancets (freestyle) lancets 1 each by Other route 4 (Four) Times a Day After Meals & at Bedtime. 100 each 12   • lisinopril (PRINIVIL,ZESTRIL) 20 MG tablet Take 1 tablet by mouth Daily. 90 tablet 3   • metFORMIN ER (GLUCOPHAGE-XR) 500 MG 24 hr tablet Take 2 tablets by mouth 2 (Two) Times a Day With Meals. 180 tablet 3   • rosuvastatin (CRESTOR) 20 MG tablet Take 1 tablet by mouth Every Night. 90 tablet 3   • sildenafil (Viagra) 25 MG tablet Take 1 tablet by mouth Daily As Needed for Erectile Dysfunction. 30 tablet 6     No current facility-administered medications on file prior to visit.       No Known  "Allergies     Visit Vitals  /80   Pulse 71   Temp 98.7 °F (37.1 °C)   Ht 175.3 cm (69\")   Wt 86.6 kg (191 lb)   SpO2 99%   BMI 28.21 kg/m²        Physical Exam  Constitutional:       General: He is not in acute distress.     Appearance: He is well-developed. He is not diaphoretic.   HENT:      Head: Atraumatic.   Cardiovascular:      Rate and Rhythm: Normal rate and regular rhythm.      Heart sounds: Normal heart sounds. No murmur heard.  No friction rub. No gallop.    Pulmonary:      Effort: Pulmonary effort is normal. No respiratory distress.      Breath sounds: Normal breath sounds. No wheezing or rales.   Abdominal:      General: Bowel sounds are normal. There is no distension.      Palpations: Abdomen is soft.      Tenderness: There is no abdominal tenderness.   Musculoskeletal:      Cervical back: Normal range of motion and neck supple.   Skin:     General: Skin is warm and dry.   Neurological:      Mental Status: He is alert and oriented to person, place, and time.   Psychiatric:         Behavior: Behavior normal.               Problems Addressed this Visit        Allergies and Adverse Reactions    Environmental and seasonal allergies - Primary     Patient's pharmacy seasonal allergies most likely leading to postnasal drip causing the chronic cough that he has not been able to clear.  Patient will be started on Flonase and Claritin to take daily and then as needed after resolution of symptoms.  He was also given albuterol inhaler to take at night to aid with breathing and decreased wheeze.  RTC/ED precautions given.         Relevant Medications    fluticasone (Flonase) 50 MCG/ACT nasal spray    albuterol sulfate  (90 Base) MCG/ACT inhaler    loratadine (CLARITIN) 10 MG tablet      Other Visit Diagnoses     Post-nasal drip        Relevant Medications    fluticasone (Flonase) 50 MCG/ACT nasal spray    Wheezing        Relevant Medications    albuterol sulfate  (90 Base) MCG/ACT inhaler    "   Diagnoses       Codes Comments    Environmental and seasonal allergies    -  Primary ICD-10-CM: J30.89  ICD-9-CM: 477.8     Post-nasal drip     ICD-10-CM: R09.82  ICD-9-CM: 784.91     Wheezing     ICD-10-CM: R06.2  ICD-9-CM: 786.07           Return if symptoms worsen or fail to improve.    Rojelio Gould MD   11/9/2021

## 2021-11-09 PROBLEM — J30.89 ENVIRONMENTAL AND SEASONAL ALLERGIES: Status: ACTIVE | Noted: 2021-11-09

## 2021-11-09 NOTE — ASSESSMENT & PLAN NOTE
Patient's pharmacy seasonal allergies most likely leading to postnasal drip causing the chronic cough that he has not been able to clear.  Patient will be started on Flonase and Claritin to take daily and then as needed after resolution of symptoms.  He was also given albuterol inhaler to take at night to aid with breathing and decreased wheeze.  RTC/ED precautions given.

## 2021-11-24 ENCOUNTER — TELEPHONE (OUTPATIENT)
Dept: FAMILY MEDICINE CLINIC | Facility: CLINIC | Age: 71
End: 2021-11-24

## 2021-11-24 NOTE — TELEPHONE ENCOUNTER
Caller: Kvng Li    Relationship to patient: Self    Best call back number: [phone]    Chief complaint: ONGOING COUGH     Type of visit: SAME DAY    Requested date: ANYTIME    Additional notes: PATIENT HAS BEEN SEEN BY ANOTHER DOCTOR IN THE PRACTICE AND URGENT TREATMENT FOR THE SAME THING. HE PREFERS DR MCKEE TO ASSESS. PLEASE CALL TO SCHEDULE AND YOU CAN LEAVE A DETAILED VM IF HE IS UNABLE TO ANSWER.

## 2021-11-30 ENCOUNTER — OFFICE VISIT (OUTPATIENT)
Dept: FAMILY MEDICINE CLINIC | Facility: CLINIC | Age: 71
End: 2021-11-30

## 2021-11-30 VITALS
SYSTOLIC BLOOD PRESSURE: 110 MMHG | TEMPERATURE: 98.2 F | DIASTOLIC BLOOD PRESSURE: 72 MMHG | HEART RATE: 76 BPM | RESPIRATION RATE: 16 BRPM | WEIGHT: 190 LBS | HEIGHT: 69 IN | BODY MASS INDEX: 28.14 KG/M2 | OXYGEN SATURATION: 97 %

## 2021-11-30 DIAGNOSIS — R05.8 UPPER AIRWAY COUGH SYNDROME: ICD-10-CM

## 2021-11-30 DIAGNOSIS — I10 PRIMARY HYPERTENSION: Primary | ICD-10-CM

## 2021-11-30 DIAGNOSIS — J98.8 CONGESTION OF UPPER AIRWAY: ICD-10-CM

## 2021-11-30 DIAGNOSIS — J30.89 ENVIRONMENTAL AND SEASONAL ALLERGIES: ICD-10-CM

## 2021-11-30 PROCEDURE — 99214 OFFICE O/P EST MOD 30 MIN: CPT | Performed by: STUDENT IN AN ORGANIZED HEALTH CARE EDUCATION/TRAINING PROGRAM

## 2021-11-30 RX ORDER — AZELASTINE 1 MG/ML
2 SPRAY, METERED NASAL 2 TIMES DAILY
Qty: 1 EACH | Refills: 11 | Status: SHIPPED | OUTPATIENT
Start: 2021-11-30 | End: 2022-02-24

## 2021-11-30 RX ORDER — LORATADINE 10 MG/1
10 CAPSULE, LIQUID FILLED ORAL DAILY
COMMUNITY
End: 2022-02-24

## 2021-11-30 NOTE — PROGRESS NOTES
"  Established Patient Office Visit      Subjective      Chief Complaint:  Cough and Wheezing      History of Present Illness: Kvng Li is a 71 y.o. male who presents for continued cough and upper airway noises.  Patient describes the noise as a \"wheeze\" that is worse at night.  He has been taking Mucinex and Claritin without improvement.  The symptoms started slightly greater than a month ago.  He has an associated cough/need to clear his throat and feels like he has increased mucus in his throat and a fullness sensation.  He is unable to tolerate Flonase because he has had bleeding and intranasal irritation with Flonase previously and does not want to restart this medication.    He denies any history of asthma.      Past Medical History:   Diagnosis Date   • DM2 (diabetes mellitus, type 2) ([facility])    • ED (erectile dysfunction)    • JEFE (generalized anxiety disorder)    • HLD (hyperlipidemia)    • HTN (hypertension)    • OA (osteoarthritis) of knee    • Obesity    • Right inguinal hernia        Patient Active Problem List   Diagnosis   • HTN (hypertension)   • HLD (hyperlipidemia)   • JEFE (generalized anxiety disorder)   • DM2 (diabetes mellitus, type 2) ([facility])   • Medicare annual wellness visit, subsequent   • ED (erectile dysfunction)   • Carpal tunnel syndrome, bilateral upper limbs   • Environmental and seasonal allergies         Current Outpatient Medications:   •  albuterol sulfate  (90 Base) MCG/ACT inhaler, Inhale 2 puffs Every 4 (Four) Hours As Needed for Wheezing or Shortness of Air., Disp: 18 g, Rfl: 3  •  albuterol sulfate  (90 Base) MCG/ACT inhaler, , Disp: , Rfl:   •  amLODIPine (NORVASC) 10 MG tablet, Take 1 tablet by mouth Daily., Disp: 90 tablet, Rfl: 3  •  empagliflozin (Jardiance) 25 MG tablet tablet, Take 1 tablet by mouth Daily. NEEDS AN APPT FOR REFILLS, Disp: 30 tablet, Rfl: 2  •  escitalopram (LEXAPRO) 10 MG tablet, Take 1 tablet by mouth Daily., Disp: 90 tablet, Rfl: 3  •  " "glucose blood test strip, 1 each by Other route 4 (Four) Times a Day After Meals & at Bedtime. Use as instructed, Disp: 100 each, Rfl: 12  •  Lancets (freestyle) lancets, 1 each by Other route 4 (Four) Times a Day After Meals & at Bedtime., Disp: 100 each, Rfl: 12  •  Loratadine 10 MG capsule, Take 10 mg by mouth Daily., Disp: , Rfl:   •  metFORMIN ER (GLUCOPHAGE-XR) 500 MG 24 hr tablet, Take 2 tablets by mouth 2 (Two) Times a Day With Meals., Disp: 180 tablet, Rfl: 3  •  rosuvastatin (CRESTOR) 20 MG tablet, Take 1 tablet by mouth Every Night., Disp: 90 tablet, Rfl: 3  •  sildenafil (Viagra) 25 MG tablet, Take 1 tablet by mouth Daily As Needed for Erectile Dysfunction., Disp: 30 tablet, Rfl: 6  •  azelastine (ASTELIN) 0.1 % nasal spray, 2 sprays into the nostril(s) as directed by provider 2 (Two) Times a Day. Use in each nostril as directed, Disp: 1 each, Rfl: 11      Objective     Physical Exam:   Vital Signs:   /72   Pulse 76   Temp 98.2 °F (36.8 °C) (Infrared)   Resp 16   Ht 175.3 cm (69\")   Wt 86.2 kg (190 lb)   SpO2 97%   BMI 28.06 kg/m²      Physical Exam  Constitutional:       General: He is not in acute distress.     Appearance: He is not ill-appearing.   HEENT     Erythema throughout the oropharynx and [place] arch.  TMs with normal limits.        Assessment / Plan      Assessment/Plan:   Diagnoses and all orders for this visit:    1. Primary hypertension (Primary)  -Continue amlodipine, stop lisinopril as blood pressure is controlled and rule out cause of cough although this seems less likely    2. Environmental and seasonal allergies  -     Start azelastine (ASTELIN) 0.1 % nasal spray; 2 sprays into the nostril(s) as directed by provider 2 (Two) Times a Day. Use in each nostril as directed  Dispense: 1 each; Refill: 11    3. Upper airway cough syndrome        -     Patient had auditory upper airway noises last visit but is no longer present today.  Unsure exact cause of upper airways noises " but is not responded to oral antihistamine.  This could be allergic in nature however I think patient would benefit from direct visualization with ENT.  Patient states he does not want to trial Flonase again as he had nasal irritation and bleeding previously on this medication.  -     Ambulatory Referral to ENT (Otolaryngology)  -     Start azelastine (ASTELIN) 0.1 % nasal spray; 2 sprays into the nostril(s) as directed by provider 2 (Two) Times a Day. Use in each nostril as directed  Dispense: 1 each; Refill: 11  -     Continue Mucinex twice daily and Claritin  -     Stop lisinopril    4. Congestion of upper airway  -     Ambulatory Referral to ENT (Otolaryngology)  -     azelastine (ASTELIN) 0.1 % nasal spray; 2 sprays into the nostril(s) as directed by provider 2 (Two) Times a Day. Use in each nostril as directed  Dispense: 1 each; Refill: 11      Orders Placed This Encounter   Procedures   • Ambulatory Referral to ENT (Otolaryngology)     Referral Priority:   Routine     Referral Type:   Consultation     Referral Reason:   Specialty Services Required     Requested Specialty:   Otolaryngology     Number of Visits Requested:   1         Follow Up:   Return for keep f/u appt .      Shane Ogden MD  Family Medicine - [facility]

## 2021-11-30 NOTE — PATIENT INSTRUCTIONS
Goal BP is around 130/80. If higher than this on multiple occasions please contact us for other options.     Start antihistamine nasal spray.     Stop lisinopril.

## 2021-12-29 DIAGNOSIS — E11.65 TYPE 2 DIABETES MELLITUS WITH HYPERGLYCEMIA, WITH LONG-TERM CURRENT USE OF INSULIN (HCC): ICD-10-CM

## 2021-12-29 DIAGNOSIS — Z79.4 TYPE 2 DIABETES MELLITUS WITH HYPERGLYCEMIA, WITH LONG-TERM CURRENT USE OF INSULIN (HCC): ICD-10-CM

## 2022-01-03 ENCOUNTER — TELEPHONE (OUTPATIENT)
Dept: FAMILY MEDICINE CLINIC | Facility: CLINIC | Age: 72
End: 2022-01-03

## 2022-01-03 ENCOUNTER — APPOINTMENT (OUTPATIENT)
Dept: PREADMISSION TESTING | Facility: HOSPITAL | Age: 72
End: 2022-01-03

## 2022-01-03 DIAGNOSIS — R05.3 CHRONIC COUGHING: Primary | ICD-10-CM

## 2022-01-03 NOTE — TELEPHONE ENCOUNTER
----- Message from Kvng Li sent at 1/2/2022  7:06 PM EST -----  Regarding: refferal  I'm asking for your help with getting a Refferal to a Pulmonary Dr. I have been to see an ENT, Dr Dar Scales on Dec .8th, he recommended that I see a Pulmonology Dr. I have been trying to get a refferal,since then.  This coughing condition that I have needs to get resolved, it is preveventing me from getting the Hernia Surgery that I need. Surgery had to be cancelled.                       Thank You

## 2022-01-05 ENCOUNTER — TELEPHONE (OUTPATIENT)
Dept: FAMILY MEDICINE CLINIC | Facility: CLINIC | Age: 72
End: 2022-01-05

## 2022-01-05 DIAGNOSIS — J98.8 CONGESTION OF UPPER AIRWAY: Primary | ICD-10-CM

## 2022-01-05 DIAGNOSIS — R49.0 HOARSENESS: ICD-10-CM

## 2022-01-05 DIAGNOSIS — R13.10 DYSPHAGIA, UNSPECIFIED TYPE: ICD-10-CM

## 2022-01-05 NOTE — TELEPHONE ENCOUNTER
Please call patient and let him know I can place the referral to neurology.     I will refer to speech therapy for evaluation and neurology.    Shane Ogden MD  Family Medicine - [facility]

## 2022-01-05 NOTE — TELEPHONE ENCOUNTER
Caller: Kvng Li    Relationship: Self    Best call back number: [phone]    What is the best time to reach you: ANYTIME    Who are you requesting to speak with (clinical staff, provider,  specific staff member): DR MCKEE    Do you know the name of the person who called:     What was the call regarding: REFERRAL FOR NEUROLOGY OR PULMONARY SPECIALIST THAT WAS RECOMMENDED BY DR ULLOA  (ENT)    Do you require a callback:YES

## 2022-01-05 NOTE — TELEPHONE ENCOUNTER
I know you are not on call but you were the provider who sent pt to ENT. ENT's note in chart did recommend pt to see neurology and since Dr Gould is out would you be willing to place this referral for pt?

## 2022-01-11 ENCOUNTER — OFFICE VISIT (OUTPATIENT)
Dept: PULMONOLOGY | Facility: CLINIC | Age: 72
End: 2022-01-11

## 2022-01-11 VITALS
TEMPERATURE: 97.3 F | WEIGHT: 193 LBS | BODY MASS INDEX: 28.58 KG/M2 | RESPIRATION RATE: 16 BRPM | DIASTOLIC BLOOD PRESSURE: 76 MMHG | HEART RATE: 70 BPM | SYSTOLIC BLOOD PRESSURE: 126 MMHG | HEIGHT: 69 IN | OXYGEN SATURATION: 99 %

## 2022-01-11 DIAGNOSIS — R06.2 WHEEZING: ICD-10-CM

## 2022-01-11 DIAGNOSIS — K40.90 UNILATERAL INGUINAL HERNIA WITHOUT OBSTRUCTION OR GANGRENE, RECURRENCE NOT SPECIFIED: ICD-10-CM

## 2022-01-11 DIAGNOSIS — R05.9 COUGH: Primary | ICD-10-CM

## 2022-01-11 PROCEDURE — 99204 OFFICE O/P NEW MOD 45 MIN: CPT | Performed by: INTERNAL MEDICINE

## 2022-01-11 RX ORDER — MONTELUKAST SODIUM 10 MG/1
10 TABLET ORAL NIGHTLY
Qty: 90 TABLET | Refills: 3 | Status: SHIPPED | OUTPATIENT
Start: 2022-01-11 | End: 2022-02-24

## 2022-01-11 RX ORDER — ASPIRIN 81 MG/1
TABLET ORAL
COMMUNITY
End: 2022-09-08

## 2022-01-11 RX ORDER — FLUTICASONE PROPIONATE 50 MCG
2 SPRAY, SUSPENSION (ML) NASAL DAILY
Qty: 16 G | Refills: 11 | Status: SHIPPED | OUTPATIENT
Start: 2022-01-11 | End: 2022-02-24

## 2022-01-11 NOTE — PROGRESS NOTES
New Patient Pulmonary Office Visit      Patient Name: Kvng Li    Referring Physician: Rojelio Gould MD    Chief Complaint:    Chief Complaint   Patient presents with   • Cough       History of Present Illness: Kvng Li is a 71 y.o. male who is here today to establish care with Pulmonary.  Patient has a past medical history significant diabetes mellitus type 2, hypertension, hyperlipidemia, and osteoarthritis.  He was referred to pulm evaluation chronic cough.  Initially saw ENT who noticed the patient had pooling of secretions in the posterior pharynx, they worked on thin secretions.  The cough was ongoing and had ongoing shortness of breath and wheezing therefore was referred to pulmonary for further evaluation.  Patient notes that the cough began back in October 2021.  He notes that he is having significant wheezing and feeling that he cannot clear the back of his throat.  He denies any chest pain, nausea, fever, chills.  He is a never smoker.  He tried Xyzal and albuterol without any improvement in the patient's symptoms.  He was recommended to start nasal sprays but he did not want to do it originally due to the fact that is called drying out of his nasal secretions and nosebleeds in the past.  He denies any significant weakness or neurological abnormalities.  He has no other acute complaints    Review of Systems:   Review of Systems   Constitutional: Negative for activity change, appetite change, chills and diaphoresis.   HENT: Negative for congestion, postnasal drip, sinus pressure and voice change.    Eyes: Negative for blurred vision.   Respiratory: Positive for cough. Negative for shortness of breath and wheezing.    Cardiovascular: Negative for chest pain.   Gastrointestinal: Negative for abdominal pain.   Musculoskeletal: Negative for myalgias.   Skin: Negative for color change and dry skin.   Allergic/Immunologic: Negative for environmental allergies.   Neurological: Negative for  weakness and confusion.   Hematological: Negative for adenopathy.   Psychiatric/Behavioral: Negative for sleep disturbance and depressed mood.       Past Medical History:   Past Medical History:   Diagnosis Date   • DM2 (diabetes mellitus, type 2) (HCC)    • ED (erectile dysfunction)    • JEFE (generalized anxiety disorder)    • HLD (hyperlipidemia)    • HTN (hypertension)    • OA (osteoarthritis) of knee    • Obesity    • Right inguinal hernia        Past Surgical History:   Past Surgical History:   Procedure Laterality Date   • COLONOSCOPY  2011   • KNEE ARTHROSCOPY Right 2013   • REPLACEMENT TOTAL KNEE Right 2015   • UPPER GASTROINTESTINAL ENDOSCOPY  2015       Family History:   Family History   Problem Relation Age of Onset   • Alzheimer's disease Mother    • Kidney disease Father    • Alzheimer's disease Sister    • Diabetes Sister    • Arthritis Brother    • No Known Problems Son    • Arthritis Maternal Grandmother    • Heart attack Maternal Grandmother    • Lung disease Maternal Grandfather    • Other Paternal Grandmother         Unknown   • Stroke Paternal Grandfather    • Angina Brother    • Diabetes Brother    • Other Sister         Unknown   • Diabetes Sister    • Heart disease Sister    • Hypertension Sister    • No Known Problems Son        Social History:   Social History     Socioeconomic History   • Marital status:      Spouse name: Deedee   • Number of children: 2   • Years of education: H.S.   • Highest education level: High school graduate   Tobacco Use   • Smoking status: Never Smoker   • Smokeless tobacco: Never Used   Substance and Sexual Activity   • Alcohol use: Yes     Alcohol/week: 5.0 standard drinks     Types: 5 Cans of beer per week   • Drug use: No   • Sexual activity: Yes     Partners: Female     Comment: wife       Medications:     Current Outpatient Medications:   •  albuterol sulfate  (90 Base) MCG/ACT inhaler, Inhale 2 puffs Every 4 (Four) Hours As Needed., Disp: ,  "Rfl:   •  amLODIPine (NORVASC) 10 MG tablet, Take 1 tablet by mouth Daily., Disp: 90 tablet, Rfl: 3  •  aspirin (aspirin) 81 MG EC tablet, aspirin 81 mg capsule  Take by oral route., Disp: , Rfl:   •  azelastine (ASTELIN) 0.1 % nasal spray, 2 sprays into the nostril(s) as directed by provider 2 (Two) Times a Day. Use in each nostril as directed, Disp: 1 each, Rfl: 11  •  empagliflozin (Jardiance) 25 MG tablet tablet, Take 1 tablet by mouth Daily. NEEDS AN APPT FOR REFILLS, Disp: 30 tablet, Rfl: 2  •  escitalopram (LEXAPRO) 10 MG tablet, Take 1 tablet by mouth Daily., Disp: 90 tablet, Rfl: 3  •  glucose blood test strip, 1 each by Other route 4 (Four) Times a Day After Meals & at Bedtime. Use as instructed, Disp: 100 each, Rfl: 12  •  Lancets (freestyle) lancets, 1 each by Other route 4 (Four) Times a Day After Meals & at Bedtime., Disp: 100 each, Rfl: 12  •  Loratadine 10 MG capsule, Take 10 mg by mouth Daily., Disp: , Rfl:   •  metFORMIN ER (GLUCOPHAGE-XR) 500 MG 24 hr tablet, Take 2 tablets by mouth 2 (Two) Times a Day With Meals., Disp: 180 tablet, Rfl: 3  •  rosuvastatin (CRESTOR) 20 MG tablet, Take 1 tablet by mouth Every Night., Disp: 90 tablet, Rfl: 3  •  sildenafil (Viagra) 25 MG tablet, Take 1 tablet by mouth Daily As Needed for Erectile Dysfunction., Disp: 30 tablet, Rfl: 6  •  fluticasone (Flonase) 50 MCG/ACT nasal spray, 2 sprays into the nostril(s) as directed by provider Daily., Disp: 16 g, Rfl: 11  •  montelukast (SINGULAIR) 10 MG tablet, Take 1 tablet by mouth Every Night., Disp: 90 tablet, Rfl: 3    Allergies:   No Known Allergies    Physical Exam:  Vital Signs:   Vitals:    01/11/22 0914   BP: 126/76   BP Location: Left arm   Patient Position: Sitting   Cuff Size: Adult   Pulse: 70   Resp: 16   Temp: 97.3 °F (36.3 °C)   SpO2: 99%  Comment: room air at rest   Weight: 87.5 kg (193 lb)   Height: 175.3 cm (69\")       Physical Exam  Vitals and nursing note reviewed.   Constitutional:       General: He " is not in acute distress.     Appearance: He is well-developed and normal weight. He is not ill-appearing or toxic-appearing.   Cardiovascular:      Rate and Rhythm: Normal rate and regular rhythm.      Pulses: Normal pulses.      Heart sounds: Normal heart sounds. No murmur heard.  No gallop.    Pulmonary:      Effort: Pulmonary effort is normal.      Breath sounds: Wheezing present. No rhonchi or rales.   Musculoskeletal:      Right lower leg: No edema.      Left lower leg: No edema.   Neurological:      Mental Status: He is alert.         Immunization History   Administered Date(s) Administered   • COVID-19 (PFIZER) 02/17/2021, 03/10/2021, 01/04/2022   • Fluad Quad 65+ 10/01/2020   • Fluzone High Dose =>65 Years (Vaxcare ONLY) 02/20/2019, 10/28/2019   • Fluzone High-Dose 65+yrs 10/25/2021   • Pneumococcal Conjugate 13-Valent (PCV13) 02/08/2017, 02/08/2017   • Pneumococcal Polysaccharide (PPSV23) 08/07/2018   • Td 10/01/2020   • Tdap 08/03/2006       Results Review:   - I personally reviewed the pts imaging from chest x-ray 10/26/2021 showed no acute cardiopulmonary process.  - I personally reviewed the pts chart with regards to evaluation by ENT in addition to his primary care physician    Assessment / Plan:   1. Cough (Primary)  2. Wheezing  -I explained to him the 3 most common causes of cough are asthma, allergies, and reflux.  In addition to this I would recommend initial testing to determine if there is any signs of underlying interstitial lung disease.  Additional chest x-ray though was normal.  -I will start the patient on Flonase 2 puffs per nostril nightly x6 weeks, Singulair 10 mg nightly x6 weeks, a second generation antihistamine (loratidine, cetirizine, fexofenadine) daily x2 weeks, a decongestant (pseudoephedrine) x2 weeks, a first generation antihistamine (Benadryl) nightly x2 weeks, and Afrin (oxymetazoline) 2 to 3 sprays per nostril twice daily x48 hours.  I discussed with the patient the risk  and benefits of these medications including but not limited to urinary retention, sleep disturbances, drowsiness and rebound rhinitis.  The patient verbalized understanding.  -Patient was instructed to utilize nasal saline to help with the nosebleeds and drying out the nasal passages while using the Flonase.  -To further evaluate the patient's wheezing and shortness of breath as well we will get a full set of PFTs and a CT of the chest without contrast prior to the next visit.  -If there is any signs of neuromuscular disease as discussed by ENT I would expect to see some changes in the PFTs as he has no other signs of weakness.     3. Unilateral inguinal hernia without obstruction or gangrene, recurrence not specified  -Patient is planning to have an inguinal hernia surgery, but they want to have the cough resolved prior to doing the surgery as it could obviously disrupt the procedure.  I informed him that we could only do that with either identifying the underlying cause of the cough and treating it or potentially utilizing codeine to help suppress the cough at least for a few weeks.  We will try to treat the cough as noted above first but if this is not possible and he wants to proceed with codeine to undergo the surgery I would be okay with that.  Dr. Yañez is his surgeon.    Follow Up:   Return in about 6 weeks (around 2/22/2022) for CT Chest with next visit, PFT's with next follow up visit.     GAUDENCIO King, DO  Pulmonary and Critical Care Medicine  Note Electronically Signed    Part of this note may be an electronic transcription/translation of spoken language to printed text using the Dragon Dictation System.

## 2022-01-12 DIAGNOSIS — R06.02 SHORTNESS OF BREATH: Primary | ICD-10-CM

## 2022-01-26 DIAGNOSIS — I10 ESSENTIAL HYPERTENSION: ICD-10-CM

## 2022-01-26 RX ORDER — AMLODIPINE BESYLATE 10 MG/1
10 TABLET ORAL DAILY
Qty: 90 TABLET | Refills: 3 | Status: SHIPPED | OUTPATIENT
Start: 2022-01-26 | End: 2022-05-05 | Stop reason: SDUPTHER

## 2022-02-07 ENCOUNTER — HOSPITAL ENCOUNTER (OUTPATIENT)
Dept: CT IMAGING | Facility: HOSPITAL | Age: 72
Discharge: HOME | End: 2022-02-07
Admitting: NURSE PRACTITIONER

## 2022-02-07 DIAGNOSIS — R06.02 SHORTNESS OF BREATH: ICD-10-CM

## 2022-02-07 PROCEDURE — 71250 CT THORAX DX C-: CPT

## 2022-02-15 DIAGNOSIS — Z79.4 TYPE 2 DIABETES MELLITUS WITHOUT COMPLICATION, WITH LONG-TERM CURRENT USE OF INSULIN: ICD-10-CM

## 2022-02-15 DIAGNOSIS — E11.9 TYPE 2 DIABETES MELLITUS WITHOUT COMPLICATION, WITH LONG-TERM CURRENT USE OF INSULIN: ICD-10-CM

## 2022-02-15 RX ORDER — EMPAGLIFLOZIN 25 MG/1
TABLET, FILM COATED ORAL
Qty: 30 TABLET | Refills: 2 | Status: SHIPPED | OUTPATIENT
Start: 2022-02-15 | End: 2022-03-08 | Stop reason: SDUPTHER

## 2022-02-21 DIAGNOSIS — Z01.812 BLOOD TESTS PRIOR TO TREATMENT OR PROCEDURE: Primary | ICD-10-CM

## 2022-02-22 ENCOUNTER — CLINICAL SUPPORT NO REQUIREMENTS (OUTPATIENT)
Dept: PULMONOLOGY | Facility: CLINIC | Age: 72
End: 2022-02-22

## 2022-02-22 DIAGNOSIS — Z01.812 BLOOD TESTS PRIOR TO TREATMENT OR PROCEDURE: ICD-10-CM

## 2022-02-22 PROCEDURE — 99211 OFF/OP EST MAY X REQ PHY/QHP: CPT | Performed by: INTERNAL MEDICINE

## 2022-02-22 PROCEDURE — U0004 COV-19 TEST NON-CDC HGH THRU: HCPCS | Performed by: INTERNAL MEDICINE

## 2022-02-23 LAB — SARS-COV-2 RNA NOSE QL NAA+PROBE: NOT DETECTED

## 2022-02-24 ENCOUNTER — OFFICE VISIT (OUTPATIENT)
Dept: PULMONOLOGY | Facility: CLINIC | Age: 72
End: 2022-02-24

## 2022-02-24 VITALS
HEART RATE: 69 BPM | TEMPERATURE: 97.8 F | WEIGHT: 190.13 LBS | RESPIRATION RATE: 16 BRPM | BODY MASS INDEX: 28.16 KG/M2 | OXYGEN SATURATION: 99 % | DIASTOLIC BLOOD PRESSURE: 66 MMHG | HEIGHT: 69 IN | SYSTOLIC BLOOD PRESSURE: 112 MMHG

## 2022-02-24 DIAGNOSIS — K40.90 UNILATERAL INGUINAL HERNIA WITHOUT OBSTRUCTION OR GANGRENE, RECURRENCE NOT SPECIFIED: ICD-10-CM

## 2022-02-24 DIAGNOSIS — R05.9 COUGH: Primary | ICD-10-CM

## 2022-02-24 DIAGNOSIS — K21.9 GERD WITHOUT ESOPHAGITIS: ICD-10-CM

## 2022-02-24 PROCEDURE — 94729 DIFFUSING CAPACITY: CPT | Performed by: INTERNAL MEDICINE

## 2022-02-24 PROCEDURE — 94010 BREATHING CAPACITY TEST: CPT | Performed by: INTERNAL MEDICINE

## 2022-02-24 PROCEDURE — 99214 OFFICE O/P EST MOD 30 MIN: CPT | Performed by: INTERNAL MEDICINE

## 2022-02-24 PROCEDURE — 94726 PLETHYSMOGRAPHY LUNG VOLUMES: CPT | Performed by: INTERNAL MEDICINE

## 2022-02-24 RX ORDER — PROMETHAZINE HYDROCHLORIDE AND CODEINE PHOSPHATE 6.25; 1 MG/5ML; MG/5ML
5 SOLUTION ORAL EVERY 4 HOURS PRN
Qty: 240 ML | Refills: 0 | Status: SHIPPED | OUTPATIENT
Start: 2022-02-24 | End: 2022-09-19

## 2022-02-24 RX ORDER — PANTOPRAZOLE SODIUM 40 MG/1
40 TABLET, DELAYED RELEASE ORAL DAILY
Qty: 90 TABLET | Refills: 3 | Status: SHIPPED | OUTPATIENT
Start: 2022-02-24 | End: 2022-09-08

## 2022-02-24 NOTE — PROGRESS NOTES
Follow Up Office Note       Patient Name: Kvng Li    Referring Physician: No ref. provider found    Chief Complaint:    Chief Complaint   Patient presents with   • Post CT     f/u       History of Present Illness: Kvng Li is a 71 y.o. male who is here today to follow-up care with Pulmonary.   Patient has a past medical history significant diabetes mellitus type 2, hypertension, hyperlipidemia, and osteoarthritis.  He was referred to pulm evaluation chronic cough.  Initially saw ENT who noticed the patient had pooling of secretions in the posterior pharynx, they worked on thin secretions.  The cough was ongoing and had ongoing shortness of breath and wheezing therefore was referred to pulmonary for further evaluation.  He is back today for follow-up.  At the last visit testing was recommended in addition to trialing the empiric allergic rhinitis regimen.  He states the cough has not changed much.  Denies any chest pain, nausea, fever, or chills.  Still having difficulties with his hernia is hoping to be to get his hernia repaired.    Review of Systems:   Review of Systems   Constitutional: Negative for chills, fatigue and fever.   HENT: Negative for congestion and voice change.    Eyes: Negative for blurred vision.   Respiratory: Positive for cough. Negative for shortness of breath and wheezing.    Cardiovascular: Negative for chest pain.   Skin: Negative for dry skin.   Hematological: Negative for adenopathy.   Psychiatric/Behavioral: Negative for agitation and depressed mood.       The following portions of the patient's history were reviewed and updated as appropriate: allergies, current medications, past family history, past medical history, past social history, past surgical history and problem list.    Physical Exam:  Vital Signs:   Vitals:    02/24/22 1058   BP: 112/66   BP Location: Right arm   Patient Position: Sitting   Cuff Size: Adult   Pulse: 69   Resp: 16   Temp: 97.8 °F (36.6 °C)  "  SpO2: 99%  Comment: room air at rest   Weight: 86.2 kg (190 lb 2 oz)   Height: 175.3 cm (69\")       Physical Exam  Vitals and nursing note reviewed.   Constitutional:       General: He is not in acute distress.     Appearance: He is well-developed and normal weight. He is not ill-appearing or toxic-appearing.   Cardiovascular:      Rate and Rhythm: Normal rate and regular rhythm.      Pulses: Normal pulses.      Heart sounds: Normal heart sounds. No murmur heard.  No gallop.    Pulmonary:      Effort: Pulmonary effort is normal.      Breath sounds: Normal breath sounds. No wheezing, rhonchi or rales.   Musculoskeletal:      Right lower leg: No edema.      Left lower leg: No edema.   Neurological:      Mental Status: He is alert.         Immunization History   Administered Date(s) Administered   • COVID-19 (PFIZER) PURPLE CAP 02/17/2021, 03/10/2021, 01/04/2022   • Fluad Quad 65+ 10/01/2020   • Fluzone High Dose =>65 Years ([employer]axcare ONLY) 02/20/2019, 10/28/2019   • Fluzone High-Dose 65+yrs 10/25/2021   • Pneumococcal Conjugate 13-Valent (PCV13) 02/08/2017, 02/08/2017   • Pneumococcal Polysaccharide (PPSV23) 08/07/2018   • Td 10/01/2020   • Tdap 08/03/2006       Results Review:   - I personally reviewed the pts imaging from CT scan of the chest 2/7/2022 which showed no acute process, there were some subpleural 3 mm nodules noted in the right upper lobe as well as the left upper lobe.    - chest x-ray 10/26/2021 showed no acute cardiopulmonary process.  - I personally reviewed the pts PFTs from 2/24/2022 which showed no signs of obstruction or restriction with a normal DLCO.    Assessment / Plan:   1. Cough (Primary)  2. GERD without esophagitis  -So far personal drip regimen did not provide any benefit for the patient's cough, and his pulmonary function testing are entirely normal.  Therefore I will try to treat reflux as a possible underlying cause of the patient's cough.  -I will go ahead and start the patient on " Protonix 40 mg once daily.  I instructed the patient to take it on an empty stomach 30 minutes prior to eating.  Patient was also given education sheet on dietary and lifestyle modifications.  I will have the patient implement the instructions for the next 6 weeks.  If the symptoms do not resolve, then we will consider further testing with a esophageal pH probe study, modified barium swallow and possible GI evaluation.  -I did prescribe him Phenergan with codeine to help with cough suppression we are continuing to work on finding the underlying reason for the cough.  -I have also ordered a methacholine challenge to be done at the next visit if he is continuing to cough and the reflux treatment does not improve his cough.  -I have discontinued the Singulair and the Flonase.  As he has not had any benefit.    3. Unilateral inguinal hernia without obstruction or gangrene, recurrence not specified  -We discussed the need for inguinal hernia surgery, I informed him that it would be up to Dr. Smith to decide if he is okay to do it on the cough that we have done.  I have given him cough suppression medication that should help with easing the cough and then obviously when he is post surgery he would have codeine-based pain medications which also may help with the cough.  But unfortunately I cannot make this process go any quicker as he did not make any quick turnaround with the postnasal drip regimen.  I will continue the testing and treatment as noted above.      Follow Up:   Return in about 2 months (around 4/24/2022) for group home.       GAUDENCIO King, DO  Pulmonary and Critical Care Medicine  Note Electronically Signed    Part of this note may be an electronic transcription/translation of spoken language to printed text using the Dragon Dictation System.

## 2022-02-25 DIAGNOSIS — R05.9 COUGH: Primary | ICD-10-CM

## 2022-03-02 ENCOUNTER — TRANSCRIBE ORDERS (OUTPATIENT)
Dept: ADMINISTRATIVE | Facility: HOSPITAL | Age: 72
End: 2022-03-02

## 2022-03-02 DIAGNOSIS — K40.90 UNILATERAL INGUINAL HERNIA WITHOUT OBSTRUCTION OR GANGRENE, RECURRENCE NOT SPECIFIED: Primary | ICD-10-CM

## 2022-03-08 ENCOUNTER — OFFICE VISIT (OUTPATIENT)
Dept: FAMILY MEDICINE CLINIC | Facility: CLINIC | Age: 72
End: 2022-03-08

## 2022-03-08 ENCOUNTER — LAB (OUTPATIENT)
Dept: LAB | Facility: HOSPITAL | Age: 72
End: 2022-03-08

## 2022-03-08 VITALS
BODY MASS INDEX: 27.7 KG/M2 | TEMPERATURE: 98.9 F | HEART RATE: 79 BPM | WEIGHT: 187 LBS | SYSTOLIC BLOOD PRESSURE: 116 MMHG | HEIGHT: 69 IN | DIASTOLIC BLOOD PRESSURE: 60 MMHG | OXYGEN SATURATION: 98 %

## 2022-03-08 DIAGNOSIS — Z12.11 SCREENING FOR COLON CANCER: ICD-10-CM

## 2022-03-08 DIAGNOSIS — E11.9 TYPE 2 DIABETES MELLITUS WITHOUT COMPLICATION, WITH LONG-TERM CURRENT USE OF INSULIN: ICD-10-CM

## 2022-03-08 DIAGNOSIS — Z00.00 MEDICARE ANNUAL WELLNESS VISIT, SUBSEQUENT: Primary | ICD-10-CM

## 2022-03-08 DIAGNOSIS — Z12.5 ENCOUNTER FOR SCREENING FOR MALIGNANT NEOPLASM OF PROSTATE: ICD-10-CM

## 2022-03-08 DIAGNOSIS — Z79.4 TYPE 2 DIABETES MELLITUS WITHOUT COMPLICATION, WITH LONG-TERM CURRENT USE OF INSULIN: ICD-10-CM

## 2022-03-08 LAB
ALBUMIN SERPL-MCNC: 4.6 G/DL (ref 3.5–5.2)
ALBUMIN UR-MCNC: <1.2 MG/DL
ALBUMIN/GLOB SERPL: 1.8 G/DL
ALP SERPL-CCNC: 41 U/L (ref 39–117)
ALT SERPL W P-5'-P-CCNC: 9 U/L (ref 1–41)
ANION GAP SERPL CALCULATED.3IONS-SCNC: 10.1 MMOL/L (ref 5–15)
AST SERPL-CCNC: 14 U/L (ref 1–40)
BASOPHILS # BLD AUTO: 0.02 10*3/MM3 (ref 0–0.2)
BASOPHILS NFR BLD AUTO: 0.2 % (ref 0–1.5)
BILIRUB SERPL-MCNC: 0.5 MG/DL (ref 0–1.2)
BUN SERPL-MCNC: 21 MG/DL (ref 8–23)
BUN/CREAT SERPL: 23.3 (ref 7–25)
CALCIUM SPEC-SCNC: 9.6 MG/DL (ref 8.6–10.5)
CHLORIDE SERPL-SCNC: 107 MMOL/L (ref 98–107)
CHOLEST SERPL-MCNC: 200 MG/DL (ref 0–200)
CO2 SERPL-SCNC: 27.9 MMOL/L (ref 22–29)
CREAT SERPL-MCNC: 0.9 MG/DL (ref 0.76–1.27)
DEPRECATED RDW RBC AUTO: 45.2 FL (ref 37–54)
EGFRCR SERPLBLD CKD-EPI 2021: 90.7 ML/MIN/1.73
EOSINOPHIL # BLD AUTO: 1.04 10*3/MM3 (ref 0–0.4)
EOSINOPHIL NFR BLD AUTO: 12.4 % (ref 0.3–6.2)
ERYTHROCYTE [DISTWIDTH] IN BLOOD BY AUTOMATED COUNT: 13.5 % (ref 12.3–15.4)
GLOBULIN UR ELPH-MCNC: 2.6 GM/DL
GLUCOSE SERPL-MCNC: 115 MG/DL (ref 65–99)
HBA1C MFR BLD: 6.7 % (ref 4.8–5.6)
HCT VFR BLD AUTO: 49.3 % (ref 37.5–51)
HDLC SERPL-MCNC: 47 MG/DL (ref 40–60)
HGB BLD-MCNC: 16.4 G/DL (ref 13–17.7)
IMM GRANULOCYTES # BLD AUTO: 0.02 10*3/MM3 (ref 0–0.05)
IMM GRANULOCYTES NFR BLD AUTO: 0.2 % (ref 0–0.5)
LDLC SERPL CALC-MCNC: 142 MG/DL (ref 0–100)
LDLC/HDLC SERPL: 2.99 {RATIO}
LYMPHOCYTES # BLD AUTO: 2.09 10*3/MM3 (ref 0.7–3.1)
LYMPHOCYTES NFR BLD AUTO: 25 % (ref 19.6–45.3)
MCH RBC QN AUTO: 30.3 PG (ref 26.6–33)
MCHC RBC AUTO-ENTMCNC: 33.3 G/DL (ref 31.5–35.7)
MCV RBC AUTO: 91.1 FL (ref 79–97)
MONOCYTES # BLD AUTO: 0.57 10*3/MM3 (ref 0.1–0.9)
MONOCYTES NFR BLD AUTO: 6.8 % (ref 5–12)
NEUTROPHILS NFR BLD AUTO: 4.63 10*3/MM3 (ref 1.7–7)
NEUTROPHILS NFR BLD AUTO: 55.4 % (ref 42.7–76)
NRBC BLD AUTO-RTO: 0 /100 WBC (ref 0–0.2)
PLATELET # BLD AUTO: 227 10*3/MM3 (ref 140–450)
PMV BLD AUTO: 10.4 FL (ref 6–12)
POTASSIUM SERPL-SCNC: 4.6 MMOL/L (ref 3.5–5.2)
PROT SERPL-MCNC: 7.2 G/DL (ref 6–8.5)
PSA SERPL-MCNC: 0.65 NG/ML (ref 0–4)
RBC # BLD AUTO: 5.41 10*6/MM3 (ref 4.14–5.8)
SODIUM SERPL-SCNC: 145 MMOL/L (ref 136–145)
TRIGL SERPL-MCNC: 63 MG/DL (ref 0–150)
VLDLC SERPL-MCNC: 11 MG/DL (ref 5–40)
WBC NRBC COR # BLD: 8.37 10*3/MM3 (ref 3.4–10.8)

## 2022-03-08 PROCEDURE — G0103 PSA SCREENING: HCPCS | Performed by: FAMILY MEDICINE

## 2022-03-08 PROCEDURE — G0439 PPPS, SUBSEQ VISIT: HCPCS | Performed by: FAMILY MEDICINE

## 2022-03-08 PROCEDURE — 96160 PT-FOCUSED HLTH RISK ASSMT: CPT | Performed by: FAMILY MEDICINE

## 2022-03-08 PROCEDURE — 1159F MED LIST DOCD IN RCRD: CPT | Performed by: FAMILY MEDICINE

## 2022-03-08 PROCEDURE — 82043 UR ALBUMIN QUANTITATIVE: CPT | Performed by: FAMILY MEDICINE

## 2022-03-08 PROCEDURE — 85025 COMPLETE CBC W/AUTO DIFF WBC: CPT | Performed by: FAMILY MEDICINE

## 2022-03-08 PROCEDURE — 1170F FXNL STATUS ASSESSED: CPT | Performed by: FAMILY MEDICINE

## 2022-03-08 PROCEDURE — 1126F AMNT PAIN NOTED NONE PRSNT: CPT | Performed by: FAMILY MEDICINE

## 2022-03-08 PROCEDURE — 80053 COMPREHEN METABOLIC PANEL: CPT | Performed by: FAMILY MEDICINE

## 2022-03-08 PROCEDURE — 83036 HEMOGLOBIN GLYCOSYLATED A1C: CPT | Performed by: FAMILY MEDICINE

## 2022-03-08 PROCEDURE — 80061 LIPID PANEL: CPT | Performed by: FAMILY MEDICINE

## 2022-03-08 NOTE — PROGRESS NOTES
The ABCs of the Annual Wellness Visit  Subsequent Medicare Wellness Visit    Chief Complaint   Patient presents with   • Medicare Wellness-subsequent      Subjective    History of Present Illness:  Kvng Li is a 71 y.o. male who presents for a Subsequent Medicare Wellness Visit.    The following portions of the patient's history were reviewed and   updated as appropriate: past family history, past medical history, past social history, past surgical history and problem list.    Compared to one year ago, the patient feels his physical   health is worse. Patient states she has a chronic cough and Pulmonology is following him for it. He also has a left inguinal hernia and surgery is scheduled April 13th    Compared to one year ago, the patient feels his mental   health is the same.    Recent Hospitalizations:  He was not admitted to the hospital during the last year.       Current Medical Providers:  Patient Care Team:  Rojelio Gould MD as PCP - General (Family Medicine)  Nayan King DO as Consulting Physician (Pulmonary Disease)  Kush Saunders MD as Consulting Physician (General Surgery)  Yanick Tello MD as Consulting Physician (Orthopedic Surgery)    Outpatient Medications Prior to Visit   Medication Sig Dispense Refill   • amLODIPine (NORVASC) 10 MG tablet TAKE 1 TABLET BY MOUTH DAILY. 90 tablet 3   • aspirin 81 MG EC tablet aspirin 81 mg capsule   Take by oral route.     • escitalopram (LEXAPRO) 10 MG tablet Take 1 tablet by mouth Daily. 90 tablet 3   • glucose blood test strip 1 each by Other route 4 (Four) Times a Day After Meals & at Bedtime. Use as instructed 100 each 12   • Lancets (freestyle) lancets 1 each by Other route 4 (Four) Times a Day After Meals & at Bedtime. 100 each 12   • metFORMIN ER (GLUCOPHAGE-XR) 500 MG 24 hr tablet Take 2 tablets by mouth 2 (Two) Times a Day With Meals. 180 tablet 3   • pantoprazole (PROTONIX) 40 MG EC tablet Take 1 tablet by  mouth Daily for 360 days. 90 tablet 3   • promethazine-codeine (PHENERGAN with CODEINE) 6.25-10 MG/5ML solution Take 5 mL by mouth Every 4 (Four) Hours As Needed for Cough. 240 mL 0   • sildenafil (Viagra) 25 MG tablet Take 1 tablet by mouth Daily As Needed for Erectile Dysfunction. 30 tablet 6   • Jardiance 25 MG tablet tablet TAKE 1 TABLET BY MOUTH DAILY. NEEDS AN APPT FOR REFILLS 30 tablet 2   • rosuvastatin (CRESTOR) 20 MG tablet Take 1 tablet by mouth Every Night. 90 tablet 3     No facility-administered medications prior to visit.       No opioid medication identified on active medication list. I have reviewed chart for other potential  high risk medication/s and harmful drug interactions in the elderly.          Aspirin is on active medication list. Aspirin use is indicated based on review of current medical condition/s. Pros and cons of this therapy have been discussed today. Benefits of this medication outweigh potential harm.  Patient has been encouraged to continue taking this medication.  .      Patient Active Problem List   Diagnosis   • HTN (hypertension)   • HLD (hyperlipidemia)   • JEFE (generalized anxiety disorder)   • DM2 (diabetes mellitus, type 2) ([facility])   • Medicare annual wellness visit, subsequent   • ED (erectile dysfunction)   • Carpal tunnel syndrome, bilateral upper limbs   • Environmental and seasonal allergies     Advance Care Planning  Advance Directive is not on file.  ACP discussion was declined by the patient. Patient does not have an advance directive, information provided.    Review of Systems   Constitutional: Negative for chills and fever.   Eyes: Negative for visual disturbance.   Respiratory: Positive for cough (chronic) and wheezing. Negative for choking, shortness of breath and stridor.    Cardiovascular: Negative for chest pain.   Gastrointestinal: Negative for abdominal pain, blood in stool, constipation, diarrhea, nausea and vomiting.   Genitourinary: Negative for dysuria.  "  Neurological: Negative for syncope and numbness.   Psychiatric/Behavioral: Negative for suicidal ideas. The patient is not nervous/anxious.         Objective    Vitals:    03/08/22 0920   BP: 116/60   Pulse: 79   Temp: 98.9 °F (37.2 °C)   SpO2: 98%   Weight: 84.8 kg (187 lb)   Height: 175.3 cm (69\")   PainSc: 0-No pain     BMI Readings from Last 1 Encounters:   03/08/22 27.62 kg/m²   BMI is above normal parameters. Recommendations include: exercise counseling and nutrition counseling    Does the patient have evidence of cognitive impairment? No    Physical Exam  Constitutional:       Appearance: Normal appearance.   HENT:      Right Ear: Tympanic membrane, ear canal and external ear normal. There is no impacted cerumen (large amount of cerumen).      Left Ear: Tympanic membrane, ear canal and external ear normal. There is no impacted cerumen.      Mouth/Throat:      Mouth: Mucous membranes are moist.      Pharynx: Oropharynx is clear. No oropharyngeal exudate.   Eyes:      Extraocular Movements: Extraocular movements intact.   Cardiovascular:      Rate and Rhythm: Normal rate and regular rhythm.      Heart sounds: Normal heart sounds. No murmur heard.    No gallop.   Pulmonary:      Effort: Pulmonary effort is normal. No respiratory distress.      Breath sounds: No stridor. Wheezing (bilateral inspiratory wheezes) present. No rhonchi or rales.   Chest:      Chest wall: No tenderness.   Abdominal:      General: Bowel sounds are normal. There is no distension.      Palpations: Abdomen is soft. There is no mass.      Tenderness: There is no abdominal tenderness. There is no guarding or rebound.      Hernia: A hernia (known hernia not examined today) is present.   Musculoskeletal:         General: Normal range of motion.   Feet:      Right foot:      Protective Sensation: 10 sites tested. 10 sites sensed.      Skin integrity: No ulcer, skin breakdown or erythema.      Toenail Condition: Right toenails are normal.      " Left foot:      Protective Sensation: 10 sites tested. 10 sites sensed.      Skin integrity: No ulcer, skin breakdown or erythema.      Toenail Condition: Left toenails are normal.   Lymphadenopathy:      Cervical: No cervical adenopathy.   Skin:     General: Skin is warm and dry.   Neurological:      Mental Status: He is alert and oriented to person, place, and time.   Psychiatric:         Mood and Affect: Mood normal.         Behavior: Behavior normal.             HEALTH RISK ASSESSMENT    Smoking Status:  Social History     Tobacco Use   Smoking Status Never Smoker   Smokeless Tobacco Never Used     Alcohol Consumption:  Social History     Substance and Sexual Activity   Alcohol Use Yes   • Alcohol/week: 5.0 standard drinks   • Types: 5 Cans of beer per week     Fall Risk Screen:    [facility] Fall Risk Assessment was completed, and patient is at LOW risk for falls.Assessment completed on:3/8/2022    Depression Screening:  PHQ-2/PHQ-9 Depression Screening 3/8/2022   Little Interest or Pleasure in Doing Things 0-->not at all   Feeling Down, Depressed or Hopeless 0-->not at all   PHQ-9: Brief Depression Severity Measure Score 0       Health Habits and Functional and Cognitive Screening:  Functional & Cognitive Status 3/8/2022   Do you have difficulty preparing food and eating? No   Do you have difficulty bathing yourself, getting dressed or grooming yourself? No   Do you have difficulty using the toilet? No   Do you have difficulty moving around from place to place? No   Do you have trouble with steps or getting out of a bed or a chair? No   Current Diet Well Balanced Diet   Dental Exam Not up to date        Dental Exam Comment -   Eye Exam Up to date   Exercise (times per week) 4 times per week   Current Exercises Include Walking   Current Exercise Activities Include -   Do you need help using the phone?  No   Are you deaf or do you have serious difficulty hearing?  No   Do you need help with transportation? No   Do  you need help shopping? No   Do you need help preparing meals?  No   Do you need help with housework?  No   Do you need help with laundry? No   Do you need help taking your medications? No   Do you need help managing money? No   Do you ever drive or ride in a car without wearing a seat belt? No   Have you felt unusual stress, anger or loneliness in the last month? No   Who do you live with? Spouse   If you need help, do you have trouble finding someone available to you? No   Have you been bothered in the last four weeks by sexual problems? Yes   Do you have difficulty concentrating, remembering or making decisions? No       Age-appropriate Screening Schedule:  Refer to the list below for future screening recommendations based on patient's age, sex and/or medical conditions. Orders for these recommended tests are listed in the plan section. The patient has been provided with a written plan.    Health Maintenance   Topic Date Due   • URINE MICROALBUMIN  11/18/2020   • LIPID PANEL  02/23/2022   • HEMOGLOBIN A1C  03/08/2022   • DIABETIC EYE EXAM  04/26/2022 (Originally 7/1/2021)   • DIABETIC FOOT EXAM  03/08/2023   • INFLUENZA VACCINE  Completed   • TDAP/TD VACCINES  Discontinued   • ZOSTER VACCINE  Discontinued              Assessment/Plan   CMS Preventative Services Quick Reference  Risk Factors Identified During Encounter  Cardiovascular Disease  Depression/Dysphoria  Immunizations Discussed/Encouraged (specific Immunizations; Tdap and Shingrix  The above risks/problems have been discussed with the patient.  Follow up actions/plans if indicated are seen below in the Assessment/Plan Section.  Pertinent information has been shared with the patient in the After Visit Summary.    Diagnoses and all orders for this visit:    1. Medicare annual wellness visit, subsequent (Primary)  Assessment & Plan:  The patient is here for health maintenance visit.  Currently, the patient consumes a healthy diet and has an adequate  exercise regimen.  Screening lab work is ordered.  Immunizations were reviewed today.  Advice and education was given regarding nutrition, aerobic exercise, routine dental evaluations, routine eye exams, reproductive health, cardiovascular risk reduction, sunscreen use, self skin examination (annual dermatology evaluations) and seatbelt use (general overall safety).  Further recommendations will be given if needed after lab evaluation.  Annual wellness evaluation is recommended.      Orders:  -     CBC & Differential  -     Comprehensive Metabolic Panel  -     Hemoglobin A1c  -     Lipid Panel  -     PSA Screen  -     MicroAlbumin, Urine, Random - Urine, Clean Catch    2. Type 2 diabetes mellitus without complication, with long-term current use of insulin (HCC)  -     empagliflozin (Jardiance) 25 MG tablet tablet; Take 1 tablet by mouth Daily.  Dispense: 90 tablet; Refill: 3  -     CBC & Differential  -     Comprehensive Metabolic Panel  -     Hemoglobin A1c  -     Lipid Panel  -     MicroAlbumin, Urine, Random - Urine, Clean Catch    3. Encounter for screening for malignant neoplasm of prostate  -     PSA Screen    4. Screening for colon cancer  -     Cologuard - Stool, Per Rectum; Future      Follow Up:   Return in about 6 months (around 9/8/2022) for Follow-up DM, anxiety.     An After Visit Summary and PPPS were made available to the patient.    I attest that I have reviewed the student note and that the components of the history of the present illness, the physical exam, and the assessment and plan documented were performed by me or were performed in my presence by the student and verified by me.

## 2022-03-08 NOTE — PATIENT INSTRUCTIONS
Advance Care Planning and Advance Directives     You make decisions on a daily basis - decisions about where you want to live, your career, your home, your life. Perhaps one of the most important decisions you face is your choice for future medical care. Take time to talk with your family and your healthcare team and start planning today.  Advance Care Planning is a process that can help you:  Understand possible future healthcare decisions in light of your own experiences  Reflect on those decision in light of your goals and values  Discuss your decisions with those closest to you and the healthcare professionals that care for you  Make a plan by creating a document that reflects your wishes    Surrogate Decision Maker  In the event of a medical emergency, which has left you unable to communicate or to make your own decisions, you would need someone to make decisions for you.  It is important to discuss your preferences for medical treatment with this person while you are in good health.     Qualities of a surrogate decision maker:  Willing to take on this role and responsibility  Knows what you want for future medical care  Willing to follow your wishes even if they don't agree with them  Able to make difficult medical decisions under stressful circumstances    Advance Directives  These are legal documents you can create that will guide your healthcare team and decision maker(s) when needed. These documents can be stored in the electronic medical record.    Living Will - a legal document to guide your care if you have a terminal condition or a serious illness and are unable to communicate. States vary by statute in document names/types, but most forms may include one or more of the following:        -  Directions regarding life-prolonging treatments        -  Directions regarding artificially provided nutrition/hydration        -  Choosing a healthcare decision maker        -  Direction regarding organ/tissue  donation    Durable Power of  for Healthcare - this document names an -in-fact to make medical decisions for you, but it may also allow this person to make personal and financial decisions for you. Please seek the advice of an  if you need this type of document.    **Advance Directives are not required and no one may discriminate against you if you do not sign one.    Medical Orders  Many states allow specific forms/orders signed by your physician to record your wishes for medical treatment in your current state of health. This form, signed in personal communication with your physician, addresses resuscitation and other medical interventions that you may or may not want.      For more information or to schedule a time with a [facility] Advance Care Planning Facilitator contact: [employer]/[facility] or call [phone] and someone will contact you directly.  Medicare Wellness  Personal Prevention Plan of Service     Date of Office Visit:    Encounter Provider:  Rojelio Gould MD  Place of Service:  [facility]  Patient Name: Kvng Li  :  1950    As part of the Medicare Wellness portion of your visit today, we are providing you with this personalized preventive plan of services (PPPS). This plan is based upon recommendations of the [place] Preventive Services Task Force (USPSTF) and the Advisory Committee on Immunization Practices (ACIP).    This lists the preventive care services that should be considered, and provides dates of when you are due. Items listed as completed are up-to-date and do not require any further intervention.    Health Maintenance   Topic Date Due    URINE MICROALBUMIN  2020    DIABETIC EYE EXAM  2021    COLORECTAL CANCER SCREENING  2021    ANNUAL WELLNESS VISIT  2022    LIPID PANEL  2022    HEMOGLOBIN A1C  2022    DIABETIC FOOT EXAM  2023    HEPATITIS C SCREENING   Completed    COVID-19 Vaccine  Completed    INFLUENZA VACCINE  Completed    Pneumococcal Vaccine 65+  Completed    AAA SCREEN (ONE-TIME)  Completed    TDAP/TD VACCINES  Discontinued    ZOSTER VACCINE  Discontinued       No orders of the defined types were placed in this encounter.      Return in about 6 months (around 9/8/2022) for Follow-up DM, anxiety.

## 2022-03-11 ENCOUNTER — HOSPITAL ENCOUNTER (OUTPATIENT)
Dept: ULTRASOUND IMAGING | Facility: HOSPITAL | Age: 72
Discharge: HOME | End: 2022-03-11
Admitting: SURGERY

## 2022-03-11 DIAGNOSIS — K40.90 UNILATERAL INGUINAL HERNIA WITHOUT OBSTRUCTION OR GANGRENE, RECURRENCE NOT SPECIFIED: ICD-10-CM

## 2022-03-11 PROCEDURE — 76882 US LMTD JT/FCL EVL NVASC XTR: CPT

## 2022-04-11 ENCOUNTER — PRE-ADMISSION TESTING (OUTPATIENT)
Dept: PREADMISSION TESTING | Facility: HOSPITAL | Age: 72
End: 2022-04-11

## 2022-04-11 LAB
ANION GAP SERPL CALCULATED.3IONS-SCNC: 10 MMOL/L (ref 5–15)
BUN SERPL-MCNC: 18 MG/DL (ref 8–23)
BUN/CREAT SERPL: 18.8 (ref 7–25)
CALCIUM SPEC-SCNC: 9.1 MG/DL (ref 8.6–10.5)
CHLORIDE SERPL-SCNC: 106 MMOL/L (ref 98–107)
CO2 SERPL-SCNC: 26 MMOL/L (ref 22–29)
CREAT SERPL-MCNC: 0.96 MG/DL (ref 0.76–1.27)
DEPRECATED RDW RBC AUTO: 45.1 FL (ref 37–54)
EGFRCR SERPLBLD CKD-EPI 2021: 84 ML/MIN/1.73
ERYTHROCYTE [DISTWIDTH] IN BLOOD BY AUTOMATED COUNT: 13.2 % (ref 12.3–15.4)
GLUCOSE SERPL-MCNC: 194 MG/DL (ref 65–99)
HCT VFR BLD AUTO: 44.6 % (ref 37.5–51)
HGB BLD-MCNC: 14.6 G/DL (ref 13–17.7)
MCH RBC QN AUTO: 30.2 PG (ref 26.6–33)
MCHC RBC AUTO-ENTMCNC: 32.7 G/DL (ref 31.5–35.7)
MCV RBC AUTO: 92.1 FL (ref 79–97)
PLATELET # BLD AUTO: 229 10*3/MM3 (ref 140–450)
PMV BLD AUTO: 9.8 FL (ref 6–12)
POTASSIUM SERPL-SCNC: 4 MMOL/L (ref 3.5–5.2)
QT INTERVAL: 392 MS
QTC INTERVAL: 384 MS
RBC # BLD AUTO: 4.84 10*6/MM3 (ref 4.14–5.8)
SARS-COV-2 RNA PNL SPEC NAA+PROBE: NOT DETECTED
SODIUM SERPL-SCNC: 142 MMOL/L (ref 136–145)
WBC NRBC COR # BLD: 7.78 10*3/MM3 (ref 3.4–10.8)

## 2022-04-11 PROCEDURE — C9803 HOPD COVID-19 SPEC COLLECT: HCPCS

## 2022-04-11 PROCEDURE — 36415 COLL VENOUS BLD VENIPUNCTURE: CPT

## 2022-04-11 PROCEDURE — 80048 BASIC METABOLIC PNL TOTAL CA: CPT

## 2022-04-11 PROCEDURE — U0004 COV-19 TEST NON-CDC HGH THRU: HCPCS

## 2022-04-11 PROCEDURE — 85027 COMPLETE CBC AUTOMATED: CPT

## 2022-04-11 PROCEDURE — 93010 ELECTROCARDIOGRAM REPORT: CPT | Performed by: INTERNAL MEDICINE

## 2022-04-11 PROCEDURE — 93005 ELECTROCARDIOGRAM TRACING: CPT

## 2022-05-05 DIAGNOSIS — I10 ESSENTIAL HYPERTENSION: ICD-10-CM

## 2022-05-05 DIAGNOSIS — F41.1 GAD (GENERALIZED ANXIETY DISORDER): ICD-10-CM

## 2022-05-05 RX ORDER — AMLODIPINE BESYLATE 10 MG/1
10 TABLET ORAL DAILY
Qty: 90 TABLET | Refills: 0 | Status: SHIPPED | OUTPATIENT
Start: 2022-05-05 | End: 2022-08-08

## 2022-05-05 RX ORDER — ESCITALOPRAM OXALATE 10 MG/1
10 TABLET ORAL DAILY
Qty: 90 TABLET | Refills: 0 | Status: SHIPPED | OUTPATIENT
Start: 2022-05-05 | End: 2022-08-08

## 2022-05-20 DIAGNOSIS — Z79.4 TYPE 2 DIABETES MELLITUS WITHOUT COMPLICATION, WITH LONG-TERM CURRENT USE OF INSULIN: ICD-10-CM

## 2022-05-20 DIAGNOSIS — E11.9 TYPE 2 DIABETES MELLITUS WITHOUT COMPLICATION, WITH LONG-TERM CURRENT USE OF INSULIN: ICD-10-CM

## 2022-05-24 RX ORDER — METFORMIN HYDROCHLORIDE 500 MG/1
1000 TABLET, EXTENDED RELEASE ORAL 2 TIMES DAILY WITH MEALS
Qty: 180 TABLET | Refills: 3 | Status: SHIPPED | OUTPATIENT
Start: 2022-05-24 | End: 2022-12-01

## 2022-06-15 ENCOUNTER — CLINICAL SUPPORT NO REQUIREMENTS (OUTPATIENT)
Dept: PULMONOLOGY | Facility: CLINIC | Age: 72
End: 2022-06-15

## 2022-06-15 DIAGNOSIS — Z01.812 BLOOD TESTS PRIOR TO TREATMENT OR PROCEDURE: Primary | ICD-10-CM

## 2022-06-15 PROCEDURE — 99211 OFF/OP EST MAY X REQ PHY/QHP: CPT | Performed by: INTERNAL MEDICINE

## 2022-06-15 PROCEDURE — U0004 COV-19 TEST NON-CDC HGH THRU: HCPCS | Performed by: INTERNAL MEDICINE

## 2022-06-16 LAB — SARS-COV-2 RNA NOSE QL NAA+PROBE: NOT DETECTED

## 2022-06-17 ENCOUNTER — OFFICE VISIT (OUTPATIENT)
Dept: PULMONOLOGY | Facility: CLINIC | Age: 72
End: 2022-06-17

## 2022-06-17 VITALS
HEART RATE: 72 BPM | SYSTOLIC BLOOD PRESSURE: 120 MMHG | DIASTOLIC BLOOD PRESSURE: 68 MMHG | BODY MASS INDEX: 27.25 KG/M2 | HEIGHT: 69 IN | WEIGHT: 184 LBS | OXYGEN SATURATION: 94 % | TEMPERATURE: 98 F

## 2022-06-17 DIAGNOSIS — R05.3 CHRONIC COUGH: Primary | ICD-10-CM

## 2022-06-17 DIAGNOSIS — K21.9 GERD WITHOUT ESOPHAGITIS: ICD-10-CM

## 2022-06-17 DIAGNOSIS — J45.30 MILD PERSISTENT ASTHMA WITHOUT COMPLICATION: ICD-10-CM

## 2022-06-17 DIAGNOSIS — J30.89 ENVIRONMENTAL AND SEASONAL ALLERGIES: ICD-10-CM

## 2022-06-17 PROCEDURE — 95070 INHLJ BRNCL CHALLENGE TSTG: CPT | Performed by: INTERNAL MEDICINE

## 2022-06-17 PROCEDURE — 94070 EVALUATION OF WHEEZING: CPT | Performed by: INTERNAL MEDICINE

## 2022-06-17 PROCEDURE — 99214 OFFICE O/P EST MOD 30 MIN: CPT | Performed by: INTERNAL MEDICINE

## 2022-06-17 RX ORDER — FLUTICASONE FUROATE 100 UG/1
1 POWDER RESPIRATORY (INHALATION) DAILY
Qty: 30 EACH | Refills: 11 | Status: SHIPPED | OUTPATIENT
Start: 2022-06-17 | End: 2022-09-19 | Stop reason: SDUPTHER

## 2022-06-17 RX ORDER — ALBUTEROL SULFATE 90 UG/1
2 AEROSOL, METERED RESPIRATORY (INHALATION) EVERY 4 HOURS PRN
Qty: 18 G | Refills: 11 | Status: SHIPPED | OUTPATIENT
Start: 2022-06-17 | End: 2022-09-19 | Stop reason: SDUPTHER

## 2022-06-17 RX ORDER — FLUTICASONE PROPIONATE 50 MCG
2 SPRAY, SUSPENSION (ML) NASAL DAILY
Qty: 16 G | Refills: 11 | Status: SHIPPED | OUTPATIENT
Start: 2022-06-17

## 2022-06-17 RX ORDER — ALBUTEROL SULFATE 2.5 MG/3ML
2.5 SOLUTION RESPIRATORY (INHALATION) EVERY 6 HOURS PRN
Status: SHIPPED | OUTPATIENT
Start: 2022-06-17

## 2022-06-17 RX ADMIN — ALBUTEROL SULFATE 2.5 MG: 2.5 SOLUTION RESPIRATORY (INHALATION) at 15:50

## 2022-06-17 NOTE — PROGRESS NOTES
"Follow Up Office Note       Patient Name: Kvng Li    Referring Physician: No ref. provider found    Chief Complaint:    Chief Complaint   Patient presents with   • Cough     F/U       History of Present Illness: Kvng Li is a 72 y.o. male who is here today to follow-up care with Pulmonary.   Patient has a past medical history significant diabetes mellitus type 2, hypertension, hyperlipidemia, and osteoarthritis.  Patient notes that his cough actually got quite a bit better after starting the reflux medications and then suddenly started to come back with wheezing.  He denies any chest pain, nausea, fever, or chills.  Underwent a methacholine challenge today which was positive for bronchial reactivity.  States that he still feels quite tight in his chest after the procedure.  He also notes that he is having some increased sinus congestion.  Which is new over the last few weeks.  He went back to using the Flonase that he was previously taking.    Review of Systems:   Review of Systems   Constitutional: Negative for chills, fatigue and fever.   HENT: Negative for congestion and voice change.    Eyes: Negative for blurred vision.   Respiratory: Positive for cough and wheezing. Negative for shortness of breath.    Cardiovascular: Negative for chest pain.   Skin: Negative for dry skin.   Hematological: Negative for adenopathy.   Psychiatric/Behavioral: Negative for agitation and depressed mood.       The following portions of the patient's history were reviewed and updated as appropriate: allergies, current medications, past family history, past medical history, past social history, past surgical history and problem list.    Physical Exam:  Vital Signs:   Vitals:    06/17/22 1544   BP: 120/68   BP Location: Left arm   Patient Position: Sitting   Cuff Size: Adult   Pulse: 72   Temp: 98 °F (36.7 °C)   TempSrc: Infrared   SpO2: 94%  Comment: resting, room air   Weight: 83.5 kg (184 lb)   Height: 175.3 cm (69\") "       Physical Exam  Vitals and nursing note reviewed.   Constitutional:       General: He is not in acute distress.     Appearance: He is well-developed and normal weight. He is not ill-appearing or toxic-appearing.   Cardiovascular:      Rate and Rhythm: Normal rate and regular rhythm.      Pulses: Normal pulses.      Heart sounds: Normal heart sounds. No murmur heard.    No gallop.   Pulmonary:      Effort: Pulmonary effort is normal.      Breath sounds: Wheezing and rhonchi present. No rales.   Musculoskeletal:      Right lower leg: No edema.      Left lower leg: No edema.   Neurological:      Mental Status: He is alert.         Immunization History   Administered Date(s) Administered   • COVID-19 (PFIZER) PURPLE CAP 02/17/2021, 03/10/2021, 01/04/2022   • Fluad Quad 65+ 10/01/2020   • Fluzone High Dose =>65 Years ([employer] ONLY) 02/20/2019, 10/28/2019   • Fluzone High-Dose 65+yrs 10/25/2021   • Pneumococcal Conjugate 13-Valent (PCV13) 02/08/2017, 02/08/2017   • Pneumococcal Polysaccharide (PPSV23) 08/07/2018   • Td 10/01/2020   • Tdap 08/03/2006       Results Review:   - CT scan of the chest 2/7/2022 which showed no acute process, there were some subpleural 3 mm nodules noted in the right upper lobe as well as the left upper lobe.               - chest x-ray 10/26/2021 showed no acute cardiopulmonary process.  - I personally reviewed the pts  methacholine challenge from 6/17/2022 which was positive for bronchial reactivity.   - PFTs from 2/24/2022 which showed no signs of obstruction or restriction with a normal DLCO.    Assessment / Plan:   1. Chronic cough (Primary)  -I think the patient's chronic cough is likely a combination of asthma and GERD.  We will treat each as noted below.    2. Mild persistent asthma without complication  -I will go ahead and start the patient on Arnuity 100 mcg 1 puff once daily, informed him to let us know if the insurance is not going to cover this and I would be happy to switch  him over to another ICS inhaler.  I also want him to start albuterol to be used on an as-needed basis.  We discussed quite extensively how to use the medications in addition to when to use the medications.  He did verbalize understanding of this.    3. GERD without esophagitis  -Continue on Protonix 40 mg once daily 30 minutes prior to eating on empty stomach.  Also continue dietary lifestyle modifications.    4. Environmental and seasonal allergies  -Patient does not have some allergies although likely this is causing his cough as we had recently done a postnasal drip regimen and he found no benefit at all.  Regardless of this though he still could have seasonal allergies that are affecting him at different times of the year I informed that I want him to go back on the Flonase 2 puffs per nostril nightly in addition to getting a second-generation antihistamine of his choice.      Follow Up:   Return in about 3 months (around 9/17/2022).       GAUDENCIO King, DO  Pulmonary and Critical Care Medicine  Note Electronically Signed    Part of this note may be an electronic transcription/translation of spoken language to printed text using the Dragon Dictation System.

## 2022-08-08 DIAGNOSIS — F41.1 GAD (GENERALIZED ANXIETY DISORDER): ICD-10-CM

## 2022-08-08 DIAGNOSIS — I10 ESSENTIAL HYPERTENSION: ICD-10-CM

## 2022-08-08 RX ORDER — AMLODIPINE BESYLATE 10 MG/1
TABLET ORAL
Qty: 90 TABLET | Refills: 0 | Status: SHIPPED | OUTPATIENT
Start: 2022-08-08 | End: 2022-09-08 | Stop reason: SDUPTHER

## 2022-08-08 RX ORDER — ESCITALOPRAM OXALATE 10 MG/1
TABLET ORAL
Qty: 90 TABLET | Refills: 0 | Status: SHIPPED | OUTPATIENT
Start: 2022-08-08 | End: 2022-09-08 | Stop reason: SDUPTHER

## 2022-09-08 ENCOUNTER — OFFICE VISIT (OUTPATIENT)
Dept: FAMILY MEDICINE CLINIC | Facility: CLINIC | Age: 72
End: 2022-09-08

## 2022-09-08 ENCOUNTER — LAB (OUTPATIENT)
Dept: LAB | Facility: HOSPITAL | Age: 72
End: 2022-09-08

## 2022-09-08 VITALS
HEART RATE: 64 BPM | DIASTOLIC BLOOD PRESSURE: 68 MMHG | WEIGHT: 189 LBS | OXYGEN SATURATION: 98 % | HEIGHT: 69 IN | TEMPERATURE: 97.7 F | SYSTOLIC BLOOD PRESSURE: 126 MMHG | BODY MASS INDEX: 27.99 KG/M2

## 2022-09-08 DIAGNOSIS — E11.9 TYPE 2 DIABETES MELLITUS WITHOUT COMPLICATION, WITH LONG-TERM CURRENT USE OF INSULIN: Primary | ICD-10-CM

## 2022-09-08 DIAGNOSIS — I10 PRIMARY HYPERTENSION: ICD-10-CM

## 2022-09-08 DIAGNOSIS — E78.2 MIXED HYPERLIPIDEMIA: ICD-10-CM

## 2022-09-08 DIAGNOSIS — N52.03 COMBINED ARTERIAL INSUFFICIENCY AND CORPORO-VENOUS OCCLUSIVE ERECTILE DYSFUNCTION: ICD-10-CM

## 2022-09-08 DIAGNOSIS — F41.1 GAD (GENERALIZED ANXIETY DISORDER): ICD-10-CM

## 2022-09-08 DIAGNOSIS — Z79.4 TYPE 2 DIABETES MELLITUS WITHOUT COMPLICATION, WITH LONG-TERM CURRENT USE OF INSULIN: Primary | ICD-10-CM

## 2022-09-08 DIAGNOSIS — E11.9 TYPE 2 DIABETES MELLITUS WITHOUT COMPLICATION, WITH LONG-TERM CURRENT USE OF INSULIN: ICD-10-CM

## 2022-09-08 DIAGNOSIS — Z79.4 TYPE 2 DIABETES MELLITUS WITHOUT COMPLICATION, WITH LONG-TERM CURRENT USE OF INSULIN: ICD-10-CM

## 2022-09-08 PROBLEM — J45.30 MILD PERSISTENT ASTHMA WITHOUT COMPLICATION: Status: ACTIVE | Noted: 2022-09-08

## 2022-09-08 LAB
ALBUMIN SERPL-MCNC: 4.6 G/DL (ref 3.5–5.2)
ALBUMIN/GLOB SERPL: 2.4 G/DL
ALP SERPL-CCNC: 47 U/L (ref 39–117)
ALT SERPL-CCNC: 11 U/L (ref 1–41)
AST SERPL-CCNC: 16 U/L (ref 1–40)
BASOPHILS # BLD AUTO: 0.02 10*3/MM3 (ref 0–0.2)
BASOPHILS NFR BLD AUTO: 0.2 % (ref 0–1.5)
BILIRUB SERPL-MCNC: 0.4 MG/DL (ref 0–1.2)
BUN SERPL-MCNC: 19 MG/DL (ref 8–23)
BUN/CREAT SERPL: 21.1 (ref 7–25)
CALCIUM SERPL-MCNC: 9.3 MG/DL (ref 8.6–10.5)
CHLORIDE SERPL-SCNC: 105 MMOL/L (ref 98–107)
CHOLEST SERPL-MCNC: 186 MG/DL (ref 0–200)
CO2 SERPL-SCNC: 27 MMOL/L (ref 22–29)
CREAT SERPL-MCNC: 0.9 MG/DL (ref 0.76–1.27)
EGFRCR-CYS SERPLBLD CKD-EPI 2021: 90.2 ML/MIN/1.73
EOSINOPHIL # BLD AUTO: 1.22 10*3/MM3 (ref 0–0.4)
EOSINOPHIL NFR BLD AUTO: 13.1 % (ref 0.3–6.2)
ERYTHROCYTE [DISTWIDTH] IN BLOOD BY AUTOMATED COUNT: 13 % (ref 12.3–15.4)
GLOBULIN SER CALC-MCNC: 1.9 GM/DL
GLUCOSE SERPL-MCNC: 100 MG/DL (ref 65–99)
HBA1C MFR BLD: 6.7 % (ref 4.8–5.6)
HCT VFR BLD AUTO: 48.5 % (ref 37.5–51)
HDLC SERPL-MCNC: 48 MG/DL (ref 40–60)
HGB BLD-MCNC: 15.8 G/DL (ref 13–17.7)
IMM GRANULOCYTES # BLD AUTO: 0.02 10*3/MM3 (ref 0–0.05)
IMM GRANULOCYTES NFR BLD AUTO: 0.2 % (ref 0–0.5)
LDLC SERPL CALC-MCNC: 124 MG/DL (ref 0–100)
LYMPHOCYTES # BLD AUTO: 2.44 10*3/MM3 (ref 0.7–3.1)
LYMPHOCYTES NFR BLD AUTO: 26.2 % (ref 19.6–45.3)
MCH RBC QN AUTO: 29.7 PG (ref 26.6–33)
MCHC RBC AUTO-ENTMCNC: 32.6 G/DL (ref 31.5–35.7)
MCV RBC AUTO: 91.2 FL (ref 79–97)
MONOCYTES # BLD AUTO: 0.67 10*3/MM3 (ref 0.1–0.9)
MONOCYTES NFR BLD AUTO: 7.2 % (ref 5–12)
NEUTROPHILS # BLD AUTO: 4.94 10*3/MM3 (ref 1.7–7)
NEUTROPHILS NFR BLD AUTO: 53.1 % (ref 42.7–76)
NRBC BLD AUTO-RTO: 0 /100 WBC (ref 0–0.2)
PLATELET # BLD AUTO: 217 10*3/MM3 (ref 140–450)
POTASSIUM SERPL-SCNC: 4.5 MMOL/L (ref 3.5–5.2)
PROT SERPL-MCNC: 6.5 G/DL (ref 6–8.5)
RBC # BLD AUTO: 5.32 10*6/MM3 (ref 4.14–5.8)
SODIUM SERPL-SCNC: 143 MMOL/L (ref 136–145)
TRIGL SERPL-MCNC: 74 MG/DL (ref 0–150)
VLDLC SERPL CALC-MCNC: 14 MG/DL (ref 5–40)
WBC # BLD AUTO: 9.31 10*3/MM3 (ref 3.4–10.8)

## 2022-09-08 PROCEDURE — 99214 OFFICE O/P EST MOD 30 MIN: CPT | Performed by: FAMILY MEDICINE

## 2022-09-08 RX ORDER — AMLODIPINE BESYLATE 10 MG/1
10 TABLET ORAL DAILY
Qty: 90 TABLET | Refills: 3 | Status: SHIPPED | OUTPATIENT
Start: 2022-09-08 | End: 2022-12-12 | Stop reason: SDUPTHER

## 2022-09-08 RX ORDER — ESCITALOPRAM OXALATE 10 MG/1
10 TABLET ORAL DAILY
Qty: 90 TABLET | Refills: 3 | Status: SHIPPED | OUTPATIENT
Start: 2022-09-08

## 2022-09-08 RX ORDER — TADALAFIL 10 MG/1
10 TABLET ORAL DAILY PRN
Qty: 30 TABLET | Refills: 6 | Status: SHIPPED | OUTPATIENT
Start: 2022-09-08

## 2022-09-08 NOTE — PATIENT INSTRUCTIONS
Diabetes Mellitus and Nutrition, Adult  When you have diabetes, or diabetes mellitus, it is very important to have healthy eating habits because your blood sugar (glucose) levels are greatly affected by what you eat and drink. Eating healthy foods in the right amounts, at about the same times every day, can help you:  Control your blood glucose.  Lower your risk of heart disease.  Improve your blood pressure.  Reach or maintain a healthy weight.  What can affect my meal plan?  Every person with diabetes is different, and each person has different needs for a meal plan. Your health care provider may recommend that you work with a dietitian to make a meal plan that is best for you. Your meal plan may vary depending on factors such as:  The calories you need.  The medicines you take.  Your weight.  Your blood glucose, blood pressure, and cholesterol levels.  Your activity level.  Other health conditions you have, such as heart or kidney disease.  How do carbohydrates affect me?  Carbohydrates, also called carbs, affect your blood glucose level more than any other type of food. Eating carbs naturally raises the amount of glucose in your blood. Carb counting is a method for keeping track of how many carbs you eat. Counting carbs is important to keep your blood glucose at a healthy level, especially if you use insulin or take certain oral diabetes medicines.  It is important to know how many carbs you can safely have in each meal. This is different for every person. Your dietitian can help you calculate how many carbs you should have at each meal and for each snack.  How does alcohol affect me?  Alcohol can cause a sudden decrease in blood glucose (hypoglycemia), especially if you use insulin or take certain oral diabetes medicines. Hypoglycemia can be a life-threatening condition. Symptoms of hypoglycemia, such as sleepiness, dizziness, and confusion, are similar to symptoms of having too much alcohol.  Do not drink  "alcohol if:  Your health care provider tells you not to drink.  You are pregnant, may be pregnant, or are planning to become pregnant.  If you drink alcohol:  Do not drink on an empty stomach.  Limit how much you use to:  0-1 drink a day for women.  0-2 drinks a day for men.  Be aware of how much alcohol is in your drink. In the [place] one drink equals one 12 oz bottle of beer (355 mL), one 5 oz glass of wine (148 mL), or one 1½ oz glass of hard liquor (44 mL).  Keep yourself hydrated with water, diet soda, or unsweetened iced tea.  Keep in mind that regular soda, juice, and other mixers may contain a lot of sugar and must be counted as carbs.  What are tips for following this plan?    Reading food labels  Start by checking the serving size on the \"Nutrition Facts\" label of packaged foods and drinks. The amount of calories, carbs, fats, and other nutrients listed on the label is based on one serving of the item. Many items contain more than one serving per package.  Check the total grams (g) of carbs in one serving. You can calculate the number of servings of carbs in one serving by dividing the total carbs by 15. For example, if a food has 30 g of total carbs per serving, it would be equal to 2 servings of carbs.  Check the number of grams (g) of saturated fats and trans fats in one serving. Choose foods that have a low amount or none of these fats.  Check the number of milligrams (mg) of salt (sodium) in one serving. Most people should limit total sodium intake to less than 2,300 mg per day.  Always check the nutrition information of foods labeled as \"low-fat\" or \"nonfat.\" These foods may be higher in added sugar or refined carbs and should be avoided.  Talk to your dietitian to identify your daily goals for nutrients listed on the label.  Shopping  Avoid buying canned, pre-made, or processed foods. These foods tend to be high in fat, sodium, and added sugar.  Shop around the outside edge of the grocery store. This " is where you will most often find fresh fruits and vegetables, bulk grains, fresh meats, and fresh dairy.  Cooking  Use low-heat cooking methods, such as baking, instead of high-heat cooking methods like deep frying.  Cook using healthy oils, such as olive, canola, or sunflower oil.  Avoid cooking with butter, cream, or high-fat meats.  Meal planning  Eat meals and snacks regularly, preferably at the same times every day. Avoid going long periods of time without eating.  Eat foods that are high in fiber, such as fresh fruits, vegetables, beans, and whole grains. Talk with your dietitian about how many servings of carbs you can eat at each meal.  Eat 4-6 oz (112-168 g) of lean protein each day, such as lean meat, chicken, fish, eggs, or tofu. One ounce (oz) of lean protein is equal to:  1 oz (28 g) of meat, chicken, or fish.  1 egg.  ¼ cup (62 g) of tofu.  Eat some foods each day that contain healthy fats, such as avocado, nuts, seeds, and fish.  What foods should I eat?  Fruits  Berries. Apples. Oranges. Peaches. Apricots. Plums. Grapes. [place]. Papaya. Pomegranate. Kiwi. Cherries.  Vegetables  Lettuce. Spinach. Leafy greens, including kale, chard, petty greens, and mustard greens. Beets. Cauliflower. Cabbage. Broccoli. Carrots. Green beans. Tomatoes. Peppers. Onions. Cucumbers. [place] sprouts.  Grains  Whole grains, such as whole-wheat or whole-grain bread, crackers, tortillas, cereal, and pasta. Unsweetened oatmeal. Quinoa. Brown or wild rice.  Meats and other proteins  Seafood. Poultry without skin. Lean cuts of poultry and beef. Tofu. Nuts. Seeds.  Dairy  Low-fat or fat-free dairy products such as milk, yogurt, and cheese.  The items listed above may not be a complete list of foods and beverages you can eat. Contact a dietitian for more information.  What foods should I avoid?  Fruits  Fruits canned with syrup.  Vegetables  Canned vegetables. Frozen vegetables with butter or cream sauce.  Grains  Refined  white flour and flour products such as bread, pasta, snack foods, and cereals. Avoid all processed foods.  Meats and other proteins  Fatty cuts of meat. Poultry with skin. Breaded or fried meats. Processed meat. Avoid saturated fats.  Dairy  Full-fat yogurt, cheese, or milk.  Beverages  Sweetened drinks, such as soda or iced tea.  The items listed above may not be a complete list of foods and beverages you should avoid. Contact a dietitian for more information.  Questions to ask a health care provider  Do I need to meet with a diabetes educator?  Do I need to meet with a dietitian?  What number can I call if I have questions?  When are the best times to check my blood glucose?  Where to find more information:  American Diabetes Association: diabetes.org  Academy of Nutrition and Dietetics: www.eatright.org  National [place] of Diabetes and Digestive and Kidney Diseases: www.niddk.nih.gov  Association of Diabetes Care and Education Specialists: www.diabeteseducator.org  Summary  It is important to have healthy eating habits because your blood sugar (glucose) levels are greatly affected by what you eat and drink.  A healthy meal plan will help you control your blood glucose and maintain a healthy lifestyle.  Your health care provider may recommend that you work with a dietitian to make a meal plan that is best for you.  Keep in mind that carbohydrates (carbs) and alcohol have immediate effects on your blood glucose levels. It is important to count carbs and to use alcohol carefully.  This information is not intended to replace advice given to you by your health care provider. Make sure you discuss any questions you have with your health care provider.  Document Revised: 11/24/2020 Document Reviewed: 11/24/2020  Elsevier Patient Education © 2021 [employer]  Hypertension, Adult  High blood pressure (hypertension) is when the force of blood pumping through the arteries is too strong. The arteries are the blood  "vessels that carry blood from the heart throughout the body. Hypertension forces the heart to work harder to pump blood and may cause arteries to become narrow or stiff. Untreated or uncontrolled hypertension can cause a heart attack, heart failure, a stroke, kidney disease, and other problems.  A blood pressure reading consists of a higher number over a lower number. Ideally, your blood pressure should be below 120/80. The first (\"top\") number is called the systolic pressure. It is a measure of the pressure in your arteries as your heart beats. The second (\"bottom\") number is called the diastolic pressure. It is a measure of the pressure in your arteries as the heart relaxes.  What are the causes?  The exact cause of this condition is not known. There are some conditions that result in or are related to high blood pressure.  What increases the risk?  Some risk factors for high blood pressure are under your control. The following factors may make you more likely to develop this condition:  Smoking.  Having type 2 diabetes mellitus, high cholesterol, or both.  Not getting enough exercise or physical activity.  Being overweight.  Having too much fat, sugar, calories, or salt (sodium) in your diet.  Drinking too much alcohol.  Some risk factors for high blood pressure may be difficult or impossible to change. Some of these factors include:  Having chronic kidney disease.  Having a family history of high blood pressure.  Age. Risk increases with age.  Race. You may be at higher risk if you are .  Gender. Men are at higher risk than women before age 45. After age 65, women are at higher risk than men.  Having obstructive sleep apnea.  Stress.  What are the signs or symptoms?  High blood pressure may not cause symptoms. Very high blood pressure (hypertensive crisis) may cause:  Headache.  Anxiety.  Shortness of breath.  Nosebleed.  Nausea and vomiting.  Vision changes.  Severe chest pain.  Seizures.  How " is this diagnosed?  This condition is diagnosed by measuring your blood pressure while you are seated, with your arm resting on a flat surface, your legs uncrossed, and your feet flat on the floor. The cuff of the blood pressure monitor will be placed directly against the skin of your upper arm at the level of your heart. It should be measured at least twice using the same arm. Certain conditions can cause a difference in blood pressure between your right and left arms.  Certain factors can cause blood pressure readings to be lower or higher than normal for a short period of time:  When your blood pressure is higher when you are in a health care provider's office than when you are at home, this is called white coat hypertension. Most people with this condition do not need medicines.  When your blood pressure is higher at home than when you are in a health care provider's office, this is called masked hypertension. Most people with this condition may need medicines to control blood pressure.  If you have a high blood pressure reading during one visit or you have normal blood pressure with other risk factors, you may be asked to:  Return on a different day to have your blood pressure checked again.  Monitor your blood pressure at home for 1 week or longer.  If you are diagnosed with hypertension, you may have other blood or imaging tests to help your health care provider understand your overall risk for other conditions.  How is this treated?  This condition is treated by making healthy lifestyle changes, such as eating healthy foods, exercising more, and reducing your alcohol intake. Your health care provider may prescribe medicine if lifestyle changes are not enough to get your blood pressure under control, and if:  Your systolic blood pressure is above 130.  Your diastolic blood pressure is above 80.  Your personal target blood pressure may vary depending on your medical conditions, your age, and other  factors.  Follow these instructions at home:  Eating and drinking    Eat a diet that is high in fiber and potassium, and low in sodium, added sugar, and fat. An example eating plan is called the DASH (Dietary Approaches to Stop Hypertension) diet. To eat this way:  Eat plenty of fresh fruits and vegetables. Try to fill one half of your plate at each meal with fruits and vegetables.  Eat whole grains, such as whole-wheat pasta, brown rice, or whole-grain bread. Fill about one fourth of your plate with whole grains.  Eat or drink low-fat dairy products, such as skim milk or low-fat yogurt.  Avoid fatty cuts of meat, processed or cured meats, and poultry with skin. Fill about one fourth of your plate with lean proteins, such as fish, chicken without skin, beans, eggs, or tofu.  Avoid pre-made and processed foods. These tend to be higher in sodium, added sugar, and fat.  Reduce your daily sodium intake. Most people with hypertension should eat less than 1,500 mg of sodium a day.  Do not drink alcohol if:  Your health care provider tells you not to drink.  You are pregnant, may be pregnant, or are planning to become pregnant.  If you drink alcohol:  Limit how much you use to:  0-1 drink a day for women.  0-2 drinks a day for men.  Be aware of how much alcohol is in your drink. In the [place] one drink equals one 12 oz bottle of beer (355 mL), one 5 oz glass of wine (148 mL), or one 1½ oz glass of hard liquor (44 mL).    Lifestyle    Work with your health care provider to maintain a healthy body weight or to lose weight. Ask what an ideal weight is for you.  Get at least 30 minutes of exercise most days of the week. Activities may include walking, swimming, or biking.  Include exercise to strengthen your muscles (resistance exercise), such as Pilates or lifting weights, as part of your weekly exercise routine. Try to do these types of exercises for 30 minutes at least 3 days a week.  Do not use any products that contain  nicotine or tobacco, such as cigarettes, e-cigarettes, and chewing tobacco. If you need help quitting, ask your health care provider.  Monitor your blood pressure at home as told by your health care provider.  Keep all follow-up visits as told by your health care provider. This is important.    Medicines  Take over-the-counter and prescription medicines only as told by your health care provider. Follow directions carefully. Blood pressure medicines must be taken as prescribed.  Do not skip doses of blood pressure medicine. Doing this puts you at risk for problems and can make the medicine less effective.  Ask your health care provider about side effects or reactions to medicines that you should watch for.  Contact a health care provider if you:  Think you are having a reaction to a medicine you are taking.  Have headaches that keep coming back (recurring).  Feel dizzy.  Have swelling in your ankles.  Have trouble with your vision.  Get help right away if you:  Develop a severe headache or confusion.  Have unusual weakness or numbness.  Feel faint.  Have severe pain in your chest or abdomen.  Vomit repeatedly.  Have trouble breathing.  Summary  Hypertension is when the force of blood pumping through your arteries is too strong. If this condition is not controlled, it may put you at risk for serious complications.  Your personal target blood pressure may vary depending on your medical conditions, your age, and other factors. For most people, a normal blood pressure is less than 120/80.  Hypertension is treated with lifestyle changes, medicines, or a combination of both. Lifestyle changes include losing weight, eating a healthy, low-sodium diet, exercising more, and limiting alcohol.  This information is not intended to replace advice given to you by your health care provider. Make sure you discuss any questions you have with your health care provider.  Document Revised: 08/28/2019 Document Reviewed:  08/28/2019  Elsevier Patient Education © 2021 [employer]

## 2022-09-08 NOTE — ASSESSMENT & PLAN NOTE
Lipid abnormalities are worsening since stopping statin.  Will recheck lipid panel and patient is open to starting different med if LDL is above 70.   Lipids will be reassessed in 3 months.

## 2022-09-08 NOTE — PROGRESS NOTES
Kvng Li is a 72 y.o. male who presents today for Diabetes, Hyperlipidemia, and Hypertension      Patient is here to follow-up on DM, HTN, and HLD. He stopped taking rosuvastatin due to is causing mental fog. His last labs showed an elevation in LDL cholesterol. He has had some improvement in mental fog since stopping medication. He does not check his blood pressure at home but has been taking medication as directed with no adverse effects. Patient continues to have ED and has not had relief with viagra which has also caused him to have headaches when he takes it. He is open to trying other medication.     Diabetes  He has type 2 diabetes mellitus. No MedicAlert identification noted. The initial diagnosis of diabetes was made 2005 years ago. Pertinent negatives for hypoglycemia include no confusion, dizziness, headaches, hunger, mood changes, nervousness/anxiousness, pallor, seizures, sleepiness, speech difficulty, sweats or tremors. Associated symptoms include polyuria. Pertinent negatives for diabetes include no blurred vision, no chest pain, no fatigue, no foot paresthesias, no foot ulcerations, no polydipsia, no polyphagia, no visual change, no weakness and no weight loss. Pertinent negatives for hypoglycemia complications include no blackouts, no hospitalization, no required assistance and no required glucagon injection. Symptoms are stable. Diabetic complications include impotence. Pertinent negatives for diabetic complications include no CVA, heart disease, nephropathy, peripheral neuropathy, PVD or retinopathy. Risk factors for coronary artery disease include hypertension, male sex, dyslipidemia and diabetes mellitus. Current diabetic treatment includes oral agent (dual therapy). He is compliant with treatment most of the time. His weight is stable. He is following a generally healthy diet. Meal planning includes avoidance of concentrated sweets. He has not had a previous visit with a dietitian. He  participates in exercise daily. He monitors blood glucose at home 1-2 x per day. Blood glucose monitoring compliance is good. There is no change in his home blood glucose trend. His breakfast blood glucose is taken between 9-10 am. His breakfast blood glucose range is generally 110-130 mg/dl. An ACE inhibitor/angiotensin II receptor blocker is not being taken. He does not see a podiatrist.Eye exam is current.        Review of Systems   Constitutional: Negative for fatigue, fever and unexpected weight loss.   HENT: Negative for congestion, ear pain and sore throat.    Eyes: Negative for blurred vision and visual disturbance.   Respiratory: Negative for cough, shortness of breath and wheezing.    Cardiovascular: Negative for chest pain and palpitations.   Gastrointestinal: Negative for abdominal pain, blood in stool, constipation, diarrhea, nausea, vomiting and GERD.   Endocrine: Positive for polyuria. Negative for polydipsia and polyphagia.   Genitourinary: Positive for impotence and erectile dysfunction. Negative for difficulty urinating.   Musculoskeletal: Negative for joint swelling.   Skin: Negative for pallor, rash and skin lesions.   Allergic/Immunologic: Negative for environmental allergies.   Neurological: Negative for dizziness, tremors, seizures, syncope, speech difficulty, weakness and confusion.   Hematological: Does not bruise/bleed easily.   Psychiatric/Behavioral: Negative for suicidal ideas. The patient is not nervous/anxious.         The following portions of the patient's history were reviewed and updated as appropriate: allergies, current medications, past family history, past medical history, past social history, past surgical history and problem list.    Current Outpatient Medications on File Prior to Visit   Medication Sig Dispense Refill   • albuterol sulfate  (90 Base) MCG/ACT inhaler Inhale 2 puffs Every 4 (Four) Hours As Needed for Wheezing. 18 g 11   • empagliflozin (Jardiance) 25 MG  "tablet tablet Take 1 tablet by mouth Daily. 90 tablet 3   • fluticasone (Flonase) 50 MCG/ACT nasal spray 2 sprays into the nostril(s) as directed by provider Daily. 16 g 11   • Fluticasone Furoate (Arnuity Ellipta) 100 MCG/ACT aerosol powder  Inhale 1 puff Daily. 30 each 11   • glucose blood test strip 1 each by Other route 4 (Four) Times a Day After Meals & at Bedtime. Use as instructed 100 each 12   • Lancets (freestyle) lancets 1 each by Other route 4 (Four) Times a Day After Meals & at Bedtime. 100 each 12   • metFORMIN ER (GLUCOPHAGE-XR) 500 MG 24 hr tablet Take 2 tablets by mouth 2 (Two) Times a Day With Meals. 180 tablet 3   • promethazine-codeine (PHENERGAN with CODEINE) 6.25-10 MG/5ML solution Take 5 mL by mouth Every 4 (Four) Hours As Needed for Cough. 240 mL 0   • [DISCONTINUED] amLODIPine (NORVASC) 10 MG tablet TAKE ONE TABLET BY MOUTH DAILY 90 tablet 0   • [DISCONTINUED] escitalopram (LEXAPRO) 10 MG tablet TAKE ONE TABLET BY MOUTH DAILY 90 tablet 0   • [DISCONTINUED] sildenafil (Viagra) 25 MG tablet Take 1 tablet by mouth Daily As Needed for Erectile Dysfunction. 30 tablet 6   • [DISCONTINUED] aspirin 81 MG EC tablet aspirin 81 mg capsule   Take by oral route.     • [DISCONTINUED] HYDROcodone-acetaminophen (NORCO) 5-325 MG per tablet Take 1 tablet by mouth Every 4 to 6 Hours As Needed 10 tablet 0   • [DISCONTINUED] pantoprazole (PROTONIX) 40 MG EC tablet Take 1 tablet by mouth Daily for 360 days. 90 tablet 3     Current Facility-Administered Medications on File Prior to Visit   Medication Dose Route Frequency Provider Last Rate Last Admin   • albuterol (PROVENTIL) nebulizer solution 0.083% 2.5 mg/3mL  2.5 mg Nebulization Q6H PRN Nayan King DO   2.5 mg at 06/17/22 1550       No Known Allergies     Visit Vitals  /68   Pulse 64   Temp 97.7 °F (36.5 °C)   Ht 175.3 cm (69\")   Wt 85.7 kg (189 lb)   SpO2 98%   BMI 27.91 kg/m²        Physical Exam  Constitutional:       General: He is not " in acute distress.     Appearance: He is well-developed. He is not diaphoretic.   HENT:      Head: Atraumatic.   Cardiovascular:      Rate and Rhythm: Normal rate and regular rhythm.      Heart sounds: Normal heart sounds. No murmur heard.    No friction rub. No gallop.   Pulmonary:      Effort: Pulmonary effort is normal. No respiratory distress.      Breath sounds: No stridor. Wheezing (mild expiratory wheeze) present. No rhonchi or rales.   Musculoskeletal:      Cervical back: Normal range of motion and neck supple.   Skin:     General: Skin is warm and dry.   Neurological:      Mental Status: He is alert and oriented to person, place, and time.   Psychiatric:         Behavior: Behavior normal.          Results for orders placed or performed in visit on 06/15/22   COVID-19 PCR, [facility], NP SWAB IN [facility] VIRAL TRANSPORT MEDIA/ORAL SWISH 24-30 HR TAT - Swab, Nasopharynx    Specimen: Nasopharynx; Swab   Result Value Ref Range    SARS-CoV-2 SEBAS Not Detected Not Detected        Problems Addressed this Visit        Cardiac and Vasculature    HTN (hypertension)     Hypertension is unchanged.  Continue current treatment regimen.  Blood pressure will be reassessed in 3 months.         Relevant Medications    amLODIPine (NORVASC) 10 MG tablet    Other Relevant Orders    Comprehensive Metabolic Panel    CBC & Differential    Lipid Panel    Hemoglobin A1c    HLD (hyperlipidemia)     Lipid abnormalities are worsening since stopping statin.  Will recheck lipid panel and patient is open to starting different med if LDL is above 70.   Lipids will be reassessed in 3 months.         Relevant Orders    Comprehensive Metabolic Panel    CBC & Differential    Lipid Panel       Endocrine and Metabolic    DM2 (diabetes mellitus, type 2) ([facility]) - Primary     Diabetes is unchanged.   Continue current treatment regimen.  Diabetes will be reassessed in 3 months.         Relevant Orders    Comprehensive Metabolic Panel    CBC &  Differential    Lipid Panel    Hemoglobin A1c       Genitourinary and Reproductive     ED (erectile dysfunction)     Patient got headaches and did not have good result with viagra. Will try cialis.          Relevant Medications    tadalafil (Cialis) 10 MG tablet       Mental Health    JEFE (generalized anxiety disorder)    Relevant Medications    escitalopram (LEXAPRO) 10 MG tablet      Diagnoses       Codes Comments    Type 2 diabetes mellitus without complication, with long-term current use of insulin (HCC)    -  Primary ICD-10-CM: E11.9, Z79.4  ICD-9-CM: 250.00, V58.67     Primary hypertension     ICD-10-CM: I10  ICD-9-CM: 401.9     Mixed hyperlipidemia     ICD-10-CM: E78.2  ICD-9-CM: 272.2     JEFE (generalized anxiety disorder)     ICD-10-CM: F41.1  ICD-9-CM: 300.02     Combined arterial insufficiency and corporo-venous occlusive erectile dysfunction     ICD-10-CM: N52.03  ICD-9-CM: 607.84           Return in about 3 months (around 12/8/2022) for  Follow-up HLD, ED, DM.    Rojelio Gould MD   9/8/2022

## 2022-09-19 ENCOUNTER — OFFICE VISIT (OUTPATIENT)
Dept: PULMONOLOGY | Facility: CLINIC | Age: 72
End: 2022-09-19

## 2022-09-19 VITALS
SYSTOLIC BLOOD PRESSURE: 118 MMHG | HEART RATE: 86 BPM | TEMPERATURE: 98 F | OXYGEN SATURATION: 96 % | RESPIRATION RATE: 18 BRPM | WEIGHT: 190 LBS | DIASTOLIC BLOOD PRESSURE: 64 MMHG | HEIGHT: 69 IN | BODY MASS INDEX: 28.14 KG/M2

## 2022-09-19 DIAGNOSIS — J45.30 MILD PERSISTENT ASTHMA WITHOUT COMPLICATION: Primary | ICD-10-CM

## 2022-09-19 DIAGNOSIS — K21.9 GERD WITHOUT ESOPHAGITIS: ICD-10-CM

## 2022-09-19 DIAGNOSIS — J30.89 ENVIRONMENTAL AND SEASONAL ALLERGIES: ICD-10-CM

## 2022-09-19 PROCEDURE — 99214 OFFICE O/P EST MOD 30 MIN: CPT | Performed by: INTERNAL MEDICINE

## 2022-09-19 RX ORDER — ALBUTEROL SULFATE 90 UG/1
2 AEROSOL, METERED RESPIRATORY (INHALATION) EVERY 4 HOURS PRN
Qty: 18 G | Refills: 11 | Status: SHIPPED | OUTPATIENT
Start: 2022-09-19

## 2022-09-19 RX ORDER — FLUTICASONE FUROATE 100 UG/1
1 POWDER RESPIRATORY (INHALATION) DAILY
Qty: 30 EACH | Refills: 11 | Status: SHIPPED | OUTPATIENT
Start: 2022-09-19

## 2022-09-19 NOTE — PROGRESS NOTES
"Follow Up Office Note       Patient Name: Kvng Li    Referring Physician: No ref. provider found    Chief Complaint:    Chief Complaint   Patient presents with   • Cough     3mo fu, chronic cough       History of Present Illness: Kvng Li is a 72 y.o. male who is here today to follow-up care with Pulmonary.  Patient has a past medical history significant diabetes mellitus type 2, hypertension, hyperlipidemia, and osteoarthritis.  Patient is doing much better.  I recently started him on the Arnuity his cough is diminished quite drastically.  Denies any chest pain, nausea, fever, or chills.  Still an occasional wheeze.  But nothing like he was experiencing before.  Albuterol only on a as needed basis.  No other acute complaints.    Review of Systems:   Review of Systems   Constitutional: Negative for chills, fatigue and fever.   HENT: Negative for congestion and voice change.    Eyes: Negative for blurred vision.   Respiratory: Negative for cough, shortness of breath and wheezing.    Cardiovascular: Negative for chest pain.   Skin: Negative for dry skin.   Hematological: Negative for adenopathy.   Psychiatric/Behavioral: Negative for agitation and depressed mood.       The following portions of the patient's history were reviewed and updated as appropriate: allergies, current medications, past family history, past medical history, past social history, past surgical history and problem list.    Physical Exam:  Vital Signs:   Vitals:    09/19/22 1434   BP: 118/64   Pulse: 86   Resp: 18   Temp: 98 °F (36.7 °C)   TempSrc: Infrared   SpO2: 96%   Weight: 86.2 kg (190 lb)   Height: 175.3 cm (69\")   PainSc: 0-No pain       Physical Exam  Vitals and nursing note reviewed.   Constitutional:       General: He is not in acute distress.     Appearance: He is well-developed and normal weight. He is not ill-appearing or toxic-appearing.   Cardiovascular:      Rate and Rhythm: Normal rate and regular rhythm.      " Pulses: Normal pulses.      Heart sounds: Normal heart sounds. No murmur heard.    No gallop.   Pulmonary:      Effort: Pulmonary effort is normal.      Breath sounds: Wheezing present. No rhonchi or rales.   Musculoskeletal:      Right lower leg: No edema.      Left lower leg: No edema.   Neurological:      Mental Status: He is alert.         Immunization History   Administered Date(s) Administered   • COVID-19 (PFIZER) PURPLE CAP 02/17/2021, 03/10/2021, 01/04/2022   • Fluad Quad 65+ 10/01/2020   • Fluzone High Dose =>65 Years ([employer] ONLY) 02/20/2019, 10/28/2019   • Fluzone High-Dose 65+yrs 10/25/2021   • Pneumococcal Conjugate 13-Valent (PCV13) 02/08/2017, 02/08/2017   • Pneumococcal Polysaccharide (PPSV23) 08/07/2018   • Td 10/01/2020   • Tdap 08/03/2006       Results Review:   - CT scan of the chest 2/7/2022 which showed no acute process, there were some subpleural 3 mm nodules noted in the right upper lobe as well as the left upper lobe.               - chest x-ray 10/26/2021 showed no acute cardiopulmonary process.  - Methacholine challenge from 6/17/2022 which was positive for bronchial reactivity.              - PFTs from 2/24/2022 which showed no signs of obstruction or restriction with a normal DLCO.    Assessment / Plan:   1. Mild persistent asthma without complication (Primary)  -Much better controlled at this time, continue Arnuity 100 mcg 1 puff once daily and albuterol on a as needed basis.  Get regular exercise.  -Informed him that he can try to come off of it in the meantime to see if he is having as many problems but then if he starts having any coughing congestion, or wheezing to go back on the Arnuity.  We can always go up on the inhaler if need be.  He can also utilize his albuterol up to 6 times a day if needed.    2. GERD without esophagitis  -Continue dietary lifestyle modifications.  Currently well controlled    3. Environmental and seasonal allergies  -Continue Flonase and  second-generation antihistamine, currently well controlled.      Follow Up:   Return in about 1 year (around 9/19/2023).       GAUDENCIO King,   Pulmonary and Critical Care Medicine  Note Electronically Signed    Part of this note may be an electronic transcription/translation of spoken language to printed text using the Dragon Dictation System.

## 2022-09-23 ENCOUNTER — TELEPHONE (OUTPATIENT)
Dept: FAMILY MEDICINE CLINIC | Facility: CLINIC | Age: 72
End: 2022-09-23

## 2022-09-23 DIAGNOSIS — Z79.4 TYPE 2 DIABETES MELLITUS WITHOUT COMPLICATION, WITH LONG-TERM CURRENT USE OF INSULIN: ICD-10-CM

## 2022-09-23 DIAGNOSIS — E78.2 MIXED HYPERLIPIDEMIA: Primary | ICD-10-CM

## 2022-09-23 DIAGNOSIS — E11.9 TYPE 2 DIABETES MELLITUS WITHOUT COMPLICATION, WITH LONG-TERM CURRENT USE OF INSULIN: ICD-10-CM

## 2022-09-23 RX ORDER — ATORVASTATIN CALCIUM 40 MG/1
40 TABLET, FILM COATED ORAL NIGHTLY
Qty: 90 TABLET | Refills: 1 | Status: SHIPPED | OUTPATIENT
Start: 2022-09-23 | End: 2022-12-12 | Stop reason: SDUPTHER

## 2022-09-23 NOTE — TELEPHONE ENCOUNTER
LVM for pt to return call to office. Office number given.           HUB ok to read:      Please let the patient know that labs that were drawn at previous visit were stable.  LDL cholesterol has improved from 142-124.  Rosuvastatin was causing mental fog.  We will call in prescription for atorvastatin as discussed during his most recent office visit.  Patient should twice continue current treatment plan.  If patient has any questions they can contact me.

## 2022-10-13 ENCOUNTER — TELEPHONE (OUTPATIENT)
Dept: FAMILY MEDICINE CLINIC | Facility: CLINIC | Age: 72
End: 2022-10-13

## 2022-10-13 DIAGNOSIS — Z79.4 TYPE 2 DIABETES MELLITUS WITHOUT COMPLICATION, WITH LONG-TERM CURRENT USE OF INSULIN: ICD-10-CM

## 2022-10-13 DIAGNOSIS — E11.9 TYPE 2 DIABETES MELLITUS WITHOUT COMPLICATION, WITH LONG-TERM CURRENT USE OF INSULIN: ICD-10-CM

## 2022-10-13 NOTE — TELEPHONE ENCOUNTER
Caller: Kvng Li    Relationship: Self    Best call back number: [phone]    What is the best time to reach you: ANYTIME    Who are you requesting to speak with (clinical staff, provider,  specific staff member): CLINICAL STAFF    Do you know the name of the person who called: SELF    What was the call regarding: PATIENT STATES THAT HE WOULD LIKE ABOUT HIS empagliflozin (Jardiance) 25 MG tablet tablet. PATIENT STATES THAT HE CAN NOT AFFORD THIS MEDICATION ANYMORE.     Do you require a callback:YES

## 2022-12-01 DIAGNOSIS — E11.9 TYPE 2 DIABETES MELLITUS WITHOUT COMPLICATION, WITH LONG-TERM CURRENT USE OF INSULIN: ICD-10-CM

## 2022-12-01 DIAGNOSIS — Z79.4 TYPE 2 DIABETES MELLITUS WITHOUT COMPLICATION, WITH LONG-TERM CURRENT USE OF INSULIN: ICD-10-CM

## 2022-12-01 RX ORDER — METFORMIN HYDROCHLORIDE 500 MG/1
1000 TABLET, EXTENDED RELEASE ORAL 2 TIMES DAILY WITH MEALS
Qty: 180 TABLET | Refills: 3 | Status: SHIPPED | OUTPATIENT
Start: 2022-12-01 | End: 2022-12-12 | Stop reason: SDUPTHER

## 2022-12-12 ENCOUNTER — OFFICE VISIT (OUTPATIENT)
Dept: FAMILY MEDICINE CLINIC | Facility: CLINIC | Age: 72
End: 2022-12-12

## 2022-12-12 ENCOUNTER — LAB (OUTPATIENT)
Dept: LAB | Facility: HOSPITAL | Age: 72
End: 2022-12-12

## 2022-12-12 VITALS
DIASTOLIC BLOOD PRESSURE: 60 MMHG | OXYGEN SATURATION: 98 % | WEIGHT: 192 LBS | BODY MASS INDEX: 28.44 KG/M2 | SYSTOLIC BLOOD PRESSURE: 116 MMHG | HEIGHT: 69 IN | HEART RATE: 73 BPM | TEMPERATURE: 98 F

## 2022-12-12 DIAGNOSIS — E78.2 MIXED HYPERLIPIDEMIA: ICD-10-CM

## 2022-12-12 DIAGNOSIS — Z79.4 TYPE 2 DIABETES MELLITUS WITHOUT COMPLICATION, WITH LONG-TERM CURRENT USE OF INSULIN: Primary | ICD-10-CM

## 2022-12-12 DIAGNOSIS — I10 PRIMARY HYPERTENSION: ICD-10-CM

## 2022-12-12 DIAGNOSIS — E11.9 TYPE 2 DIABETES MELLITUS WITHOUT COMPLICATION, WITH LONG-TERM CURRENT USE OF INSULIN: Primary | ICD-10-CM

## 2022-12-12 PROCEDURE — 99214 OFFICE O/P EST MOD 30 MIN: CPT | Performed by: FAMILY MEDICINE

## 2022-12-12 RX ORDER — METFORMIN HYDROCHLORIDE 500 MG/1
1000 TABLET, EXTENDED RELEASE ORAL 2 TIMES DAILY WITH MEALS
Qty: 180 TABLET | Refills: 3 | Status: SHIPPED | OUTPATIENT
Start: 2022-12-12

## 2022-12-12 RX ORDER — AMLODIPINE BESYLATE 10 MG/1
10 TABLET ORAL DAILY
Qty: 90 TABLET | Refills: 3 | Status: SHIPPED | OUTPATIENT
Start: 2022-12-12

## 2022-12-12 RX ORDER — ATORVASTATIN CALCIUM 40 MG/1
40 TABLET, FILM COATED ORAL NIGHTLY
Qty: 90 TABLET | Refills: 3 | Status: SHIPPED | OUTPATIENT
Start: 2022-12-12

## 2022-12-12 RX ORDER — LANCETS 28 GAUGE
1 EACH MISCELLANEOUS
Qty: 100 EACH | Refills: 12 | Status: SHIPPED | OUTPATIENT
Start: 2022-12-12

## 2022-12-12 NOTE — ASSESSMENT & PLAN NOTE
Diabetes is unchanged.   Continue current treatment regimen.  Will recheck A1c today and make med adjustments as indicated.   Diabetes will be reassessed in 3 months.

## 2022-12-12 NOTE — ASSESSMENT & PLAN NOTE
Lipid abnormalities are likely improving with lipitor..  Pharmacotherapy as ordered.  Lipids will be reassessed in 3 months.

## 2022-12-12 NOTE — PROGRESS NOTES
Kvng Li is a 72 y.o. male who presents today for Diabetes and Hypertension      Patient is here to follow-up on HTN, DM, HLD. He stopped the Crestor at the last visit and was switched to Lipitor due to Crestor causing mental fog. He has tolerated the Lipitor better with no adverse effects. He does nto check his blood pressure at home but is taking amlodipine daily with no adverse effects. He does check his sugar at home and has been seeing numbers mostly around 110 or lower. He does not have any episode of hypoglycemia. He takes jardiance and metformin as directed.        Review of Systems   Constitutional: Negative for fever and unexpected weight loss.   HENT: Negative for congestion, ear pain and sore throat.    Eyes: Negative for blurred vision and visual disturbance.   Respiratory: Negative for cough, shortness of breath and wheezing.    Cardiovascular: Negative for chest pain and palpitations.   Gastrointestinal: Negative for abdominal pain, blood in stool, constipation, diarrhea, nausea, vomiting and GERD.   Endocrine: Negative for polydipsia and polyuria.   Genitourinary: Positive for erectile dysfunction. Negative for difficulty urinating.   Musculoskeletal: Negative for joint swelling.   Skin: Negative for rash and skin lesions.   Allergic/Immunologic: Negative for environmental allergies.   Neurological: Negative for seizures, syncope and confusion.   Hematological: Does not bruise/bleed easily.   Psychiatric/Behavioral: Negative for suicidal ideas.        The following portions of the patient's history were reviewed and updated as appropriate: allergies, current medications, past family history, past medical history, past social history, past surgical history and problem list.    Current Outpatient Medications on File Prior to Visit   Medication Sig Dispense Refill   • albuterol sulfate  (90 Base) MCG/ACT inhaler Inhale 2 puffs Every 4 (Four) Hours As Needed for Wheezing. 18 g 11   •  "escitalopram (LEXAPRO) 10 MG tablet Take 1 tablet by mouth Daily. 90 tablet 3   • fluticasone (Flonase) 50 MCG/ACT nasal spray 2 sprays into the nostril(s) as directed by provider Daily. 16 g 11   • Fluticasone Furoate (Arnuity Ellipta) 100 MCG/ACT aerosol powder  Inhale 1 puff Daily. 30 each 11   • tadalafil (Cialis) 10 MG tablet Take 1 tablet by mouth Daily As Needed for Erectile Dysfunction. 30 tablet 6   • [DISCONTINUED] amLODIPine (NORVASC) 10 MG tablet Take 1 tablet by mouth Daily. 90 tablet 3   • [DISCONTINUED] atorvastatin (LIPITOR) 40 MG tablet Take 1 tablet by mouth Every Night. 90 tablet 1   • [DISCONTINUED] empagliflozin (Jardiance) 25 MG tablet tablet Take 1 tablet by mouth Daily. 90 tablet 3   • [DISCONTINUED] glucose blood test strip 1 each by Other route 4 (Four) Times a Day After Meals & at Bedtime. Use as instructed 100 each 12   • [DISCONTINUED] Lancets (freestyle) lancets 1 each by Other route 4 (Four) Times a Day After Meals & at Bedtime. 100 each 12   • [DISCONTINUED] metFORMIN ER (GLUCOPHAGE-XR) 500 MG 24 hr tablet TAKE 2 TABLETS BY MOUTH 2 (TWO) TIMES A DAY WITH MEALS. 180 tablet 3     Current Facility-Administered Medications on File Prior to Visit   Medication Dose Route Frequency Provider Last Rate Last Admin   • albuterol (PROVENTIL) nebulizer solution 0.083% 2.5 mg/3mL  2.5 mg Nebulization Q6H PRN Nayan King DO   2.5 mg at 06/17/22 1550       No Known Allergies     Visit Vitals  /60   Pulse 73   Temp 98 °F (36.7 °C)   Ht 175.3 cm (69\")   Wt 87.1 kg (192 lb)   SpO2 98%   BMI 28.35 kg/m²        Physical Exam  Constitutional:       General: He is not in acute distress.     Appearance: He is well-developed. He is not diaphoretic.   HENT:      Head: Atraumatic.   Cardiovascular:      Rate and Rhythm: Normal rate and regular rhythm.      Heart sounds: Normal heart sounds. No murmur heard.    No friction rub. No gallop.   Pulmonary:      Effort: Pulmonary effort is " normal. No respiratory distress.      Breath sounds: Normal breath sounds. No stridor. No wheezing, rhonchi or rales.   Musculoskeletal:      Cervical back: Normal range of motion and neck supple.   Skin:     General: Skin is warm and dry.   Neurological:      Mental Status: He is alert and oriented to person, place, and time.   Psychiatric:         Behavior: Behavior normal.          Results for orders placed or performed in visit on 09/08/22   Hemoglobin A1c    Specimen: Blood    Blood  Release to [facility]   Result Value Ref Range    Hemoglobin A1C 6.70 (H) 4.80 - 5.60 %   Lipid Panel    Specimen: Blood    Blood  Release to [facility]   Result Value Ref Range    Total Cholesterol 186 0 - 200 mg/dL    Triglycerides 74 0 - 150 mg/dL    HDL Cholesterol 48 40 - 60 mg/dL    VLDL Cholesterol Vicente 14 5 - 40 mg/dL    LDL Chol Calc (NIH) 124 (H) 0 - 100 mg/dL   Comprehensive Metabolic Panel    Specimen: Blood    Blood  Release to [facility]   Result Value Ref Range    Glucose 100 (H) 65 - 99 mg/dL    BUN 19 8 - 23 mg/dL    Creatinine 0.90 0.76 - 1.27 mg/dL    EGFR Result 90.2 >60.0 mL/min/1.73    BUN/Creatinine Ratio 21.1 7.0 - 25.0    Sodium 143 136 - 145 mmol/L    Potassium 4.5 3.5 - 5.2 mmol/L    Chloride 105 98 - 107 mmol/L    Total CO2 27.0 22.0 - 29.0 mmol/L    Calcium 9.3 8.6 - 10.5 mg/dL    Total Protein 6.5 6.0 - 8.5 g/dL    Albumin 4.60 3.50 - 5.20 g/dL    Globulin 1.9 gm/dL    A/G Ratio 2.4 g/dL    Total Bilirubin 0.4 0.0 - 1.2 mg/dL    Alkaline Phosphatase 47 39 - 117 U/L    AST (SGOT) 16 1 - 40 U/L    ALT (SGPT) 11 1 - 41 U/L   CBC & Differential    Specimen: Blood    Blood  Release to [facility]   Result Value Ref Range    WBC 9.31 3.40 - 10.80 10*3/mm3    RBC 5.32 4.14 - 5.80 10*6/mm3    Hemoglobin 15.8 13.0 - 17.7 g/dL    Hematocrit 48.5 37.5 - 51.0 %    MCV 91.2 79.0 - 97.0 fL    MCH 29.7 26.6 - 33.0 pg    MCHC 32.6 31.5 - 35.7 g/dL    RDW 13.0 12.3 - 15.4 %    Platelets 217 140 - 450 10*3/mm3    Neutrophil Rel % 53.1 42.7 -  76.0 %    Lymphocyte Rel % 26.2 19.6 - 45.3 %    Monocyte Rel % 7.2 5.0 - 12.0 %    Eosinophil Rel % 13.1 (H) 0.3 - 6.2 %    Basophil Rel % 0.2 0.0 - 1.5 %    Neutrophils Absolute 4.94 1.70 - 7.00 10*3/mm3    Lymphocytes Absolute 2.44 0.70 - 3.10 10*3/mm3    Monocytes Absolute 0.67 0.10 - 0.90 10*3/mm3    Eosinophils Absolute 1.22 (H) 0.00 - 0.40 10*3/mm3    Basophils Absolute 0.02 0.00 - 0.20 10*3/mm3    Immature Granulocyte Rel % 0.2 0.0 - 0.5 %    Immature Grans Absolute 0.02 0.00 - 0.05 10*3/mm3    nRBC 0.0 0.0 - 0.2 /100 WBC        Problems Addressed this Visit        Cardiac and Vasculature    HTN (hypertension)     Hypertension is unchanged.  Continue current treatment regimen.  Blood pressure will be reassessed at the next regular appointment.         Relevant Medications    amLODIPine (NORVASC) 10 MG tablet    Other Relevant Orders    Comprehensive Metabolic Panel    CBC & Differential    Lipid Panel    Hemoglobin A1c    HLD (hyperlipidemia)     Lipid abnormalities are likely improving with lipitor..  Pharmacotherapy as ordered.  Lipids will be reassessed in 3 months.         Relevant Medications    atorvastatin (LIPITOR) 40 MG tablet    Other Relevant Orders    Comprehensive Metabolic Panel    CBC & Differential    Lipid Panel       Endocrine and Metabolic    DM2 (diabetes mellitus, type 2) ([facility]) - Primary     Diabetes is unchanged.   Continue current treatment regimen.  Will recheck A1c today and make med adjustments as indicated.   Diabetes will be reassessed in 3 months.         Relevant Medications    metFORMIN ER (GLUCOPHAGE-XR) 500 MG 24 hr tablet    empagliflozin (Jardiance) 25 MG tablet tablet    glucose blood test strip    Lancets (freestyle) lancets    atorvastatin (LIPITOR) 40 MG tablet    Other Relevant Orders    Comprehensive Metabolic Panel    CBC & Differential    Lipid Panel    Hemoglobin A1c   Diagnoses       Codes Comments    Type 2 diabetes mellitus without complication, with long-term  current use of insulin (HCC)    -  Primary ICD-10-CM: E11.9, Z79.4  ICD-9-CM: 250.00, V58.67     Primary hypertension     ICD-10-CM: I10  ICD-9-CM: 401.9     Mixed hyperlipidemia     ICD-10-CM: E78.2  ICD-9-CM: 272.2           Return for Next scheduled follow up.    Rojelio Gould MD   12/12/2022

## 2022-12-12 NOTE — PATIENT INSTRUCTIONS
"Hypertension, Adult  High blood pressure (hypertension) is when the force of blood pumping through the arteries is too strong. The arteries are the blood vessels that carry blood from the heart throughout the body. Hypertension forces the heart to work harder to pump blood and may cause arteries to become narrow or stiff. Untreated or uncontrolled hypertension can cause a heart attack, heart failure, a stroke, kidney disease, and other problems.  A blood pressure reading consists of a higher number over a lower number. Ideally, your blood pressure should be below 120/80. The first (\"top\") number is called the systolic pressure. It is a measure of the pressure in your arteries as your heart beats. The second (\"bottom\") number is called the diastolic pressure. It is a measure of the pressure in your arteries as the heart relaxes.  What are the causes?  The exact cause of this condition is not known. There are some conditions that result in or are related to high blood pressure.  What increases the risk?  Some risk factors for high blood pressure are under your control. The following factors may make you more likely to develop this condition:  Smoking.  Having type 2 diabetes mellitus, high cholesterol, or both.  Not getting enough exercise or physical activity.  Being overweight.  Having too much fat, sugar, calories, or salt (sodium) in your diet.  Drinking too much alcohol.  Some risk factors for high blood pressure may be difficult or impossible to change. Some of these factors include:  Having chronic kidney disease.  Having a family history of high blood pressure.  Age. Risk increases with age.  Race. You may be at higher risk if you are .  Gender. Men are at higher risk than women before age 45. After age 65, women are at higher risk than men.  Having obstructive sleep apnea.  Stress.  What are the signs or symptoms?  High blood pressure may not cause symptoms. Very high blood pressure " (hypertensive crisis) may cause:  Headache.  Anxiety.  Shortness of breath.  Nosebleed.  Nausea and vomiting.  Vision changes.  Severe chest pain.  Seizures.  How is this diagnosed?  This condition is diagnosed by measuring your blood pressure while you are seated, with your arm resting on a flat surface, your legs uncrossed, and your feet flat on the floor. The cuff of the blood pressure monitor will be placed directly against the skin of your upper arm at the level of your heart. It should be measured at least twice using the same arm. Certain conditions can cause a difference in blood pressure between your right and left arms.  Certain factors can cause blood pressure readings to be lower or higher than normal for a short period of time:  When your blood pressure is higher when you are in a health care provider's office than when you are at home, this is called white coat hypertension. Most people with this condition do not need medicines.  When your blood pressure is higher at home than when you are in a health care provider's office, this is called masked hypertension. Most people with this condition may need medicines to control blood pressure.  If you have a high blood pressure reading during one visit or you have normal blood pressure with other risk factors, you may be asked to:  Return on a different day to have your blood pressure checked again.  Monitor your blood pressure at home for 1 week or longer.  If you are diagnosed with hypertension, you may have other blood or imaging tests to help your health care provider understand your overall risk for other conditions.  How is this treated?  This condition is treated by making healthy lifestyle changes, such as eating healthy foods, exercising more, and reducing your alcohol intake. Your health care provider may prescribe medicine if lifestyle changes are not enough to get your blood pressure under control, and if:  Your systolic blood pressure is above  130.  Your diastolic blood pressure is above 80.  Your personal target blood pressure may vary depending on your medical conditions, your age, and other factors.  Follow these instructions at home:  Eating and drinking    Eat a diet that is high in fiber and potassium, and low in sodium, added sugar, and fat. An example eating plan is called the DASH (Dietary Approaches to Stop Hypertension) diet. To eat this way:  Eat plenty of fresh fruits and vegetables. Try to fill one half of your plate at each meal with fruits and vegetables.  Eat whole grains, such as whole-wheat pasta, brown rice, or whole-grain bread. Fill about one fourth of your plate with whole grains.  Eat or drink low-fat dairy products, such as skim milk or low-fat yogurt.  Avoid fatty cuts of meat, processed or cured meats, and poultry with skin. Fill about one fourth of your plate with lean proteins, such as fish, chicken without skin, beans, eggs, or tofu.  Avoid pre-made and processed foods. These tend to be higher in sodium, added sugar, and fat.  Reduce your daily sodium intake. Most people with hypertension should eat less than 1,500 mg of sodium a day.  Do not drink alcohol if:  Your health care provider tells you not to drink.  You are pregnant, may be pregnant, or are planning to become pregnant.  If you drink alcohol:  Limit how much you use to:  0-1 drink a day for women.  0-2 drinks a day for men.  Be aware of how much alcohol is in your drink. In the [place] one drink equals one 12 oz bottle of beer (355 mL), one 5 oz glass of wine (148 mL), or one 1½ oz glass of hard liquor (44 mL).  Lifestyle    Work with your health care provider to maintain a healthy body weight or to lose weight. Ask what an ideal weight is for you.  Get at least 30 minutes of exercise most days of the week. Activities may include walking, swimming, or biking.  Include exercise to strengthen your muscles (resistance exercise), such as Pilates or lifting weights, as  part of your weekly exercise routine. Try to do these types of exercises for 30 minutes at least 3 days a week.  Do not use any products that contain nicotine or tobacco, such as cigarettes, e-cigarettes, and chewing tobacco. If you need help quitting, ask your health care provider.  Monitor your blood pressure at home as told by your health care provider.  Keep all follow-up visits as told by your health care provider. This is important.  Medicines  Take over-the-counter and prescription medicines only as told by your health care provider. Follow directions carefully. Blood pressure medicines must be taken as prescribed.  Do not skip doses of blood pressure medicine. Doing this puts you at risk for problems and can make the medicine less effective.  Ask your health care provider about side effects or reactions to medicines that you should watch for.  Contact a health care provider if you:  Think you are having a reaction to a medicine you are taking.  Have headaches that keep coming back (recurring).  Feel dizzy.  Have swelling in your ankles.  Have trouble with your vision.  Get help right away if you:  Develop a severe headache or confusion.  Have unusual weakness or numbness.  Feel faint.  Have severe pain in your chest or abdomen.  Vomit repeatedly.  Have trouble breathing.  Summary  Hypertension is when the force of blood pumping through your arteries is too strong. If this condition is not controlled, it may put you at risk for serious complications.  Your personal target blood pressure may vary depending on your medical conditions, your age, and other factors. For most people, a normal blood pressure is less than 120/80.  Hypertension is treated with lifestyle changes, medicines, or a combination of both. Lifestyle changes include losing weight, eating a healthy, low-sodium diet, exercising more, and limiting alcohol.  This information is not intended to replace advice given to you by your health care  provider. Make sure you discuss any questions you have with your health care provider.  Document Revised: 08/28/2019 Document Reviewed: 08/28/2019  [employer] Patient Education © 2022 [employer]  Diabetes Mellitus and Nutrition, Adult  When you have diabetes, or diabetes mellitus, it is very important to have healthy eating habits because your blood sugar (glucose) levels are greatly affected by what you eat and drink. Eating healthy foods in the right amounts, at about the same times every day, can help you:  Manage your blood glucose.  Lower your risk of heart disease.  Improve your blood pressure.  Reach or maintain a healthy weight.  What can affect my meal plan?  Every person with diabetes is different, and each person has different needs for a meal plan. Your health care provider may recommend that you work with a dietitian to make a meal plan that is best for you. Your meal plan may vary depending on factors such as:  The calories you need.  The medicines you take.  Your weight.  Your blood glucose, blood pressure, and cholesterol levels.  Your activity level.  Other health conditions you have, such as heart or kidney disease.  How do carbohydrates affect me?  Carbohydrates, also called carbs, affect your blood glucose level more than any other type of food. Eating carbs raises the amount of glucose in your blood.  It is important to know how many carbs you can safely have in each meal. This is different for every person. Your dietitian can help you calculate how many carbs you should have at each meal and for each snack.  How does alcohol affect me?  Alcohol can cause a decrease in blood glucose (hypoglycemia), especially if you use insulin or take certain diabetes medicines by mouth. Hypoglycemia can be a life-threatening condition. Symptoms of hypoglycemia, such as sleepiness, dizziness, and confusion, are similar to symptoms of having too much alcohol.  Do not drink alcohol if:  Your health care provider  "tells you not to drink.  You are pregnant, may be pregnant, or are planning to become pregnant.  If you drink alcohol:  Limit how much you have to:  0-1 drink a day for women.  0-2 drinks a day for men.  Know how much alcohol is in your drink. In the [place] one drink equals one 12 oz bottle of beer (355 mL), one 5 oz glass of wine (148 mL), or one 1½ oz glass of hard liquor (44 mL).  Keep yourself hydrated with water, diet soda, or unsweetened iced tea. Keep in mind that regular soda, juice, and other mixers may contain a lot of sugar and must be counted as carbs.  What are tips for following this plan?  Reading food labels  Start by checking the serving size on the Nutrition Facts label of packaged foods and drinks. The number of calories and the amount of carbs, fats, and other nutrients listed on the label are based on one serving of the item. Many items contain more than one serving per package.  Check the total grams (g) of carbs in one serving.  Check the number of grams of saturated fats and trans fats in one serving. Choose foods that have a low amount or none of these fats.  Check the number of milligrams (mg) of salt (sodium) in one serving. Most people should limit total sodium intake to less than 2,300 mg per day.  Always check the nutrition information of foods labeled as \"low-fat\" or \"nonfat.\" These foods may be higher in added sugar or refined carbs and should be avoided.  Talk to your dietitian to identify your daily goals for nutrients listed on the label.  Shopping  Avoid buying canned, pre-made, or processed foods. These foods tend to be high in fat, sodium, and added sugar.  Shop around the outside edge of the grocery store. This is where you will most often find fresh fruits and vegetables, bulk grains, fresh meats, and fresh dairy products.  Cooking  Use low-heat cooking methods, such as baking, instead of high-heat cooking methods, such as deep frying.  Cook using healthy oils, such as olive, " canola, or sunflower oil.  Avoid cooking with butter, cream, or high-fat meats.  Meal planning  Eat meals and snacks regularly, preferably at the same times every day. Avoid going long periods of time without eating.  Eat foods that are high in fiber, such as fresh fruits, vegetables, beans, and whole grains.  Eat 4-6 oz (112-168 g) of lean protein each day, such as lean meat, chicken, fish, eggs, or tofu. One ounce (oz) (28 g) of lean protein is equal to:  1 oz (28 g) of meat, chicken, or fish.  1 egg.  ¼ cup (62 g) of tofu.  Eat some foods each day that contain healthy fats, such as avocado, nuts, seeds, and fish.  What foods should I eat?  Fruits  Berries. Apples. Oranges. Peaches. Apricots. Plums. Grapes. Mangoes. Papayas. Pomegranates. Kiwi. Cherries.  Vegetables  Leafy greens, including lettuce, spinach, kale, chard, petty greens, mustard greens, and cabbage. Beets. Cauliflower. Broccoli. Carrots. Green beans. Tomatoes. Peppers. Onions. Cucumbers. [place] sprouts.  Grains  Whole grains, such as whole-wheat or whole-grain bread, crackers, tortillas, cereal, and pasta. Unsweetened oatmeal. Quinoa. Brown or wild rice.  Meats and other proteins  Seafood. Poultry without skin. Lean cuts of poultry and beef. Tofu. Nuts. Seeds.  Dairy  Low-fat or fat-free dairy products such as milk, yogurt, and cheese.  The items listed above may not be a complete list of foods and beverages you can eat and drink. Contact a dietitian for more information.  What foods should I avoid?  Fruits  Fruits canned with syrup.  Vegetables  Canned vegetables. Frozen vegetables with butter or cream sauce.  Grains  Refined white flour and flour products such as bread, pasta, snack foods, and cereals. Avoid all processed foods.  Meats and other proteins  Fatty cuts of meat. Poultry with skin. Breaded or fried meats. Processed meat. Avoid saturated fats.  Dairy  Full-fat yogurt, cheese, or milk.  Beverages  Sweetened drinks, such as soda or  iced tea.  The items listed above may not be a complete list of foods and beverages you should avoid. Contact a dietitian for more information.  Questions to ask a health care provider  Do I need to meet with a certified diabetes care and ?  Do I need to meet with a dietitian?  What number can I call if I have questions?  When are the best times to check my blood glucose?  Where to find more information:  American Diabetes Association: diabetes.org  Academy of Nutrition and Dietetics: eatright.org  National [place] of Diabetes and Digestive and Kidney Diseases: niddk.nih.gov  Association of Diabetes Care & Education Specialists: diabeteseducator.org  Summary  It is important to have healthy eating habits because your blood sugar (glucose) levels are greatly affected by what you eat and drink. It is important to use alcohol carefully.  A healthy meal plan will help you manage your blood glucose and lower your risk of heart disease.  Your health care provider may recommend that you work with a dietitian to make a meal plan that is best for you.  This information is not intended to replace advice given to you by your health care provider. Make sure you discuss any questions you have with your health care provider.  Document Revised: 07/21/2021 Document Reviewed: 07/21/2021  [employer] Patient Education © 2022 [employer]  BMI for Adults  What is BMI?  Body mass index (BMI) is a number that is calculated from a person's weight and height. BMI can help estimate how much of a person's weight is composed of fat. BMI does not measure body fat directly. Rather, it is an alternative to procedures that directly measure body fat, which can be difficult and expensive.  BMI can help identify people who may be at higher risk for certain medical problems.  What are BMI measurements used for?  BMI is used as a screening tool to identify possible weight problems. It helps determine whether a person is obese,  "overweight, a healthy weight, or underweight.  BMI is useful for:  Identifying a weight problem that may be related to a medical condition or may increase the risk for medical problems.  Promoting changes, such as changes in diet and exercise, to help reach a healthy weight. BMI screening can be repeated to see if these changes are working.  How is BMI calculated?  BMI involves measuring your weight in relation to your height. Both height and weight are measured, and the BMI is calculated from those numbers. This can be done either in English ([place] or metric measurements. Note that charts and online BMI calculators are available to help you find your BMI quickly and easily without having to do these calculations yourself.  To calculate your BMI in English ([place] measurements:    Measure your weight in pounds (lb).  Multiply the number of pounds by 703.  For example, for a person who weighs 180 lb, multiply that number by 703, which equals 126,540.  Measure your height in inches. Then multiply that number by itself to get a measurement called \"inches squared.\"  For example, for a person who is 70 inches tall, the \"inches squared\" measurement is 70 inches x 70 inches, which equals 4,900 inches squared.  Divide the total from step 2 (number of lb x 703) by the total from step 3 (inches squared): 126,540 ÷ 4,900 = 25.8. This is your BMI.  To calculate your BMI in metric measurements:  Measure your weight in kilograms (kg).  Measure your height in meters (m). Then multiply that number by itself to get a measurement called \"meters squared.\"  For example, for a person who is 1.75 m tall, the \"meters squared\" measurement is 1.75 m x 1.75 m, which is equal to 3.1 meters squared.  Divide the number of kilograms (your weight) by the meters squared number. In this example: 70 ÷ 3.1 = 22.6. This is your BMI.  What do the results mean?  BMI charts are used to identify whether you are underweight, normal weight, overweight, or " obese. The following guidelines will be used:  Underweight: BMI less than 18.5.  Normal weight: BMI between 18.5 and 24.9.  Overweight: BMI between 25 and 29.9.  Obese: BMI of 30 or above.  Keep these notes in mind:  Weight includes both fat and muscle, so someone with a muscular build, such as an athlete, may have a BMI that is higher than 24.9. In cases like these, BMI is not an accurate measure of body fat.  To determine if excess body fat is the cause of a BMI of 25 or higher, further assessments may need to be done by a health care provider.  BMI is usually interpreted in the same way for men and women.  Where to find more information  For more information about BMI, including tools to quickly calculate your BMI, go to these websites:  Centers for Disease Control and Prevention: www.cdc.gov  American Heart Association: www.heart.org  National Heart, Lung, and Blood [place]: www.nhlbi.nih.gov  Summary  Body mass index (BMI) is a number that is calculated from a person's weight and height.  BMI may help estimate how much of a person's weight is composed of fat. BMI can help identify those who may be at higher risk for certain medical problems.  BMI can be measured using English measurements or metric measurements.  BMI charts are used to identify whether you are underweight, normal weight, overweight, or obese.  This information is not intended to replace advice given to you by your health care provider. Make sure you discuss any questions you have with your health care provider.  Document Revised: 09/09/2020 Document Reviewed: 07/17/2020  [employer] Patient Education © 2022 [employer]

## 2022-12-13 LAB
ALBUMIN SERPL-MCNC: 4.5 G/DL (ref 3.5–5.2)
ALBUMIN/GLOB SERPL: 2.4 G/DL
ALP SERPL-CCNC: 49 U/L (ref 39–117)
ALT SERPL-CCNC: 15 U/L (ref 1–41)
AST SERPL-CCNC: 18 U/L (ref 1–40)
BASOPHILS # BLD AUTO: 0.04 10*3/MM3 (ref 0–0.2)
BASOPHILS NFR BLD AUTO: 0.4 % (ref 0–1.5)
BILIRUB SERPL-MCNC: 0.6 MG/DL (ref 0–1.2)
BUN SERPL-MCNC: 19 MG/DL (ref 8–23)
BUN/CREAT SERPL: 20.9 (ref 7–25)
CALCIUM SERPL-MCNC: 9.4 MG/DL (ref 8.6–10.5)
CHLORIDE SERPL-SCNC: 104 MMOL/L (ref 98–107)
CHOLEST SERPL-MCNC: 140 MG/DL (ref 0–200)
CO2 SERPL-SCNC: 29 MMOL/L (ref 22–29)
CREAT SERPL-MCNC: 0.91 MG/DL (ref 0.76–1.27)
EGFRCR SERPLBLD CKD-EPI 2021: 89 ML/MIN/1.73
EOSINOPHIL # BLD AUTO: 1.48 10*3/MM3 (ref 0–0.4)
EOSINOPHIL NFR BLD AUTO: 16.6 % (ref 0.3–6.2)
ERYTHROCYTE [DISTWIDTH] IN BLOOD BY AUTOMATED COUNT: 13.3 % (ref 12.3–15.4)
GLOBULIN SER CALC-MCNC: 1.9 GM/DL
GLUCOSE SERPL-MCNC: 95 MG/DL (ref 65–99)
HBA1C MFR BLD: 7.2 % (ref 4.8–5.6)
HCT VFR BLD AUTO: 45.2 % (ref 37.5–51)
HDLC SERPL-MCNC: 51 MG/DL (ref 40–60)
HGB BLD-MCNC: 15.6 G/DL (ref 13–17.7)
IMM GRANULOCYTES # BLD AUTO: 0.02 10*3/MM3 (ref 0–0.05)
IMM GRANULOCYTES NFR BLD AUTO: 0.2 % (ref 0–0.5)
LDLC SERPL CALC-MCNC: 73 MG/DL (ref 0–100)
LYMPHOCYTES # BLD AUTO: 2.69 10*3/MM3 (ref 0.7–3.1)
LYMPHOCYTES NFR BLD AUTO: 30.2 % (ref 19.6–45.3)
MCH RBC QN AUTO: 30.1 PG (ref 26.6–33)
MCHC RBC AUTO-ENTMCNC: 34.5 G/DL (ref 31.5–35.7)
MCV RBC AUTO: 87.1 FL (ref 79–97)
MONOCYTES # BLD AUTO: 0.66 10*3/MM3 (ref 0.1–0.9)
MONOCYTES NFR BLD AUTO: 7.4 % (ref 5–12)
NEUTROPHILS # BLD AUTO: 4.02 10*3/MM3 (ref 1.7–7)
NEUTROPHILS NFR BLD AUTO: 45.2 % (ref 42.7–76)
NRBC BLD AUTO-RTO: 0.1 /100 WBC (ref 0–0.2)
PLATELET # BLD AUTO: 208 10*3/MM3 (ref 140–450)
POTASSIUM SERPL-SCNC: 4.4 MMOL/L (ref 3.5–5.2)
PROT SERPL-MCNC: 6.4 G/DL (ref 6–8.5)
RBC # BLD AUTO: 5.19 10*6/MM3 (ref 4.14–5.8)
SODIUM SERPL-SCNC: 143 MMOL/L (ref 136–145)
TRIGL SERPL-MCNC: 82 MG/DL (ref 0–150)
VLDLC SERPL CALC-MCNC: 16 MG/DL (ref 5–40)
WBC # BLD AUTO: 8.91 10*3/MM3 (ref 3.4–10.8)

## 2023-03-29 ENCOUNTER — LAB (OUTPATIENT)
Dept: LAB | Facility: HOSPITAL | Age: 73
End: 2023-03-29
Payer: MEDICARE

## 2023-03-29 ENCOUNTER — OFFICE VISIT (OUTPATIENT)
Dept: FAMILY MEDICINE CLINIC | Facility: CLINIC | Age: 73
End: 2023-03-29
Payer: MEDICARE

## 2023-03-29 VITALS
WEIGHT: 182 LBS | SYSTOLIC BLOOD PRESSURE: 120 MMHG | HEART RATE: 65 BPM | HEIGHT: 71 IN | TEMPERATURE: 98 F | BODY MASS INDEX: 25.48 KG/M2 | DIASTOLIC BLOOD PRESSURE: 80 MMHG | OXYGEN SATURATION: 96 %

## 2023-03-29 DIAGNOSIS — E11.9 TYPE 2 DIABETES MELLITUS WITHOUT COMPLICATION, WITH LONG-TERM CURRENT USE OF INSULIN: ICD-10-CM

## 2023-03-29 DIAGNOSIS — Z00.00 MEDICARE ANNUAL WELLNESS VISIT, SUBSEQUENT: ICD-10-CM

## 2023-03-29 DIAGNOSIS — Z00.00 MEDICARE ANNUAL WELLNESS VISIT, SUBSEQUENT: Primary | ICD-10-CM

## 2023-03-29 DIAGNOSIS — Z79.4 TYPE 2 DIABETES MELLITUS WITHOUT COMPLICATION, WITH LONG-TERM CURRENT USE OF INSULIN: ICD-10-CM

## 2023-03-29 DIAGNOSIS — Z12.5 ENCOUNTER FOR SCREENING FOR MALIGNANT NEOPLASM OF PROSTATE: ICD-10-CM

## 2023-03-29 DIAGNOSIS — I10 PRIMARY HYPERTENSION: ICD-10-CM

## 2023-03-29 DIAGNOSIS — E78.2 MIXED HYPERLIPIDEMIA: ICD-10-CM

## 2023-03-29 LAB
ALBUMIN/CREATININE RATIO, URINE: NORMAL
EXPIRATION DATE: NORMAL
Lab: NORMAL
POC CREATININE URINE: 100
POC MICROALBUMIN URINE: 80

## 2023-03-29 NOTE — PATIENT INSTRUCTIONS
Advance Care Planning and Advance Directives     You make decisions on a daily basis - decisions about where you want to live, your career, your home, your life. Perhaps one of the most important decisions you face is your choice for future medical care. Take time to talk with your family and your healthcare team and start planning today.  Advance Care Planning is a process that can help you:  Understand possible future healthcare decisions in light of your own experiences  Reflect on those decision in light of your goals and values  Discuss your decisions with those closest to you and the healthcare professionals that care for you  Make a plan by creating a document that reflects your wishes    Surrogate Decision Maker  In the event of a medical emergency, which has left you unable to communicate or to make your own decisions, you would need someone to make decisions for you.  It is important to discuss your preferences for medical treatment with this person while you are in good health.     Qualities of a surrogate decision maker:  Willing to take on this role and responsibility  Knows what you want for future medical care  Willing to follow your wishes even if they don't agree with them  Able to make difficult medical decisions under stressful circumstances    Advance Directives  These are legal documents you can create that will guide your healthcare team and decision maker(s) when needed. These documents can be stored in the electronic medical record.    Living Will - a legal document to guide your care if you have a terminal condition or a serious illness and are unable to communicate. States vary by statute in document names/types, but most forms may include one or more of the following:        -  Directions regarding life-prolonging treatments        -  Directions regarding artificially provided nutrition/hydration        -  Choosing a healthcare decision maker        -  Direction regarding organ/tissue  donation    Durable Power of  for Healthcare - this document names an -in-fact to make medical decisions for you, but it may also allow this person to make personal and financial decisions for you. Please seek the advice of an  if you need this type of document.    **Advance Directives are not required and no one may discriminate against you if you do not sign one.    Medical Orders  Many states allow specific forms/orders signed by your physician to record your wishes for medical treatment in your current state of health. This form, signed in personal communication with your physician, addresses resuscitation and other medical interventions that you may or may not want.      For more information or to schedule a time with a [facility] Advance Care Planning Facilitator contact: [email or web address] or call [phone] and someone will contact you directly.Advance Directive  Advance directives are legal documents that allow you to make decisions about your health care and medical treatment in case you become unable to communicate for yourself. Advance directives let your wishes be known to family, friends, and health care providers.  Discussing and writing advance directives should happen over time rather than all at once. Advance directives can be changed and updated at any time. There are different types of advance directives, such as:  Medical power of .  Living will.  Do not resuscitate (DNR) order or do not attempt resuscitation (DNAR) order.  Health care proxy and medical power of   A health care proxy is also called a health care agent. This person is appointed to make medical decisions for you when you are unable to make decisions for yourself. Generally, people ask a trusted friend or family member to act as their proxy and represent their preferences. Make sure you have an agreement with your trusted person to act as your proxy. A proxy may have to make a  medical decision on your behalf if your wishes are not known.  A medical power of , also called a durable power of  for health care, is a legal document that names your health care proxy. Depending on the laws in your state, the document may need to be:  Signed.  Notarized.  Dated.  Copied.  Witnessed.  Incorporated into your medical record.  You may also want to appoint a trusted person to manage your money in the event you are unable to do so. This is called a durable power of  for finances. It is a separate legal document from the durable power of  for health care. You may choose your health care proxy or someone different to act as your agent in money matters.  If you do not appoint a proxy, or there is a concern that the proxy is not acting in your best interest, a court may appoint a guardian to act on your behalf.  Living will  A living will is a set of instructions that state your wishes about medical care when you cannot express them yourself. Health care providers should keep a copy of your living will in your medical record. You may want to give a copy to family members or friends. To alert caregivers in case of an emergency, you can place a card in your wallet to let them know that you have a living will and where they can find it. A living will is used if you become:  Terminally ill.  Disabled.  Unable to communicate or make decisions.  The following decisions should be included in your living will:  To use or not to use life support equipment, such as dialysis machines and breathing machines (ventilators).  Whether you want a DNR or DNAR order. This tells health care providers not to use cardiopulmonary resuscitation (CPR) if breathing or heartbeat stops.  To use or not to use tube feeding.  To be given or not to be given food and fluids.  Whether you want comfort (palliative) care when the goal becomes comfort rather than a cure.  Whether you want to donate your organs  and tissues.  A living will does not give instructions for distributing your money and property if you should pass away.  DNR or DNAR  A DNR or DNAR order is a request not to have CPR in the event that your heart stops beating or you stop breathing. If a DNR or DNAR order has not been made and shared, a health care provider will try to help any patient whose heart has stopped or who has stopped breathing. If you plan to have surgery, talk with your health care provider about how your DNR or DNAR order will be followed if problems occur.  What if I do not have an advance directive?  Some states assign family decision makers to act on your behalf if you do not have an advance directive. Each state has its own laws about advance directives. You may want to check with your health care provider, , or state representative about the laws in your state.  Summary  Advance directives are legal documents that allow you to make decisions about your health care and medical treatment in case you become unable to communicate for yourself.  The process of discussing and writing advance directives should happen over time. You can change and update advance directives at any time.  Advance directives may include a medical power of , a living will, and a DNR or DNAR order.  This information is not intended to replace advice given to you by your health care provider. Make sure you discuss any questions you have with your health care provider.  Document Revised: 09/21/2021 Document Reviewed: 09/21/2021  [employer] Patient Education © 2022 [employer]  Hypertension, Adult  High blood pressure (hypertension) is when the force of blood pumping through the arteries is too strong. The arteries are the blood vessels that carry blood from the heart throughout the body. Hypertension forces the heart to work harder to pump blood and may cause arteries to become narrow or stiff. Untreated or uncontrolled hypertension can cause a  "heart attack, heart failure, a stroke, kidney disease, and other problems.  A blood pressure reading consists of a higher number over a lower number. Ideally, your blood pressure should be below 120/80. The first (\"top\") number is called the systolic pressure. It is a measure of the pressure in your arteries as your heart beats. The second (\"bottom\") number is called the diastolic pressure. It is a measure of the pressure in your arteries as the heart relaxes.  What are the causes?  The exact cause of this condition is not known. There are some conditions that result in or are related to high blood pressure.  What increases the risk?  Some risk factors for high blood pressure are under your control. The following factors may make you more likely to develop this condition:  Smoking.  Having type 2 diabetes mellitus, high cholesterol, or both.  Not getting enough exercise or physical activity.  Being overweight.  Having too much fat, sugar, calories, or salt (sodium) in your diet.  Drinking too much alcohol.  Some risk factors for high blood pressure may be difficult or impossible to change. Some of these factors include:  Having chronic kidney disease.  Having a family history of high blood pressure.  Age. Risk increases with age.  Race. You may be at higher risk if you are .  Gender. Men are at higher risk than women before age 45. After age 65, women are at higher risk than men.  Having obstructive sleep apnea.  Stress.  What are the signs or symptoms?  High blood pressure may not cause symptoms. Very high blood pressure (hypertensive crisis) may cause:  Headache.  Anxiety.  Shortness of breath.  Nosebleed.  Nausea and vomiting.  Vision changes.  Severe chest pain.  Seizures.  How is this diagnosed?  This condition is diagnosed by measuring your blood pressure while you are seated, with your arm resting on a flat surface, your legs uncrossed, and your feet flat on the floor. The cuff of the blood " pressure monitor will be placed directly against the skin of your upper arm at the level of your heart. It should be measured at least twice using the same arm. Certain conditions can cause a difference in blood pressure between your right and left arms.  Certain factors can cause blood pressure readings to be lower or higher than normal for a short period of time:  When your blood pressure is higher when you are in a health care provider's office than when you are at home, this is called white coat hypertension. Most people with this condition do not need medicines.  When your blood pressure is higher at home than when you are in a health care provider's office, this is called masked hypertension. Most people with this condition may need medicines to control blood pressure.  If you have a high blood pressure reading during one visit or you have normal blood pressure with other risk factors, you may be asked to:  Return on a different day to have your blood pressure checked again.  Monitor your blood pressure at home for 1 week or longer.  If you are diagnosed with hypertension, you may have other blood or imaging tests to help your health care provider understand your overall risk for other conditions.  How is this treated?  This condition is treated by making healthy lifestyle changes, such as eating healthy foods, exercising more, and reducing your alcohol intake. Your health care provider may prescribe medicine if lifestyle changes are not enough to get your blood pressure under control, and if:  Your systolic blood pressure is above 130.  Your diastolic blood pressure is above 80.  Your personal target blood pressure may vary depending on your medical conditions, your age, and other factors.  Follow these instructions at home:  Eating and drinking    Eat a diet that is high in fiber and potassium, and low in sodium, added sugar, and fat. An example eating plan is called the DASH (Dietary Approaches to Stop  Hypertension) diet. To eat this way:  Eat plenty of fresh fruits and vegetables. Try to fill one half of your plate at each meal with fruits and vegetables.  Eat whole grains, such as whole-wheat pasta, brown rice, or whole-grain bread. Fill about one fourth of your plate with whole grains.  Eat or drink low-fat dairy products, such as skim milk or low-fat yogurt.  Avoid fatty cuts of meat, processed or cured meats, and poultry with skin. Fill about one fourth of your plate with lean proteins, such as fish, chicken without skin, beans, eggs, or tofu.  Avoid pre-made and processed foods. These tend to be higher in sodium, added sugar, and fat.  Reduce your daily sodium intake. Most people with hypertension should eat less than 1,500 mg of sodium a day.  Do not drink alcohol if:  Your health care provider tells you not to drink.  You are pregnant, may be pregnant, or are planning to become pregnant.  If you drink alcohol:  Limit how much you use to:  0-1 drink a day for women.  0-2 drinks a day for men.  Be aware of how much alcohol is in your drink. In the [place] one drink equals one 12 oz bottle of beer (355 mL), one 5 oz glass of wine (148 mL), or one 1½ oz glass of hard liquor (44 mL).  Lifestyle    Work with your health care provider to maintain a healthy body weight or to lose weight. Ask what an ideal weight is for you.  Get at least 30 minutes of exercise most days of the week. Activities may include walking, swimming, or biking.  Include exercise to strengthen your muscles (resistance exercise), such as Pilates or lifting weights, as part of your weekly exercise routine. Try to do these types of exercises for 30 minutes at least 3 days a week.  Do not use any products that contain nicotine or tobacco, such as cigarettes, e-cigarettes, and chewing tobacco. If you need help quitting, ask your health care provider.  Monitor your blood pressure at home as told by your health care provider.  Keep all follow-up  visits as told by your health care provider. This is important.  Medicines  Take over-the-counter and prescription medicines only as told by your health care provider. Follow directions carefully. Blood pressure medicines must be taken as prescribed.  Do not skip doses of blood pressure medicine. Doing this puts you at risk for problems and can make the medicine less effective.  Ask your health care provider about side effects or reactions to medicines that you should watch for.  Contact a health care provider if you:  Think you are having a reaction to a medicine you are taking.  Have headaches that keep coming back (recurring).  Feel dizzy.  Have swelling in your ankles.  Have trouble with your vision.  Get help right away if you:  Develop a severe headache or confusion.  Have unusual weakness or numbness.  Feel faint.  Have severe pain in your chest or abdomen.  Vomit repeatedly.  Have trouble breathing.  Summary  Hypertension is when the force of blood pumping through your arteries is too strong. If this condition is not controlled, it may put you at risk for serious complications.  Your personal target blood pressure may vary depending on your medical conditions, your age, and other factors. For most people, a normal blood pressure is less than 120/80.  Hypertension is treated with lifestyle changes, medicines, or a combination of both. Lifestyle changes include losing weight, eating a healthy, low-sodium diet, exercising more, and limiting alcohol.  This information is not intended to replace advice given to you by your health care provider. Make sure you discuss any questions you have with your health care provider.  Document Revised: 08/28/2019 Document Reviewed: 08/28/2019  [employer] Patient Education © 2022 [employer].  Diabetes Mellitus and Nutrition, Adult  When you have diabetes, or diabetes mellitus, it is very important to have healthy eating habits because your blood sugar (glucose) levels are  greatly affected by what you eat and drink. Eating healthy foods in the right amounts, at about the same times every day, can help you:  Manage your blood glucose.  Lower your risk of heart disease.  Improve your blood pressure.  Reach or maintain a healthy weight.  What can affect my meal plan?  Every person with diabetes is different, and each person has different needs for a meal plan. Your health care provider may recommend that you work with a dietitian to make a meal plan that is best for you. Your meal plan may vary depending on factors such as:  The calories you need.  The medicines you take.  Your weight.  Your blood glucose, blood pressure, and cholesterol levels.  Your activity level.  Other health conditions you have, such as heart or kidney disease.  How do carbohydrates affect me?  Carbohydrates, also called carbs, affect your blood glucose level more than any other type of food. Eating carbs raises the amount of glucose in your blood.  It is important to know how many carbs you can safely have in each meal. This is different for every person. Your dietitian can help you calculate how many carbs you should have at each meal and for each snack.  How does alcohol affect me?  Alcohol can cause a decrease in blood glucose (hypoglycemia), especially if you use insulin or take certain diabetes medicines by mouth. Hypoglycemia can be a life-threatening condition. Symptoms of hypoglycemia, such as sleepiness, dizziness, and confusion, are similar to symptoms of having too much alcohol.  Do not drink alcohol if:  Your health care provider tells you not to drink.  You are pregnant, may be pregnant, or are planning to become pregnant.  If you drink alcohol:  Limit how much you have to:  0-1 drink a day for women.  0-2 drinks a day for men.  Know how much alcohol is in your drink. In the [place] one drink equals one 12 oz bottle of beer (355 mL), one 5 oz glass of wine (148 mL), or one 1½ oz glass of hard liquor (44  "mL).  Keep yourself hydrated with water, diet soda, or unsweetened iced tea. Keep in mind that regular soda, juice, and other mixers may contain a lot of sugar and must be counted as carbs.  What are tips for following this plan?  Reading food labels  Start by checking the serving size on the Nutrition Facts label of packaged foods and drinks. The number of calories and the amount of carbs, fats, and other nutrients listed on the label are based on one serving of the item. Many items contain more than one serving per package.  Check the total grams (g) of carbs in one serving.  Check the number of grams of saturated fats and trans fats in one serving. Choose foods that have a low amount or none of these fats.  Check the number of milligrams (mg) of salt (sodium) in one serving. Most people should limit total sodium intake to less than 2,300 mg per day.  Always check the nutrition information of foods labeled as \"low-fat\" or \"nonfat.\" These foods may be higher in added sugar or refined carbs and should be avoided.  Talk to your dietitian to identify your daily goals for nutrients listed on the label.  Shopping  Avoid buying canned, pre-made, or processed foods. These foods tend to be high in fat, sodium, and added sugar.  Shop around the outside edge of the grocery store. This is where you will most often find fresh fruits and vegetables, bulk grains, fresh meats, and fresh dairy products.  Cooking  Use low-heat cooking methods, such as baking, instead of high-heat cooking methods, such as deep frying.  Cook using healthy oils, such as olive, canola, or sunflower oil.  Avoid cooking with butter, cream, or high-fat meats.  Meal planning  Eat meals and snacks regularly, preferably at the same times every day. Avoid going long periods of time without eating.  Eat foods that are high in fiber, such as fresh fruits, vegetables, beans, and whole grains.  Eat 4-6 oz (112-168 g) of lean protein each day, such as lean meat, " chicken, fish, eggs, or tofu. One ounce (oz) (28 g) of lean protein is equal to:  1 oz (28 g) of meat, chicken, or fish.  1 egg.  ¼ cup (62 g) of tofu.  Eat some foods each day that contain healthy fats, such as avocado, nuts, seeds, and fish.  What foods should I eat?  Fruits  Berries. Apples. Oranges. Peaches. Apricots. Plums. Grapes. Mangoes. Papayas. Pomegranates. Kiwi. Cherries.  Vegetables  Leafy greens, including lettuce, spinach, kale, chard, petty greens, mustard greens, and cabbage. Beets. Cauliflower. Broccoli. Carrots. Green beans. Tomatoes. Peppers. Onions. Cucumbers. [place] sprouts.  Grains  Whole grains, such as whole-wheat or whole-grain bread, crackers, tortillas, cereal, and pasta. Unsweetened oatmeal. Quinoa. Brown or wild rice.  Meats and other proteins  Seafood. Poultry without skin. Lean cuts of poultry and beef. Tofu. Nuts. Seeds.  Dairy  Low-fat or fat-free dairy products such as milk, yogurt, and cheese.  The items listed above may not be a complete list of foods and beverages you can eat and drink. Contact a dietitian for more information.  What foods should I avoid?  Fruits  Fruits canned with syrup.  Vegetables  Canned vegetables. Frozen vegetables with butter or cream sauce.  Grains  Refined white flour and flour products such as bread, pasta, snack foods, and cereals. Avoid all processed foods.  Meats and other proteins  Fatty cuts of meat. Poultry with skin. Breaded or fried meats. Processed meat. Avoid saturated fats.  Dairy  Full-fat yogurt, cheese, or milk.  Beverages  Sweetened drinks, such as soda or iced tea.  The items listed above may not be a complete list of foods and beverages you should avoid. Contact a dietitian for more information.  Questions to ask a health care provider  Do I need to meet with a certified diabetes care and ?  Do I need to meet with a dietitian?  What number can I call if I have questions?  When are the best times to check my  blood glucose?  Where to find more information:  American Diabetes Association: diabetes.org  Academy of Nutrition and Dietetics: eatright.org  National [place] of Diabetes and Digestive and Kidney Diseases: niddk.nih.gov  Association of Diabetes Care & Education Specialists: diabeteseducator.org  Summary  It is important to have healthy eating habits because your blood sugar (glucose) levels are greatly affected by what you eat and drink. It is important to use alcohol carefully.  A healthy meal plan will help you manage your blood glucose and lower your risk of heart disease.  Your health care provider may recommend that you work with a dietitian to make a meal plan that is best for you.  This information is not intended to replace advice given to you by your health care provider. Make sure you discuss any questions you have with your health care provider.  Document Revised: 2021 Document Reviewed: 2021  [email or web address] Patient Education ©  [employer].    Medicare Wellness  Personal Prevention Plan of Service     Date of Office Visit:    Encounter Provider:  Rojelio Gould MD  Place of Service:  [facility]  Patient Name: Kvng Li  :  1950    As part of the Medicare Wellness portion of your visit today, we are providing you with this personalized preventive plan of services (PPPS). This plan is based upon recommendations of the [place] Preventive Services Task Force (USPSTF) and the Advisory Committee on Immunization Practices (ACIP).    This lists the preventive care services that should be considered, and provides dates of when you are due. Items listed as completed are up-to-date and do not require any further intervention.    Health Maintenance   Topic Date Due    COLORECTAL CANCER SCREENING  2021    ANNUAL WELLNESS VISIT  2023    URINE MICROALBUMIN  2023    COVID-19 Vaccine (4 - Booster for Pfizer series) 2023  (Originally 3/1/2022)    DIABETIC FOOT EXAM  04/13/2023 (Originally 3/8/2023)    HEMOGLOBIN A1C  06/12/2023    DIABETIC EYE EXAM  08/12/2023    LIPID PANEL  12/12/2023    TDAP/TD VACCINES (3 - Td or Tdap) 10/01/2030    HEPATITIS C SCREENING  Completed    INFLUENZA VACCINE  Completed    Pneumococcal Vaccine 65+  Completed    AAA SCREEN (ONE-TIME)  Completed    ZOSTER VACCINE  Discontinued       Orders Placed This Encounter   Procedures    Comprehensive Metabolic Panel     Standing Status:   Future     Order Specific Question:   Release to patient     Answer:   Routine Release    PSA Screen     Standing Status:   Future     Order Specific Question:   Release to patient     Answer:   Routine Release    Hemoglobin A1c     Standing Status:   Future     Order Specific Question:   Release to patient     Answer:   Routine Release    POC Microalbumin     Order Specific Question:   Release to patient     Answer:   Routine Release    CBC & Differential     Standing Status:   Future     Order Specific Question:   Manual Differential     Answer:   No       Return in about 6 months (around 9/29/2023) for Follow-up DM, HTN, HLD, ED.

## 2023-03-29 NOTE — PROGRESS NOTES
The ABCs of the Annual Wellness Visit  Subsequent Medicare Wellness Visit    Subjective    Kvng Li is a 72 y.o. male who presents for a Subsequent Medicare Wellness Visit.    The following portions of the patient's history were reviewed and   updated as appropriate: allergies, current medications, past family history, past medical history, past social history, past surgical history and problem list.    Compared to one year ago, the patient feels his physical   health is worse. Patient has had more aches and pains and has been feeling more tired. He is in PT for back pain and is currently being treated for sinus infection.     Compared to one year ago, the patient feels his mental   health is the same.    Recent Hospitalizations:  He was not admitted to the hospital during the last year.       Current Medical Providers:  Patient Care Team:  Rojelio Gould MD as PCP - General (Family Medicine)  Nayan King DO as Consulting Physician (Pulmonary Disease)  Kush Saunders MD as Consulting Physician (General Surgery)  Yanick Tello MD as Consulting Physician (Orthopedic Surgery)    Outpatient Medications Prior to Visit   Medication Sig Dispense Refill   • albuterol sulfate  (90 Base) MCG/ACT inhaler Inhale 2 puffs Every 4 (Four) Hours As Needed for Wheezing. 18 g 11   • amLODIPine (NORVASC) 10 MG tablet Take 1 tablet by mouth Daily. 90 tablet 3   • atorvastatin (LIPITOR) 40 MG tablet Take 1 tablet by mouth Every Night. 90 tablet 3   • Chlorcyclizine-Pseudoephed (Stahist AD) 25-60 MG tablet Take 1 tablet by mouth 2 (Two) Times a Day for 10 days. 20 tablet 0   • doxycycline (MONODOX) 100 MG capsule Take 1 capsule by mouth 2 (Two) Times a Day for 10 days. 20 capsule 0   • empagliflozin (Jardiance) 25 MG tablet tablet Take 1 tablet by mouth Daily. 90 tablet 3   • escitalopram (LEXAPRO) 10 MG tablet Take 1 tablet by mouth Daily. 90 tablet 3   • fluticasone (Flonase) 50  MCG/ACT nasal spray 2 sprays into the nostril(s) as directed by provider Daily. 16 g 11   • Fluticasone Furoate (Arnuity Ellipta) 100 MCG/ACT aerosol powder  Inhale 1 puff Daily. 30 each 11   • glucose blood test strip 1 each by Other route 4 (Four) Times a Day After Meals & at Bedtime. Use as instructed 100 each 12   • Lancets (freestyle) lancets 1 each by Other route 4 (Four) Times a Day After Meals & at Bedtime. 100 each 12   • metFORMIN ER (GLUCOPHAGE-XR) 500 MG 24 hr tablet Take 2 tablets by mouth 2 (Two) Times a Day With Meals. 180 tablet 3   • methylPREDNISolone (MEDROL) 4 MG dose pack Take as directed on package instructions. 21 tablet 0   • tadalafil (Cialis) 10 MG tablet Take 1 tablet by mouth Daily As Needed for Erectile Dysfunction. 30 tablet 6     Facility-Administered Medications Prior to Visit   Medication Dose Route Frequency Provider Last Rate Last Admin   • albuterol (PROVENTIL) nebulizer solution 0.083% 2.5 mg/3mL  2.5 mg Nebulization Q6H PRN Nayan King DO   2.5 mg at 06/17/22 1550       No opioid medication identified on active medication list. I have reviewed chart for other potential  high risk medication/s and harmful drug interactions in the elderly.          Aspirin is not on active medication list.  Patient does not wish to take an aspirin a day. .    Patient Active Problem List   Diagnosis   • HTN (hypertension)   • HLD (hyperlipidemia)   • JEFE (generalized anxiety disorder)   • DM2 (diabetes mellitus, type 2) ([facility])   • Medicare annual wellness visit, subsequent   • ED (erectile dysfunction)   • Carpal tunnel syndrome, bilateral upper limbs   • Environmental and seasonal allergies   • Mild persistent asthma without complication     Advance Care Planning  Advance Directive is not on file.  ACP discussion was held with the patient during this visit. Patient does not have an advance directive, information provided.     Objective    Vitals:    03/29/23 1323   BP: 120/80  "  Pulse: 65   Temp: 98 °F (36.7 °C)   SpO2: 96%   Weight: 82.6 kg (182 lb)   Height: 179.8 cm (70.8\")   PainSc: 0-No pain     Estimated body mass index is 25.53 kg/m² as calculated from the following:    Height as of this encounter: 179.8 cm (70.8\").    Weight as of this encounter: 82.6 kg (182 lb).    BMI is >= 25 and <30. (Overweight) The following options were offered after discussion;: weight loss educational material (shared in after visit summary), exercise counseling/recommendations and nutrition counseling/recommendations      Does the patient have evidence of cognitive impairment?   No            HEALTH RISK ASSESSMENT    Smoking Status:  Social History     Tobacco Use   Smoking Status Never   • Passive exposure: Never   Smokeless Tobacco Never     Alcohol Consumption:  Social History     Substance and Sexual Activity   Alcohol Use Yes   • Alcohol/week: 1.0 standard drink   • Types: 1 Cans of beer per week     Fall Risk Screen:    VESNAADI Fall Risk Assessment was completed, and patient is at MODERATE risk for falls. Assessment completed on:3/29/2023    Depression Screening:  PHQ-2/PHQ-9 Depression Screening 3/29/2023   Little Interest or Pleasure in Doing Things 0-->not at all   Feeling Down, Depressed or Hopeless 0-->not at all   PHQ-9: Brief Depression Severity Measure Score 0       Health Habits and Functional and Cognitive Screening:  Functional & Cognitive Status 3/29/2023   Do you have difficulty preparing food and eating? No   Do you have difficulty bathing yourself, getting dressed or grooming yourself? No   Do you have difficulty using the toilet? No   Do you have difficulty moving around from place to place? No   Do you have trouble with steps or getting out of a bed or a chair? No   Current Diet Well Balanced Diet   Dental Exam Not up to date        Dental Exam Comment -   Eye Exam Up to date   Exercise (times per week) 4 times per week   Current Exercises Include Walking   Current Exercise " Activities Include -   Do you need help using the phone?  No   Are you deaf or do you have serious difficulty hearing?  No   Do you need help with transportation? No   Do you need help shopping? No   Do you need help preparing meals?  No   Do you need help with housework?  No   Do you need help with laundry? No   Do you need help taking your medications? No   Do you need help managing money? No   Do you ever drive or ride in a car without wearing a seat belt? No   Have you felt unusual stress, anger or loneliness in the last month? No   Who do you live with? Spouse   If you need help, do you have trouble finding someone available to you? No   Have you been bothered in the last four weeks by sexual problems? Yes   Do you have difficulty concentrating, remembering or making decisions? No       Age-appropriate Screening Schedule:  Refer to the list below for future screening recommendations based on patient's age, sex and/or medical conditions. Orders for these recommended tests are listed in the plan section. The patient has been provided with a written plan.    Health Maintenance   Topic Date Due   • COLORECTAL CANCER SCREENING  08/24/2021   • ANNUAL WELLNESS VISIT  03/08/2023   • URINE MICROALBUMIN  03/08/2023   • COVID-19 Vaccine (4 - Booster for Pfizer series) 03/31/2023 (Originally 3/1/2022)   • DIABETIC FOOT EXAM  04/13/2023 (Originally 3/8/2023)   • HEMOGLOBIN A1C  06/12/2023   • DIABETIC EYE EXAM  08/12/2023   • LIPID PANEL  12/12/2023   • TDAP/TD VACCINES (3 - Td or Tdap) 10/01/2030   • HEPATITIS C SCREENING  Completed   • INFLUENZA VACCINE  Completed   • Pneumococcal Vaccine 65+  Completed   • AAA SCREEN (ONE-TIME)  Completed   • ZOSTER VACCINE  Discontinued                CMS Preventative Services Quick Reference  Risk Factors Identified During Encounter:    Fall Risk-High or Moderate: Discussed Fall Prevention in the home  Immunizations Discussed/Encouraged: Shingrix and COVID19    The above risks/problems  have been discussed with the patient.  Pertinent information has been shared with the patient in the After Visit Summary.    Diagnoses and all orders for this visit:    1. Medicare annual wellness visit, subsequent (Primary)  Assessment & Plan:  The patient is here for health maintenance visit.  Currently, the patient consumes a healthy diet and has an adequate exercise regimen.  Screening lab work is ordered.  Immunizations were reviewed today.  Advice and education was given regarding nutrition, aerobic exercise, routine dental evaluations, routine eye exams, reproductive health, cardiovascular risk reduction, sunscreen use, self skin examination (annual dermatology evaluations) and seatbelt use (general overall safety).  Further recommendations will be given if needed after lab evaluation.  Annual wellness evaluation is recommended.      Orders:  -     CBC & Differential; Future  -     Comprehensive Metabolic Panel; Future  -     PSA Screen; Future  -     Hemoglobin A1c; Future  -     Cancel: POC Microalbumin  -     POC Microalbumin; Future    2. Mixed hyperlipidemia  -     CBC & Differential; Future  -     Comprehensive Metabolic Panel; Future    3. Primary hypertension  -     CBC & Differential; Future  -     Comprehensive Metabolic Panel; Future  -     Hemoglobin A1c; Future  -     Cancel: POC Microalbumin  -     POC Microalbumin; Future    4. Type 2 diabetes mellitus without complication, with long-term current use of insulin (HCC)  -     CBC & Differential; Future  -     Comprehensive Metabolic Panel; Future  -     Hemoglobin A1c; Future  -     Cancel: POC Microalbumin  -     POC Microalbumin; Future    5. Encounter for screening for malignant neoplasm of prostate  -     PSA Screen; Future      Follow Up:   Next Medicare Wellness visit to be scheduled in 1 year.      An After Visit Summary and PPPS were made available to the patient.

## 2023-03-30 LAB
ALBUMIN SERPL-MCNC: 4.7 G/DL (ref 3.5–5.2)
ALBUMIN/GLOB SERPL: 1.8 G/DL
ALP SERPL-CCNC: 52 U/L (ref 39–117)
ALT SERPL-CCNC: 10 U/L (ref 1–41)
AST SERPL-CCNC: 15 U/L (ref 1–40)
BASOPHILS # BLD AUTO: 0.02 10*3/MM3 (ref 0–0.2)
BASOPHILS NFR BLD AUTO: 0.1 % (ref 0–1.5)
BILIRUB SERPL-MCNC: 0.3 MG/DL (ref 0–1.2)
BUN SERPL-MCNC: 21 MG/DL (ref 8–23)
BUN/CREAT SERPL: 25.9 (ref 7–25)
CALCIUM SERPL-MCNC: 9.8 MG/DL (ref 8.6–10.5)
CHLORIDE SERPL-SCNC: 102 MMOL/L (ref 98–107)
CO2 SERPL-SCNC: 26.4 MMOL/L (ref 22–29)
CREAT SERPL-MCNC: 0.81 MG/DL (ref 0.76–1.27)
EGFRCR SERPLBLD CKD-EPI 2021: 93.1 ML/MIN/1.73
EOSINOPHIL # BLD AUTO: 0.05 10*3/MM3 (ref 0–0.4)
EOSINOPHIL NFR BLD AUTO: 0.4 % (ref 0.3–6.2)
ERYTHROCYTE [DISTWIDTH] IN BLOOD BY AUTOMATED COUNT: 12.5 % (ref 12.3–15.4)
GLOBULIN SER CALC-MCNC: 2.6 GM/DL
GLUCOSE SERPL-MCNC: 139 MG/DL (ref 65–99)
HBA1C MFR BLD: 7.2 % (ref 4.8–5.6)
HCT VFR BLD AUTO: 43.8 % (ref 37.5–51)
HGB BLD-MCNC: 15.2 G/DL (ref 13–17.7)
IMM GRANULOCYTES # BLD AUTO: 0.05 10*3/MM3 (ref 0–0.05)
IMM GRANULOCYTES NFR BLD AUTO: 0.4 % (ref 0–0.5)
LYMPHOCYTES # BLD AUTO: 1.41 10*3/MM3 (ref 0.7–3.1)
LYMPHOCYTES NFR BLD AUTO: 10.2 % (ref 19.6–45.3)
MCH RBC QN AUTO: 30.5 PG (ref 26.6–33)
MCHC RBC AUTO-ENTMCNC: 34.7 G/DL (ref 31.5–35.7)
MCV RBC AUTO: 87.8 FL (ref 79–97)
MONOCYTES # BLD AUTO: 0.54 10*3/MM3 (ref 0.1–0.9)
MONOCYTES NFR BLD AUTO: 3.9 % (ref 5–12)
NEUTROPHILS # BLD AUTO: 11.77 10*3/MM3 (ref 1.7–7)
NEUTROPHILS NFR BLD AUTO: 85 % (ref 42.7–76)
NRBC BLD AUTO-RTO: 0 /100 WBC (ref 0–0.2)
PLATELET # BLD AUTO: 285 10*3/MM3 (ref 140–450)
POTASSIUM SERPL-SCNC: 4.3 MMOL/L (ref 3.5–5.2)
PROT SERPL-MCNC: 7.3 G/DL (ref 6–8.5)
PSA SERPL-MCNC: 0.68 NG/ML (ref 0–4)
RBC # BLD AUTO: 4.99 10*6/MM3 (ref 4.14–5.8)
SODIUM SERPL-SCNC: 139 MMOL/L (ref 136–145)
WBC # BLD AUTO: 13.84 10*3/MM3 (ref 3.4–10.8)

## 2023-05-22 ENCOUNTER — OFFICE VISIT (OUTPATIENT)
Dept: PULMONOLOGY | Facility: CLINIC | Age: 73
End: 2023-05-22
Payer: MEDICARE

## 2023-05-22 VITALS
BODY MASS INDEX: 25.34 KG/M2 | SYSTOLIC BLOOD PRESSURE: 122 MMHG | HEART RATE: 71 BPM | OXYGEN SATURATION: 95 % | WEIGHT: 181 LBS | TEMPERATURE: 97.7 F | DIASTOLIC BLOOD PRESSURE: 80 MMHG | HEIGHT: 71 IN

## 2023-05-22 DIAGNOSIS — J45.30 MILD PERSISTENT ASTHMA WITHOUT COMPLICATION: Primary | ICD-10-CM

## 2023-05-22 DIAGNOSIS — J30.89 ENVIRONMENTAL AND SEASONAL ALLERGIES: ICD-10-CM

## 2023-05-22 PROCEDURE — 1160F RVW MEDS BY RX/DR IN RCRD: CPT | Performed by: NURSE PRACTITIONER

## 2023-05-22 PROCEDURE — 3074F SYST BP LT 130 MM HG: CPT | Performed by: NURSE PRACTITIONER

## 2023-05-22 PROCEDURE — 3079F DIAST BP 80-89 MM HG: CPT | Performed by: NURSE PRACTITIONER

## 2023-05-22 PROCEDURE — 99214 OFFICE O/P EST MOD 30 MIN: CPT | Performed by: NURSE PRACTITIONER

## 2023-05-22 PROCEDURE — 1159F MED LIST DOCD IN RCRD: CPT | Performed by: NURSE PRACTITIONER

## 2023-05-22 RX ORDER — MONTELUKAST SODIUM 10 MG/1
10 TABLET ORAL NIGHTLY
Qty: 90 TABLET | Refills: 3 | Status: SHIPPED | OUTPATIENT
Start: 2023-05-22

## 2023-05-22 RX ORDER — FLUTICASONE PROPIONATE 50 MCG
2 SPRAY, SUSPENSION (ML) NASAL DAILY
Qty: 16 G | Refills: 11 | Status: SHIPPED | OUTPATIENT
Start: 2023-05-22

## 2023-05-22 NOTE — PROGRESS NOTES
[facility] Pulmonary Follow up    CHIEF COMPLAINT    Sinus congestion/congestion    HISTORY OF PRESENT ILLNESS    Kvng Li is a 72 y.o.male here today for follow-up.  He was last seen in the office by Dr. King in September.  He states he has noticed more sinus drainage over the last couple of months due to the season change.    He has noticed more sinus drainage and congestion.  He states he has difficulties lying flat at night due to not being able to breathe out of his nose.  He does have drainage during the day.  He is currently using his Flonase but does not use it on a regular basis.  He is not taking an antihistamine.  He also has an Astelin nasal spray but does not use it.    He continues to use the Arnuity daily.  He does not feel like it is being as effective as it used to.  He has the albuterol rescue inhaler and has been using that on a daily basis for the last several weeks.    He denies any sputum production or hemoptysis.  He denies any chest pain or palpitations.  He denies any lower extremity edema or calf tenderness.    He denies any reflux symptoms.    He has seen in the ENT earlier this year and was recommended to have a sinus surgery, he did not want to proceed with the surgery at that time.    He is a lifetime non-smoker.    Patient Active Problem List   Diagnosis   • HTN (hypertension)   • HLD (hyperlipidemia)   • JEFE (generalized anxiety disorder)   • DM2 (diabetes mellitus, type 2)   • Medicare annual wellness visit, subsequent   • ED (erectile dysfunction)   • Carpal tunnel syndrome, bilateral upper limbs   • Environmental and seasonal allergies   • Mild persistent asthma without complication       No Known Allergies    Current Outpatient Medications:   •  albuterol sulfate  (90 Base) MCG/ACT inhaler, Inhale 2 puffs Every 4 (Four) Hours As Needed for Wheezing., Disp: 18 g, Rfl: 11  •  amLODIPine (NORVASC) 10 MG tablet, Take 1 tablet by mouth Daily., Disp: 90 tablet, Rfl: 3  •   atorvastatin (LIPITOR) 40 MG tablet, Take 1 tablet by mouth Every Night., Disp: 90 tablet, Rfl: 3  •  empagliflozin (Jardiance) 25 MG tablet tablet, Take 1 tablet by mouth Daily., Disp: 90 tablet, Rfl: 3  •  escitalopram (LEXAPRO) 10 MG tablet, Take 1 tablet by mouth Daily., Disp: 90 tablet, Rfl: 3  •  fluticasone (Flonase) 50 MCG/ACT nasal spray, 2 sprays into the nostril(s) as directed by provider Daily., Disp: 16 g, Rfl: 11  •  Fluticasone Furoate (Arnuity Ellipta) 100 MCG/ACT aerosol powder , Inhale 1 puff Daily., Disp: 30 each, Rfl: 11  •  glucose blood test strip, 1 each by Other route 4 (Four) Times a Day After Meals & at Bedtime. Use as instructed, Disp: 100 each, Rfl: 12  •  Lancets (freestyle) lancets, 1 each by Other route 4 (Four) Times a Day After Meals & at Bedtime., Disp: 100 each, Rfl: 12  •  metFORMIN ER (GLUCOPHAGE-XR) 500 MG 24 hr tablet, Take 2 tablets by mouth 2 (Two) Times a Day With Meals., Disp: 180 tablet, Rfl: 3  •  tadalafil (Cialis) 10 MG tablet, Take 1 tablet by mouth Daily As Needed for Erectile Dysfunction., Disp: 30 tablet, Rfl: 6  •  montelukast (SINGULAIR) 10 MG tablet, Take 1 tablet by mouth Every Night., Disp: 90 tablet, Rfl: 3    Current Facility-Administered Medications:   •  albuterol (PROVENTIL) nebulizer solution 0.083% 2.5 mg/3mL, 2.5 mg, Nebulization, Q6H PRN, Nayan King, DO, 2.5 mg at 06/17/22 1550  MEDICATION LIST AND ALLERGIES REVIEWED.    Social History     Tobacco Use   • Smoking status: Never     Passive exposure: Never   • Smokeless tobacco: Never   Vaping Use   • Vaping Use: Never used   Substance Use Topics   • Alcohol use: Yes     Alcohol/week: 1.0 standard drink     Types: 1 Cans of beer per week   • Drug use: Never       FAMILY AND SOCIAL HISTORY REVIEWED.    Review of Systems   Constitutional: Positive for fatigue. Negative for activity change, appetite change, fever and unexpected weight change.   HENT: Positive for congestion, postnasal drip  "and rhinorrhea. Negative for sinus pressure, sore throat and voice change.    Eyes: Negative for visual disturbance.   Respiratory: Positive for shortness of breath. Negative for cough, chest tightness and wheezing.    Cardiovascular: Negative for chest pain, palpitations and leg swelling.   Gastrointestinal: Negative for abdominal distention, abdominal pain, nausea and vomiting.   Endocrine: Negative for cold intolerance and heat intolerance.   Genitourinary: Negative for difficulty urinating and urgency.   Musculoskeletal: Negative for arthralgias, back pain and neck pain.   Skin: Negative for color change and pallor.   Allergic/Immunologic: Negative for environmental allergies and food allergies.   Neurological: Negative for dizziness, syncope, weakness and light-headedness.   Hematological: Negative for adenopathy. Does not bruise/bleed easily.   Psychiatric/Behavioral: Negative for agitation and behavioral problems.   .    /80 (BP Location: Left arm, Patient Position: Sitting, Cuff Size: Adult)   Pulse 71   Temp 97.7 °F (36.5 °C) (Infrared)   Ht 179.8 cm (70.8\")   Wt 82.1 kg (181 lb)   SpO2 95% Comment: resting room air  BMI 25.39 kg/m²     Immunization History   Administered Date(s) Administered   • COVID-19 (PFIZER) Purple Cap Monovalent 02/17/2021, 03/10/2021, 01/04/2022   • Fluad Quad 65+ 10/01/2020, 12/12/2022   • Fluzone High Dose =>65 Years ([facility] ONLY) 02/20/2019, 10/28/2019   • Fluzone High-Dose 65+yrs 10/25/2021   • Influenza Quad Vaccine (Inpatient) 11/17/2010   • Pneumococcal Conjugate 13-Valent (PCV13) 02/08/2017, 02/08/2017   • Pneumococcal Polysaccharide (PPSV23) 08/07/2018   • Td 10/01/2020   • Tdap 08/03/2006       Physical Exam  Vitals and nursing note reviewed.   Constitutional:       Appearance: He is well-developed. He is not diaphoretic.   HENT:      Head: Normocephalic and atraumatic.   Eyes:      Pupils: Pupils are equal, round, and reactive to light.   Neck:      Thyroid: " No thyromegaly.   Cardiovascular:      Rate and Rhythm: Normal rate and regular rhythm.      Heart sounds: Normal heart sounds. No murmur heard.    No friction rub. No gallop.   Pulmonary:      Effort: Pulmonary effort is normal. No respiratory distress.      Breath sounds: Normal breath sounds. No wheezing or rales.   Chest:      Chest wall: No tenderness.   Abdominal:      General: Bowel sounds are normal.      Palpations: Abdomen is soft.      Tenderness: There is no abdominal tenderness.   Musculoskeletal:         General: No swelling. Normal range of motion.      Cervical back: Normal range of motion and neck supple.   Lymphadenopathy:      Cervical: No cervical adenopathy.   Skin:     General: Skin is warm and dry.      Capillary Refill: Capillary refill takes less than 2 seconds.   Neurological:      Mental Status: He is alert and oriented to person, place, and time.   Psychiatric:         Behavior: Behavior normal.           RESULTS      PROBLEM LIST    Problem List Items Addressed This Visit        Allergies and Adverse Reactions    Environmental and seasonal allergies    Relevant Medications    fluticasone (Flonase) 50 MCG/ACT nasal spray    montelukast (SINGULAIR) 10 MG tablet       Pulmonary and Pneumonias    Mild persistent asthma without complication - Primary    Relevant Medications    montelukast (SINGULAIR) 10 MG tablet         DISCUSSION    Mr. Li was here for follow-up of his congestion.  He states that he has noticed more congestion over the last few weeks.  He is concerned that his inhalers may not be working effectively.    For now would like for him to continue the Arnuity daily.  I also encouraged him to use the albuterol as needed for shortness of breath.    I do suspect that some of his difficulties are due to his seasonal allergies and nasal congestion.  I would like for him to use the Flonase and Astelin nasal sprays daily for the next 30 days.  I would also like to add Singulair to  his regimen.  If he notices an improvement with these 3 things in the next 30 days and he can continue this.    I would like to have him follow-up in 4 weeks with full PFTs to see if his inhalers need to be changed.  I did advise him if he was feeling better he can cancel this appointment and will reschedule his breathing test for September with his yearly visit.    I personally spent a total of 32 minutes on patient visit today including chart review, face to face with the patient obtaining the history and physical exam, review of pertinent images and tests, counseling and discussion and/or coordination of care as described above, and documentation.  Total time excludes time spent on other separate services such as performing procedures or test interpretation, if applicable.        Brandy Esqueda, APRN  05/22/202315:17 EDT  Electronically signed     Please note that portions of this note were completed with a voice recognition program.        CC: Rojelio Gould MD

## 2023-08-03 DIAGNOSIS — E11.9 TYPE 2 DIABETES MELLITUS WITHOUT COMPLICATION, WITH LONG-TERM CURRENT USE OF INSULIN: ICD-10-CM

## 2023-08-03 DIAGNOSIS — Z79.4 TYPE 2 DIABETES MELLITUS WITHOUT COMPLICATION, WITH LONG-TERM CURRENT USE OF INSULIN: ICD-10-CM

## 2023-08-03 DIAGNOSIS — E78.2 MIXED HYPERLIPIDEMIA: ICD-10-CM

## 2023-08-03 RX ORDER — METFORMIN HYDROCHLORIDE 500 MG/1
TABLET, EXTENDED RELEASE ORAL
Qty: 360 TABLET | Refills: 0 | Status: SHIPPED | OUTPATIENT
Start: 2023-08-03

## 2023-08-03 RX ORDER — ATORVASTATIN CALCIUM 40 MG/1
40 TABLET, FILM COATED ORAL NIGHTLY
Qty: 90 TABLET | Refills: 3 | Status: SHIPPED | OUTPATIENT
Start: 2023-08-03

## 2023-10-30 DIAGNOSIS — Z79.4 TYPE 2 DIABETES MELLITUS WITHOUT COMPLICATION, WITH LONG-TERM CURRENT USE OF INSULIN: ICD-10-CM

## 2023-10-30 DIAGNOSIS — E11.9 TYPE 2 DIABETES MELLITUS WITHOUT COMPLICATION, WITH LONG-TERM CURRENT USE OF INSULIN: ICD-10-CM

## 2023-10-30 RX ORDER — METFORMIN HYDROCHLORIDE 500 MG/1
TABLET, EXTENDED RELEASE ORAL
Qty: 360 TABLET | Refills: 1 | Status: SHIPPED | OUTPATIENT
Start: 2023-10-30

## 2023-11-29 DIAGNOSIS — F41.1 GAD (GENERALIZED ANXIETY DISORDER): ICD-10-CM

## 2023-11-29 RX ORDER — ESCITALOPRAM OXALATE 10 MG/1
10 TABLET ORAL DAILY
Qty: 90 TABLET | Refills: 3 | Status: SHIPPED | OUTPATIENT
Start: 2023-11-29

## 2024-01-09 DIAGNOSIS — E11.9 TYPE 2 DIABETES MELLITUS WITHOUT COMPLICATION, WITH LONG-TERM CURRENT USE OF INSULIN: ICD-10-CM

## 2024-01-09 DIAGNOSIS — Z79.4 TYPE 2 DIABETES MELLITUS WITHOUT COMPLICATION, WITH LONG-TERM CURRENT USE OF INSULIN: ICD-10-CM

## 2024-01-09 RX ORDER — EMPAGLIFLOZIN 25 MG/1
25 TABLET, FILM COATED ORAL DAILY
Qty: 90 TABLET | Refills: 3 | Status: SHIPPED | OUTPATIENT
Start: 2024-01-09

## 2024-01-09 NOTE — TELEPHONE ENCOUNTER
Rx Refill Note  Requested Prescriptions     Pending Prescriptions Disp Refills    Jardiance 25 MG tablet tablet [Pharmacy Med Name: JARDIANCE 25 MG TABLET] 30 tablet      Sig: TAKE ONE TABLET BY MOUTH DAILY      Last office visit with prescribing clinician: 3/29/2023   Last telemedicine visit with prescribing clinician: Visit date not found   Next office visit with prescribing clinician: 4/5/2024                         Would you like a call back once the refill request has been completed: [] Yes [] No    If the office needs to give you a call back, can they leave a voicemail: [] Yes [] No    Randa Tatum MA  01/09/24, 11:41 EST

## 2024-01-18 ENCOUNTER — TRANSCRIBE ORDERS (OUTPATIENT)
Dept: ADMINISTRATIVE | Facility: HOSPITAL | Age: 74
End: 2024-01-18
Payer: MEDICARE

## 2024-01-18 DIAGNOSIS — I73.9 PERIPHERAL VASCULAR DISEASE, UNSPECIFIED: ICD-10-CM

## 2024-01-18 DIAGNOSIS — M79.605 LEFT LEG PAIN: Primary | ICD-10-CM

## 2024-01-24 ENCOUNTER — TRANSCRIBE ORDERS (OUTPATIENT)
Dept: ADMINISTRATIVE | Facility: HOSPITAL | Age: 74
End: 2024-01-24
Payer: MEDICARE

## 2024-01-24 DIAGNOSIS — I73.9 PERIPHERAL VASCULAR DISEASE: Primary | ICD-10-CM

## 2024-02-25 DIAGNOSIS — I10 PRIMARY HYPERTENSION: ICD-10-CM

## 2024-02-26 RX ORDER — AMLODIPINE BESYLATE 10 MG/1
10 TABLET ORAL DAILY
Qty: 90 TABLET | Refills: 3 | Status: SHIPPED | OUTPATIENT
Start: 2024-02-26

## 2024-02-26 NOTE — TELEPHONE ENCOUNTER
Rx Refill Note  Requested Prescriptions     Pending Prescriptions Disp Refills    amLODIPine (NORVASC) 10 MG tablet [Pharmacy Med Name: amLODIPine BESYLATE 10 MG TAB] 90 tablet 3     Sig: TAKE ONE TABLET BY MOUTH DAILY      Last office visit with prescribing clinician: 3/29/2023   Last telemedicine visit with prescribing clinician: Visit date not found   Next office visit with prescribing clinician: 4/5/2024                         Would you like a call back once the refill request has been completed: [] Yes [] No    If the office needs to give you a call back, can they leave a voicemail: [] Yes [] No    Randa Tatum MA  02/26/24, 12:54 EST

## 2024-10-10 ENCOUNTER — TREATMENT (OUTPATIENT)
Dept: PHYSICAL THERAPY | Facility: CLINIC | Age: 74
End: 2024-10-10
Payer: MEDICARE

## 2024-10-10 DIAGNOSIS — M25.531 RIGHT WRIST PAIN: ICD-10-CM

## 2024-10-10 DIAGNOSIS — Z47.89 ORTHOPEDIC AFTERCARE: ICD-10-CM

## 2024-10-10 DIAGNOSIS — G56.01 CARPAL TUNNEL SYNDROME OF RIGHT WRIST: Primary | ICD-10-CM

## 2024-10-10 NOTE — PROGRESS NOTES
Physical Therapy Initial Evaluation and Plan of Care  [facility] [place]  [address] [facility], [address] [address]        Patient: Kvng Li   : 1950  Diagnosis/ICD-10 Code:  Carpal tunnel syndrome of right wrist [G56.01]  Referring practitioner: BLADIMIR Whitten  Date of Initial Visit: 10/10/2024  Today's Date: 10/10/2024  Patient seen for 1 sessions         Visit Diagnoses:    ICD-10-CM ICD-9-CM   1. Carpal tunnel syndrome of right wrist  G56.01 354.0   2. Right wrist pain  M25.531 719.43   3. Orthopedic aftercare  Z47.89 V54.9         Subjective Questionnaire: QuickDASH: 65.91%    Subjective Evaluation    History of Present Illness  Onset date: Years of symptoms.  Date of surgery: 2024  Mechanism of injury: History of bilateral CTS, Positive NCS/EMG studies.  Underwent CTR surgery on right.          Patient Occupation: Retired Pain  Current pain ratin  At best pain ratin  At worst pain ratin  Location: Post surgery pain, hand feels worse post surgery.  Still experiencing aching/tingling at night.  Fingers feel swollen and are very stiff.  Quality: dull ache, needle-like, pulling, pressure and tight  Relieving factors: rest  Aggravating factors: sleeping, lifting, repetitive movement and outstretched reach  Progression: no change    Hand dominance: left    Diagnostic Tests  EMG: abnormal           Objective          Observations     Right Wrist/Hand   Positive for atrophy (Thenar emenience), Maryellen's nodes, Heberden's nodes and incision.       Neurological Testing     Sensation     Wrist/Hand     Right   Paresthesia: light touch  Absent: pin prick    Additional Neurological Details  Thumb/Long finger thenar eminence: Orange=300 grams  Index finger:  red=4 grams  Ring finger: Blue  Small finger and hypothenar eminence:  green  Scar area:  Purple    Active Range of Motion   Cervical/Thoracic Spine   Normal active range of motion    Right Shoulder   Normal  active range of motion    Right Elbow   Normal active range of motion    Right Wrist   Wrist flexion: 39 degrees with pain  Wrist extension: 68 degrees   Radial deviation: 20 degrees   Ulnar deviation: 40 degrees     Right Thumb   Flexion     MP: 60 degrees    DIP: 64 degrees  Extension     MP: 0 degrees    DIP: 0 degrees    Additional Active Range of Motion Details  Finger tips to palm=3 cm    Strength/Myotome Testing     Left Wrist/Hand      (2nd hand position)   Left  strength (2nd hand position) 92 lbs    Thumb Strength  Key/Lateral Pinch     Trial 1: 28 lbs  Palmar/Three-Point Pinch     Trial 1: 27 lbs    Right Wrist/Hand   Wrist extension: 4-  Wrist flexion: 4     (2nd hand position)   Right  strength (2nd hand position) 28 lbs    Thumb Strength   Key/Lateral Pinch     Trial 1: 20 lbs  Palmar/Three-Point Pinch     Trial 1: 18 lbs    Tests   Cervical     Right   Positive ULTT1 and ULTT2.     Right Wrist/Hand   Positive extrinsic extensor tightness, intrinsic muscle tightness, Phalen's sign and Tinel's sign (medial nerve).           Assessment & Plan       Assessment  Impairments: abnormal muscle firing, abnormal muscle tone, abnormal or restricted ROM, activity intolerance, impaired physical strength and pain with function   Functional limitations: lifting, pulling, pushing, uncomfortable because of pain and unable to perform repetitive tasks   Assessment details: Pt. is a 74 year old left handed male with a non-work related injury.  He has long standing repetitive stress injury to the right & left carpal tunnel. He is S/P right CTR on 9/24/2024.  He is still experiencing some moderate surgical pain on the right with continued severe paraesthesia/anaesthesia along median nerve distribution. At this point he has lost protective sensation along most of the median nerve distribution.  He is getting median nerve neural tension symptoms again, scar is tender & Hypomobile.  Swelling in IP/MCP joint  seem to limiting some of her finger ROM. Problems:  discomfort, decreased strength, tender incision, positive neural tension, and decrease functional activities.   Prognosis: good  Prognosis details: Progress will be slow.  I believe this patient has had significant median nerve damage with possible nerve apraxia present.    Goals  Plan Goals: STG to be met in 4 weeks  1. Reduce scar tenderness so that pt. can  35 lbs with tolerable pain.   2. Pt has 50% reduction in neural tension of right UE.  3. Pt. Is able to make full composite fist.  4. Wrist AROM is WNL.  5.  Patient has improved upper extremity function so that QuickDASH score improves to 50%.  LTG to be met in 8 weeks  1. Pt. Independent with strengthening HEP.  2. Pt. Able to weight-bear through right upper extremity without significant neural tension discomfort.  3. Increase  strength to 45 lbs.  4. Pt has improved UE symptoms so that Quick Dash score improves 25%.      Plan  Therapy options: will be seen for skilled therapy services  Planned modality interventions: dry needling, TENS, ultrasound, high voltage pulsed current (spasm management), high voltage pulsed current (pain management), hydrotherapy and thermotherapy (paraffin bath)  Planned therapy interventions: fine motor coordination training, flexibility, functional ROM exercises, home exercise program, joint mobilization, therapeutic activities, stretching, strengthening, soft tissue mobilization, orthotic fitting/training, neuromuscular re-education and manual therapy  Frequency: 2x week  Duration in weeks: 8        Access Code: [email or web address]  URL: [email or web address]  Date: 10/10/2024  Prepared by: Gurmeet Jimenez    Exercises  - Wrist Tendon Gliding  - 4 x daily - 7 x weekly - 1 sets - 15 reps  - Median Nerve Tensioner  - 4 x daily - 7 x weekly - 1 sets - 10 reps  - Median Nerve Glide  - 4 x daily - 7 x weekly - 1 sets - 10 reps  Timed:         Manual Therapy:         mins   54423;     Therapeutic Exercise:         mins  69989;     Neuromuscular Rodrigo:    10    mins  23805;    Therapeutic Activity:     18     mins  34133;     Gait Training:           mins  87533;     Ultrasound:          mins  54477;    Ionto                                   mins   68652  Self Care                            mins   92298  Canalith Repos         mins 27704      Un-Timed:  Electrical Stimulation:         mins  48637 ( );  Dry Needling          mins self-pay  Traction          mins 58653  Low Eval          Mins  12456  Mod Eval     30     Mins  28297  High Eval                            Mins  17465        Timed Treatment:   28   mins   Total Treatment:     58   mins          PT: Gurmeet Jimenez PT,[facility]     License Number: [id number]  Electronically signed by Gurmeet Jimenez PT, 10/10/24, 10:00 AM EDT    Medicare Initial Certification  Certification Period: 1/8/2025  I certify that the therapy services are furnished while this patient is under my care.  The services outlined above are required by this patient, and will be reviewed every 90 days.     PHYSICIAN: ________________________________________________________  Rafy Rivera PA        DATE: ____________________________________________________________    Please sign and return via fax to [phone].. Thank you, [facility].

## 2024-10-14 ENCOUNTER — TREATMENT (OUTPATIENT)
Dept: PHYSICAL THERAPY | Facility: CLINIC | Age: 74
End: 2024-10-14
Payer: MEDICARE

## 2024-10-14 DIAGNOSIS — G56.01 CARPAL TUNNEL SYNDROME OF RIGHT WRIST: Primary | ICD-10-CM

## 2024-10-14 DIAGNOSIS — M25.531 RIGHT WRIST PAIN: ICD-10-CM

## 2024-10-14 DIAGNOSIS — Z47.89 ORTHOPEDIC AFTERCARE: ICD-10-CM

## 2024-10-14 PROCEDURE — 97035 APP MDLTY 1+ULTRASOUND EA 15: CPT | Performed by: PHYSICAL THERAPIST

## 2024-10-14 PROCEDURE — 97110 THERAPEUTIC EXERCISES: CPT | Performed by: PHYSICAL THERAPIST

## 2024-10-14 PROCEDURE — 97140 MANUAL THERAPY 1/> REGIONS: CPT | Performed by: PHYSICAL THERAPIST

## 2024-10-15 NOTE — PROGRESS NOTES
Physical Therapy Daily Treatment Note  [facility] [place]  [address] [email or web address] [address]  [facility] [address]      Patient: Kvng Li   : 1950  Diagnosis/ICD-10 Code:  Carpal tunnel syndrome of right wrist [G56.01]  Referring practitioner: No ref. provider found  Date of Initial Visit: Type: THERAPY  Noted: 10/10/2024  Today's Date: 10/14/2024  Patient seen for 2 sessions         Visit Diagnoses:    ICD-10-CM ICD-9-CM   1. Carpal tunnel syndrome of right wrist  G56.01 354.0   2. Right wrist pain  M25.531 719.43   3. Orthopedic aftercare  Z47.89 V54.9       Subjective   Kvng Li reports: Hand is sore.  Did a lot of yard work including use of lawnmower.  Did use Impact gloves that were padded which she found to be helpful.  Hand does feel very weak.    Objective   OBSERVATION: Surgical wound closed.  Scar slightly elevated.  PALPATION: Carpal tunnel release scar hypomobile and tender.  ROM: Wrist flexion: 44 degrees with pain  Wrist extension: 65 degrees   OTHER: Neurotension of the median nerve throughout the right upper extremity least 25% less severe than last week.    See Exercise, Manual, and Modality Logs for complete treatment.       Assessment/Plan  Status post right carpal tunnel release after many years of compression.  Range of motion of the wrist appears to be improving.  Tolerating more functional activities with the hand.  Scar hypomobility is of concern.  Progress per Plan of Care and Progress strengthening /stabilization /functional activity           Timed:         Manual Therapy:    20     mins  60580;     Therapeutic Exercise:    16     mins  22080;     Neuromuscular Rodrigo:        mins  94488;    Therapeutic Activity:          mins  58154;     Gait Training:           mins  80952;     Ultrasound:     13     mins  42492;    Ionto                                   mins   17637  Self Care                            mins   36153  [facility]         mins  57179      Un-Timed:  Electrical Stimulation:         mins  01355 ( );  Dry Needling          mins self-pay  Traction          mins 15423      Timed Treatment:   49   mins   Total Treatment:     49   mins    Gurmeet Jimenez PT, T  [place] License: [id number]

## 2024-10-17 ENCOUNTER — TREATMENT (OUTPATIENT)
Dept: PHYSICAL THERAPY | Facility: CLINIC | Age: 74
End: 2024-10-17
Payer: MEDICARE

## 2024-10-17 DIAGNOSIS — M25.531 RIGHT WRIST PAIN: ICD-10-CM

## 2024-10-17 DIAGNOSIS — Z47.89 ORTHOPEDIC AFTERCARE: ICD-10-CM

## 2024-10-17 DIAGNOSIS — G56.01 CARPAL TUNNEL SYNDROME OF RIGHT WRIST: Primary | ICD-10-CM

## 2024-10-18 NOTE — PROGRESS NOTES
Physical Therapy Daily Treatment Note  [facility] [place]  [address] [facility] [facility] [address]  [facility] [address]      Patient: Kvng Li   : 1950  Diagnosis/ICD-10 Code:  Carpal tunnel syndrome of right wrist [G56.01]  Referring practitioner: No ref. provider found  Date of Initial Visit: Type: THERAPY  Noted: 10/10/2024  Today's Date: 10/17/2024  Patient seen for 3 sessions         Visit Diagnoses:    ICD-10-CM ICD-9-CM   1. Carpal tunnel syndrome of right wrist  G56.01 354.0   2. Right wrist pain  M25.531 719.43   3. Orthopedic aftercare  Z47.89 V54.9       Subjective   Kvng Li reports: Maybe slight improvement in the hand minimal at best.  Long finger is still painful and very stiff to move.  The surgical site feels thick and painful.    Objective   OBSERVATION: Surgical wound closed.  Scar slightly elevated.  PALPATION: Carpal tunnel release scar hypomobile and tender.  ROM: Wrist flexion: 47 degrees with pain  Wrist extension: 65 degrees   OTHER: Neurotension of the median nerve throughout the right upper extremity least 25% less severe than last week.    See Exercise, Manual, and Modality Logs for complete treatment.       Assessment/Plan  Status post right carpal tunnel release after many years of compression.  Scar area remains very tender.  Scars mild to moderately hypomobile.  Still believe the patient went extended period time with median nerve compression, years of median nerve compression, so it is going to take an extended period of time before we can assess recovery outcomes.  Progress per Plan of Care and Progress strengthening /stabilization /functional activity           Timed:         Manual Therapy:    23     mins  56962;     Therapeutic Exercise:    17     mins  72643;     Neuromuscular Rodrigo:        mins  03461;    Therapeutic Activity:          mins  05233;     Gait Training:           mins  65149;     Ultrasound:     13     mins  84479;    Ionto                                    mins   85727  Self Care                            mins   48462  Canalith Repos         mins 37714      Un-Timed:  Electrical Stimulation:         mins  35455 ( );  Dry Needling          mins self-pay  Traction          mins 40930      Timed Treatment:   53   mins   Total Treatment:     53   mins    Gurmeet Jimenez PT, CHT  [place] License: [id number]

## 2024-10-23 ENCOUNTER — TREATMENT (OUTPATIENT)
Dept: PHYSICAL THERAPY | Facility: CLINIC | Age: 74
End: 2024-10-23
Payer: MEDICARE

## 2024-10-23 DIAGNOSIS — G56.01 CARPAL TUNNEL SYNDROME OF RIGHT WRIST: Primary | ICD-10-CM

## 2024-10-23 DIAGNOSIS — M25.531 RIGHT WRIST PAIN: ICD-10-CM

## 2024-10-23 DIAGNOSIS — Z47.89 ORTHOPEDIC AFTERCARE: ICD-10-CM

## 2024-10-25 ENCOUNTER — TREATMENT (OUTPATIENT)
Dept: PHYSICAL THERAPY | Facility: CLINIC | Age: 74
End: 2024-10-25
Payer: MEDICARE

## 2024-10-25 DIAGNOSIS — Z47.89 ORTHOPEDIC AFTERCARE: ICD-10-CM

## 2024-10-25 DIAGNOSIS — M25.531 RIGHT WRIST PAIN: ICD-10-CM

## 2024-10-25 DIAGNOSIS — G56.01 CARPAL TUNNEL SYNDROME OF RIGHT WRIST: Primary | ICD-10-CM

## 2024-10-25 NOTE — PROGRESS NOTES
Physical Therapy Daily Treatment Note  [facility] [place] [facility] [place]  [address] [facility], [facility] [address]  [facility] [address]      Patient: Kvng Li   : 1950  Diagnosis/ICD-10 Code:  Carpal tunnel syndrome of right wrist [G56.01]  Referring practitioner: BLADIMIR Whitten  Date of Initial Visit: Type: THERAPY  Noted: 10/10/2024  Today's Date: 10/25/2024  Patient seen for 5 sessions         Visit Diagnoses:    ICD-10-CM ICD-9-CM   1. Carpal tunnel syndrome of right wrist  G56.01 354.0   2. Right wrist pain  M25.531 719.43   3. Orthopedic aftercare  Z47.89 V54.9       Subjective   Kvng Li reports: Has had a lot of pain into the long finger over the last couple days.  At has been using his lawnmower.  Does use Impact glove but anytime he uses the lawnmower his symptoms worsen.  Surgery site has minimal discomfort.  Most of the discomfort is into the long finger and it still keeps him awake at times.  The e-stim was helpful last treatment.    Objective   OBSERVATION: Scar slightly elevated.  PALPATION: Mild tenderness at the volar wrist at the carpal tunnel release surgery site.  ROM: Wrist flexion: 58 degrees with pain  Wrist extension: 65 degrees   FINGER ROM: Full composite fist.  OTHER: Neurotension of the median nerve present  See Exercise, Manual, and Modality Logs for complete treatment.       Assessment/Plan  Status post right carpal tunnel release.  He continues to have symptoms postsurgery.  Median nerve symptoms are easily irritated with gripping and vibration, he feels the symptoms into the long finger.  This is not unheard of or uncommon after prolonged compression of which he had several years.  Progress will be slow.  Progress per Plan of Care and Progress strengthening /stabilization /functional activity           Timed:         Manual Therapy:    14     mins  99450;     Therapeutic Exercise:    15     mins  93929;     Neuromuscular Rodrigo:        mins  62450;    Therapeutic Activity:      11     mins  13546;     Gait Training:           mins  96412;     Ultrasound:     13     mins  00757;    Ionto                                   mins   73953  Self Care                            mins   92053  Canalith Repos         mins 71780      Un-Timed:  Electrical Stimulation:    20     mins  33948 ( );  Dry Needling          mins self-pay  Traction          mins 27137      Timed Treatment:   53   mins   Total Treatment:     73   mins    Gurmeet Jimenez PT, T  [place] License: [id number]

## 2024-10-25 NOTE — PROGRESS NOTES
Physical Therapy Daily Treatment Note  [facility] [place] [facility] [place]  [address] [facility] [facility] [address]  [facility] [address]      Patient: Kvng Li   : 1950  Diagnosis/ICD-10 Code:  Carpal tunnel syndrome of right wrist [G56.01]  Referring practitioner: BLADIMIR Whitten  Date of Initial Visit: Type: THERAPY  Noted: 10/10/2024  Today's Date: 10/23/2024  Patient seen for 4 sessions         Visit Diagnoses:    ICD-10-CM ICD-9-CM   1. Carpal tunnel syndrome of right wrist  G56.01 354.0   2. Right wrist pain  M25.531 719.43   3. Orthopedic aftercare  Z47.89 V54.9       Subjective   Kvng Li reports: Long finger is still painful and very stiff to move, some pain at the surgical site.  This most of the numbness feeling that it is bothering the most.    Objective   OBSERVATION: Surgical wound closed.  Scar slightly elevated.  PALPATION: Carpal tunnel area tender with hypomobile scar.  ROM: Wrist flexion: 50 degrees with pain  Wrist extension: 65 degrees   FINGER ROM: Full composite fist.  OTHER: Neurotension of the median nerve present    See Exercise, Manual, and Modality Logs for complete treatment.       Assessment/Plan  Status post right carpal tunnel release.  Recovery is going slow for his nerve recovery.  Surgical scar area looks good.  This possible nerve damage that could be very slow to heal or even permanent.  Warrants continuation of rehab.  Progress per Plan of Care and Progress strengthening /stabilization /functional activity           Timed:         Manual Therapy:    15     mins  62504;     Therapeutic Exercise:    15     mins  78292;     Neuromuscular Rodrigo:        mins  73719;    Therapeutic Activity:    10      mins  71555;     Gait Training:           mins  35273;     Ultrasound:     13     mins  00843;    Ionto                                   mins   53621  Self Care                            mins   08388  Canalith Repos         mins 99540      Un-Timed:  Electrical  Stimulation:    20     mins  98299 ( );  Dry Needling          mins self-pay  Traction          mins 99378      Timed Treatment:   53   mins   Total Treatment:     73   mins    Gurmeet Jimenez PT, CHT  [place] License: [id number]

## 2024-10-28 ENCOUNTER — TREATMENT (OUTPATIENT)
Dept: PHYSICAL THERAPY | Facility: CLINIC | Age: 74
End: 2024-10-28
Payer: MEDICARE

## 2024-10-28 DIAGNOSIS — G56.01 CARPAL TUNNEL SYNDROME OF RIGHT WRIST: Primary | ICD-10-CM

## 2024-10-28 DIAGNOSIS — Z47.89 ORTHOPEDIC AFTERCARE: ICD-10-CM

## 2024-10-28 DIAGNOSIS — M25.531 RIGHT WRIST PAIN: ICD-10-CM

## 2024-10-28 PROCEDURE — 97035 APP MDLTY 1+ULTRASOUND EA 15: CPT | Performed by: PHYSICAL THERAPIST

## 2024-10-28 PROCEDURE — G0283 ELEC STIM OTHER THAN WOUND: HCPCS | Performed by: PHYSICAL THERAPIST

## 2024-10-28 PROCEDURE — 97140 MANUAL THERAPY 1/> REGIONS: CPT | Performed by: PHYSICAL THERAPIST

## 2024-10-28 PROCEDURE — 97110 THERAPEUTIC EXERCISES: CPT | Performed by: PHYSICAL THERAPIST

## 2024-10-28 NOTE — PROGRESS NOTES
Physical Therapy Daily Treatment Note  [facility] [place]  [address] [facility], [facility] [address]  [facility] [address]      Patient: vKng Li   : 1950  Diagnosis/ICD-10 Code:  Carpal tunnel syndrome of right wrist [G56.01]  Referring practitioner: BLADIMIR Whitten  Date of Initial Visit: Type: THERAPY  Noted: 10/10/2024  Today's Date: 10/28/2024  Patient seen for 6 sessions         Visit Diagnoses:    ICD-10-CM ICD-9-CM   1. Carpal tunnel syndrome of right wrist  G56.01 354.0   2. Right wrist pain  M25.531 719.43   3. Orthopedic aftercare  Z47.89 V54.9       Subjective   Kvng Li reports: Seems to transitioning to more numbness than shooting pain now.  Still cannot make full fist.    Objective   OBSERVATION: Unable to make full composite fist limited by IP joint flexion.  PALPATION: Mild to moderate tenderness at carpal tunnel release scar area.  ROM: Tip to palm 1.5 cm with ERP  STRENGTH:  strength limited by limited range of motion of IP joints.  OTHER: Neural tension present with the median nerve.    See Exercise, Manual, and Modality Logs for complete treatment.       Assessment/Plan  Status post right carpal tunnel release with continued median nerve symptoms into the long finger.  He does have IP joint stiffness and possible degenerative joint disease that has been exacerbated by edema related to surgery.  Progress per Plan of Care and Progress strengthening /stabilization /functional activity           Timed:         Manual Therapy:    20     mins  99910;     Therapeutic Exercise:    8     mins  10460;     Neuromuscular Rodrigo:        mins  95597;    Therapeutic Activity:          mins  72370;     Gait Training:           mins  73361;     Ultrasound:     13     mins  50781;    Ionto                                   mins   92777  Self Care                            mins   36456  Canalith Repos         mins 40540      Un-Timed:  Electrical Stimulation:    20     mins  17126 (  );  Dry Needling          mins self-pay  Traction          mins 08752      Timed Treatment:   41   mins   Total Treatment:     61   mins    Gurmeet Jimenez PT, CHT  [place] License: [id number]

## 2024-10-30 ENCOUNTER — TREATMENT (OUTPATIENT)
Dept: PHYSICAL THERAPY | Facility: CLINIC | Age: 74
End: 2024-10-30
Payer: MEDICARE

## 2024-10-30 DIAGNOSIS — Z47.89 ORTHOPEDIC AFTERCARE: ICD-10-CM

## 2024-10-30 DIAGNOSIS — M25.531 RIGHT WRIST PAIN: ICD-10-CM

## 2024-10-30 DIAGNOSIS — G56.01 CARPAL TUNNEL SYNDROME OF RIGHT WRIST: Primary | ICD-10-CM

## 2024-10-31 NOTE — PROGRESS NOTES
Physical Therapy Daily Treatment Note  [facility] [facility] [place]  [address] [facility], [address]  [facility] [address]      Patient: Kvng Li   : 1950  Diagnosis/ICD-10 Code:  Carpal tunnel syndrome of right wrist [G56.01]  Referring practitioner: BLADIMIR Whitten  Date of Initial Visit: Type: THERAPY  Noted: 10/10/2024  Today's Date: 10/30/2024  Patient seen for 7 sessions         Visit Diagnoses:    ICD-10-CM ICD-9-CM   1. Carpal tunnel syndrome of right wrist  G56.01 354.0   2. Right wrist pain  M25.531 719.43   3. Orthopedic aftercare  Z47.89 V54.9       Subjective   Kvng Li reports: finger stiffness is his biggest issue.    Objective   SEE LETTER TO MD MEDIAL FILE  See Exercise, Manual, and Modality Logs for complete treatment.       Assessment/Plan  SEE LETTER TO MD IN MEDIA FILE  Progress per Plan of Care and Progress strengthening /stabilization /functional activity           Timed:         Manual Therapy:    23     mins  84006;     Therapeutic Exercise:    12     mins  86951;     Neuromuscular Rodrigo:        mins  26039;    Therapeutic Activity:     12     mins  52178;     Gait Training:           mins  79477;     Ultrasound:     7     mins  27745;    Ionto                                   mins   10152  Self Care                            mins   67672  Canalith Repos         mins 35640      Un-Timed:  Electrical Stimulation:         mins  39428 ( );  Dry Needling          mins self-pay  Traction          mins 18792      Timed Treatment:   54   mins   Total Treatment:     54   mins    Gurmeet Jimenez PT, T  [place] License: [id number]

## 2024-11-06 ENCOUNTER — TREATMENT (OUTPATIENT)
Dept: PHYSICAL THERAPY | Facility: CLINIC | Age: 74
End: 2024-11-06
Payer: MEDICARE

## 2024-11-06 ENCOUNTER — TRANSCRIBE ORDERS (OUTPATIENT)
Dept: PHYSICAL THERAPY | Facility: CLINIC | Age: 74
End: 2024-11-06
Payer: MEDICARE

## 2024-11-06 DIAGNOSIS — M25.531 RIGHT WRIST PAIN: ICD-10-CM

## 2024-11-06 DIAGNOSIS — Z47.89 ORTHOPEDIC AFTERCARE: ICD-10-CM

## 2024-11-06 DIAGNOSIS — G56.01 CARPAL TUNNEL SYNDROME OF RIGHT WRIST: Primary | ICD-10-CM

## 2024-11-06 NOTE — PROGRESS NOTES
Physical Therapy Daily Treatment Note  [facility] [place]  [address], [address]  [place] [address]       Patient: Kvng Li   : 1950  Diagnosis/ICD-10 Code:  Carpal tunnel syndrome of right wrist [G56.01]  Referring practitioner: BLADIMIR Whitten  Date of Initial Visit: Type: THERAPY  Noted: 10/10/2024  Today's Date: 2024  Patient seen for 8 sessions         Visit Diagnoses:    ICD-10-CM ICD-9-CM   1. Carpal tunnel syndrome of right wrist  G56.01 354.0   2. Right wrist pain  M25.531 719.43   3. Orthopedic aftercare  Z47.89 V54.9       Subjective   Kvng Li reports: his fingers felt better after last session. He did a lot of manual work yesterday and feels like he flared up his hand some. Saw PA last week and said he is happy with Kvng's progress.    Objective   PALPATION: adhesions present though less present compared to last session  AROM: 75% of full composite fist, index finger 1.5 cm from palm    See Exercise, Manual, and Modality Logs for complete treatment.       Assessment/Plan  Patient demonstrates improving scar mobility following ultrasound and manual therapy and today's session.  Finger range of motion is improving slowly though still presents with significant stiffness and discomfort.    Progress per Plan of Care and Progress strengthening /stabilization /functional activity           Timed:         Manual Therapy:    20     mins  09026;     Therapeutic Exercise:    12     mins  58301;     Neuromuscular Rodrigo:        mins  04892;    Therapeutic Activity:     10     mins  88622;     Gait Training:           mins  14479;     Ultrasound:     12     mins  78048;    Ionto                                   mins   46333  Self Care                            mins   91260  Canalith Repos         mins 67135      Un-Timed:  Electrical Stimulation:         mins  69294 ( );  Dry Needling          mins self-pay  Traction          mins 53892      Timed Treatment:   54    mins   Total Treatment:     54   mins      Silvana Booker, PT  Physical Therapist

## 2024-11-08 ENCOUNTER — TREATMENT (OUTPATIENT)
Dept: PHYSICAL THERAPY | Facility: CLINIC | Age: 74
End: 2024-11-08
Payer: MEDICARE

## 2024-11-08 DIAGNOSIS — Z47.89 ORTHOPEDIC AFTERCARE: ICD-10-CM

## 2024-11-08 DIAGNOSIS — M25.531 RIGHT WRIST PAIN: ICD-10-CM

## 2024-11-08 DIAGNOSIS — G56.01 CARPAL TUNNEL SYNDROME OF RIGHT WRIST: Primary | ICD-10-CM

## 2024-11-08 NOTE — PROGRESS NOTES
Physical Therapy Daily Treatment Note  [facility] [place]  [address], [address]  [place] [address]       Patient: Kvng Li   : 1950  Diagnosis/ICD-10 Code:  Carpal tunnel syndrome of right wrist [G56.01]  Referring practitioner: BLADIMIR Whitten  Date of Initial Visit: Type: THERAPY  Noted: 10/10/2024  Today's Date: 2024  Patient seen for 9 sessions         Visit Diagnoses:    ICD-10-CM ICD-9-CM   1. Carpal tunnel syndrome of right wrist  G56.01 354.0   2. Right wrist pain  M25.531 719.43   3. Orthopedic aftercare  Z47.89 V54.9       Subjective   Kvng Li reports: That he was able to button his shirt with his right hand for the first time    Objective   Decreased presence of adhesions within the right volar wrist scar  3 cm index tip to palm, 0 cm at end of session    See Exercise, Manual, and Modality Logs for complete treatment.       Assessment/Plan  Patient responded well to manual treatment today and tolerated extensive index and long finger joint mobilization, he was educated that he may be feeling sore following today's session.  Scar presents with less adhesions and continue to improve following ultrasound and manual therapy.  Patient was able to make a full composite fist at the end of the session.  Progress per Plan of Care and Progress strengthening /stabilization /functional activity           Timed:         Manual Therapy:    30     mins  82807;     Therapeutic Exercise:         mins  94304;     Neuromuscular Rodrigo:        mins  09896;    Therapeutic Activity:          mins  74778;     Gait Training:           mins  55871;     Ultrasound:     12     mins  66899;    Ionto                                   mins   03650  Self Care                            mins   17603  Canalith Repos         mins 29567      Un-Timed:  Electrical Stimulation:         mins  21599 ( );  Dry Needling          mins self-pay  Traction          mins 67290      Timed Treatment:    42   mins   Total Treatment:     42   mins      Silvana Booker, PT  Physical Therapist

## 2024-11-11 ENCOUNTER — TREATMENT (OUTPATIENT)
Dept: PHYSICAL THERAPY | Facility: CLINIC | Age: 74
End: 2024-11-11
Payer: MEDICARE

## 2024-11-11 DIAGNOSIS — Z47.89 ORTHOPEDIC AFTERCARE: ICD-10-CM

## 2024-11-11 DIAGNOSIS — G56.01 CARPAL TUNNEL SYNDROME OF RIGHT WRIST: Primary | ICD-10-CM

## 2024-11-11 DIAGNOSIS — M25.531 RIGHT WRIST PAIN: ICD-10-CM

## 2024-11-11 NOTE — PROGRESS NOTES
Physical Therapy Daily Treatment Note  [facility] [place]  [address], [facility] [address]  [place] [address]       Patient: Kvng Li   : 1950  Diagnosis/ICD-10 Code:  Carpal tunnel syndrome of right wrist [G56.01]  Referring practitioner: BLADIMIR Whitten  Date of Initial Visit: Type: THERAPY  Noted: 10/10/2024  Today's Date: 2024  Patient seen for 10 sessions         Visit Diagnoses:    ICD-10-CM ICD-9-CM   1. Carpal tunnel syndrome of right wrist  G56.01 354.0   2. Right wrist pain  M25.531 719.43   3. Orthopedic aftercare  Z47.89 V54.9       Subjective   Kvng Li reports: his fingers are more sore after doing work over the weekend. He reports he feels like he's 30% better since when he began therapy.    Objective   90% full composite fist    See Exercise, Manual, and Modality Logs for complete treatment.       Assessment/Plan  Patient continues to demonstrate improvements with range of motion and scar mobility.  He was educated on tissue healing and demonstrated good understanding.  He continues to require skilled physical therapy to address his impairments.  Progress per Plan of Care and Progress strengthening /stabilization /functional activity           Timed:         Manual Therapy:    20     mins  09696;     Therapeutic Exercise:    12     mins  27553;     Neuromuscular Rodrigo:        mins  53547;    Therapeutic Activity:     10     mins  52268;     Gait Training:           mins  67634;     Ultrasound:    12      mins  26266;    Ionto                                   mins   30130  Self Care                            mins   96915  Canalith Repos         mins 40711      Un-Timed:  Electrical Stimulation:         mins  73241 ( );  Dry Needling          mins self-pay  Traction          mins 20977      Timed Treatment:   54   mins   Total Treatment:     54   mins      Silvana Booker, ARELIS  Physical Therapist

## 2024-11-15 ENCOUNTER — TREATMENT (OUTPATIENT)
Dept: PHYSICAL THERAPY | Facility: CLINIC | Age: 74
End: 2024-11-15
Payer: MEDICARE

## 2024-11-15 DIAGNOSIS — G56.01 CARPAL TUNNEL SYNDROME OF RIGHT WRIST: Primary | ICD-10-CM

## 2024-11-15 DIAGNOSIS — Z47.89 ORTHOPEDIC AFTERCARE: ICD-10-CM

## 2024-11-15 DIAGNOSIS — M25.531 RIGHT WRIST PAIN: ICD-10-CM

## 2024-11-15 PROCEDURE — 97110 THERAPEUTIC EXERCISES: CPT

## 2024-11-15 PROCEDURE — 97530 THERAPEUTIC ACTIVITIES: CPT

## 2024-11-15 PROCEDURE — 97035 APP MDLTY 1+ULTRASOUND EA 15: CPT

## 2024-11-15 NOTE — PROGRESS NOTES
Physical Therapy Daily Treatment Note  [facility] [place] [place] [facility] [place]  [address], [facility] [address]  [place] [address]       Patient: Kvng Li   : 1950  Diagnosis/ICD-10 Code:  Carpal tunnel syndrome of right wrist [G56.01]  Referring practitioner: BLADIMIR Whitten  Date of Initial Visit: Type: THERAPY  Noted: 10/10/2024  Today's Date: 11/15/2024  Patient seen for 11 sessions         Visit Diagnoses:    ICD-10-CM ICD-9-CM   1. Carpal tunnel syndrome of right wrist  G56.01 354.0   2. Right wrist pain  M25.531 719.43   3. Orthopedic aftercare  Z47.89 V54.9       Subjective   Kvng Li reports: He feels like it has been easier to move his hand though he still feels like his fingers are stiff.    Objective   85 to 90% of full composite fist  Decreased tenderness to palpation over right volar wrist scar    See Exercise, Manual, and Modality Logs for complete treatment.       Assessment/Plan  Patient demonstrates improved range of motion and improved tenderness.  He continues to tolerate progression of functional activities well.  Will look at discharging at next session.  Progress per Plan of Care and Progress strengthening /stabilization /functional activity           Timed:         Manual Therapy:         mins  03123;     Therapeutic Exercise:    15     mins  44559;     Neuromuscular Rodrigo:        mins  73373;    Therapeutic Activity:     30     mins  20731;     Gait Training:           mins  40204;     Ultrasound:     10     mins  09545;    Ionto                                   mins   17144  Self Care                            mins   32836  Canalith Repos         mins 38909      Un-Timed:  Electrical Stimulation:         mins  86183 ( );  Dry Needling          mins self-pay  Traction          mins 17009      Timed Treatment:   55   mins   Total Treatment:     55   mins      Silvana Booker PT  Physical Therapist

## 2024-11-18 ENCOUNTER — TREATMENT (OUTPATIENT)
Dept: PHYSICAL THERAPY | Facility: CLINIC | Age: 74
End: 2024-11-18
Payer: MEDICARE

## 2024-11-18 DIAGNOSIS — M25.531 RIGHT WRIST PAIN: ICD-10-CM

## 2024-11-18 DIAGNOSIS — G56.01 CARPAL TUNNEL SYNDROME OF RIGHT WRIST: Primary | ICD-10-CM

## 2024-11-18 DIAGNOSIS — Z47.89 ORTHOPEDIC AFTERCARE: ICD-10-CM

## 2024-11-18 NOTE — PROGRESS NOTES
Physical Therapy Daily Treatment Note  [facility] [place]  [address], [address]  [place] [address]       Patient: Kvng Li   : 1950  Diagnosis/ICD-10 Code:  Carpal tunnel syndrome of right wrist [G56.01]  Referring practitioner: BLADIMIR Whitten  Date of Initial Visit: Type: THERAPY  Noted: 10/10/2024  Today's Date: 2024  Patient seen for 12 sessions         Visit Diagnoses:    ICD-10-CM ICD-9-CM   1. Carpal tunnel syndrome of right wrist  G56.01 354.0   2. Right wrist pain  M25.531 719.43   3. Orthopedic aftercare  Z47.89 V54.9       Subjective   Kvng Li reports: That he has been able to do more and feels like his symptoms have been getting better especially in the fingers.    Objective   100% composite fist    See Exercise, Manual, and Modality Logs for complete treatment.       Assessment/Plan  Patient tolerated today's session well with significant improvements of stiffness especially in his fingers.  He tolerated progression of strengthening activities well.  He is able to do all functional activities with tolerable increase of numbness and tingling.  He was educated to return to his activities and to reach out if any significant changes occur in his function.  Other return if function changes           Timed:         Manual Therapy:    13     mins  54451;     Therapeutic Exercise:    15     mins  16295;     Neuromuscular Rodrigo:        mins  05988;    Therapeutic Activity:     15     mins  69847;     Gait Training:           mins  87578;     Ultrasound:     12     mins  48934;    Ionto                                   mins   15570  Self Care                            mins   74482  Canalith Repos         mins 80187      Un-Timed:  Electrical Stimulation:         mins  47518 ( );  Dry Needling          mins self-pay  Traction          mins 50983      Timed Treatment:   55   mins   Total Treatment:     55   mins      Silvana Booker, ARELIS  Physical Therapist

## 2025-02-10 DIAGNOSIS — E11.9 TYPE 2 DIABETES MELLITUS WITHOUT COMPLICATION, WITH LONG-TERM CURRENT USE OF INSULIN: ICD-10-CM

## 2025-02-10 DIAGNOSIS — Z79.4 TYPE 2 DIABETES MELLITUS WITHOUT COMPLICATION, WITH LONG-TERM CURRENT USE OF INSULIN: ICD-10-CM

## 2025-02-10 RX ORDER — EMPAGLIFLOZIN 25 MG/1
25 TABLET, FILM COATED ORAL DAILY
Qty: 30 TABLET | OUTPATIENT
Start: 2025-02-10

## 2025-05-25 DIAGNOSIS — I10 PRIMARY HYPERTENSION: ICD-10-CM

## 2025-05-27 RX ORDER — AMLODIPINE BESYLATE 10 MG/1
10 TABLET ORAL DAILY
Qty: 90 TABLET | Refills: 3 | OUTPATIENT
Start: 2025-05-27